# Patient Record
Sex: FEMALE | Race: OTHER | NOT HISPANIC OR LATINO | URBAN - METROPOLITAN AREA
[De-identification: names, ages, dates, MRNs, and addresses within clinical notes are randomized per-mention and may not be internally consistent; named-entity substitution may affect disease eponyms.]

---

## 2020-04-08 ENCOUNTER — EMERGENCY (EMERGENCY)
Facility: HOSPITAL | Age: 20
LOS: 1 days | End: 2020-04-08
Admitting: EMERGENCY MEDICINE
Payer: MEDICAID

## 2020-04-08 PROCEDURE — 74177 CT ABD & PELVIS W/CONTRAST: CPT | Mod: 26

## 2020-04-08 PROCEDURE — 99285 EMERGENCY DEPT VISIT HI MDM: CPT

## 2020-07-16 ENCOUNTER — APPOINTMENT (OUTPATIENT)
Dept: OBGYN | Facility: CLINIC | Age: 20
End: 2020-07-16
Payer: MEDICAID

## 2020-07-16 VITALS
WEIGHT: 135.31 LBS | BODY MASS INDEX: 22.55 KG/M2 | HEIGHT: 65 IN | DIASTOLIC BLOOD PRESSURE: 25 MMHG | SYSTOLIC BLOOD PRESSURE: 110 MMHG

## 2020-07-16 DIAGNOSIS — Z86.39 PERSONAL HISTORY OF OTHER ENDOCRINE, NUTRITIONAL AND METABOLIC DISEASE: ICD-10-CM

## 2020-07-16 DIAGNOSIS — Z87.442 PERSONAL HISTORY OF URINARY CALCULI: ICD-10-CM

## 2020-07-16 DIAGNOSIS — F50.00 ANOREXIA NERVOSA, UNSPECIFIED: ICD-10-CM

## 2020-07-16 DIAGNOSIS — Z87.42 PERSONAL HISTORY OF OTHER DISEASES OF THE FEMALE GENITAL TRACT: ICD-10-CM

## 2020-07-16 DIAGNOSIS — Z01.419 ENCOUNTER FOR GYNECOLOGICAL EXAMINATION (GENERAL) (ROUTINE) W/OUT ABNORMAL FINDINGS: ICD-10-CM

## 2020-07-16 DIAGNOSIS — Z80.49 FAMILY HISTORY OF MALIGNANT NEOPLASM OF OTHER GENITAL ORGANS: ICD-10-CM

## 2020-07-16 PROCEDURE — 99385 PREV VISIT NEW AGE 18-39: CPT

## 2020-07-16 NOTE — HISTORY OF PRESENT ILLNESS
[Menarche Age: ____] : age at menarche was [unfilled] [Sexually Active] : is sexually active [Contraception] : uses contraception [Spontaneous/Secondary] : is secondary/spontaneous [Previous Amenorrhea] : previous amenorrhea [PCOD] : polycystic ovarian syndrome [Condoms] : uses condoms [Regular Cycle Intervals] : periods have been irregular

## 2020-07-16 NOTE — PHYSICAL EXAM
[Awake] : awake [Alert] : alert [Oriented x3] : oriented to person, place, and time [Soft] : soft [Normal] : uterus [No Bleeding] : there was no active vaginal bleeding [Uterine Adnexae] : were not tender and not enlarged [Mass] : no breast mass [Acute Distress] : no acute distress [Nipple Discharge] : no nipple discharge [Axillary LAD] : no axillary lymphadenopathy [Tender] : non tender

## 2020-07-17 LAB
C TRACH RRNA SPEC QL NAA+PROBE: NOT DETECTED
N GONORRHOEA RRNA SPEC QL NAA+PROBE: NOT DETECTED
SOURCE AMPLIFICATION: NORMAL

## 2020-07-21 LAB — CYTOLOGY CVX/VAG DOC THIN PREP: NORMAL

## 2020-07-24 ENCOUNTER — APPOINTMENT (OUTPATIENT)
Dept: ULTRASOUND IMAGING | Facility: CLINIC | Age: 20
End: 2020-07-24

## 2020-08-06 LAB
DHEA-S SERPL-MCNC: 310 UG/DL
FSH SERPL-MCNC: 6.9 IU/L
GLUCOSE 1H P 100 G GLC PO SERPL-MCNC: 56 MG/DL
GLUCOSE 2H P 100 G GLC PO SERPL-MCNC: 71 MG/DL
GLUCOSE BS SERPL-MCNC: 87 MG/DL
HCG-TM SERPL-MCNC: <1 MIU/ML
LH SERPL-ACNC: 33.6 IU/L
PROLACTIN SERPL-MCNC: 24.8 NG/ML
TSH SERPL-ACNC: 1.29 UIU/ML

## 2020-08-07 ENCOUNTER — APPOINTMENT (OUTPATIENT)
Dept: OBGYN | Facility: CLINIC | Age: 20
End: 2020-08-07
Payer: MEDICAID

## 2020-08-07 VITALS
DIASTOLIC BLOOD PRESSURE: 60 MMHG | HEIGHT: 65 IN | WEIGHT: 135 LBS | SYSTOLIC BLOOD PRESSURE: 100 MMHG | BODY MASS INDEX: 22.49 KG/M2

## 2020-08-07 DIAGNOSIS — Z30.49 ENCOUNTER FOR SURVEILLANCE OF OTHER CONTRACEPTIVES: ICD-10-CM

## 2020-08-07 DIAGNOSIS — N91.2 AMENORRHEA, UNSPECIFIED: ICD-10-CM

## 2020-08-07 PROCEDURE — 99213 OFFICE O/P EST LOW 20 MIN: CPT

## 2020-08-07 NOTE — CHIEF COMPLAINT
[FreeTextEntry1] : The patient presents for results. She requests nuvaring.\par  [Follow Up] : follow up GYN visit

## 2020-08-11 LAB
17OHP SERPL-MCNC: 101 NG/DL
INSULIN P FAST SERPL-ACNC: 5.4 UU/ML
PROLACTIN SERPL-MCNC: 26.6 NG/ML
TESTOST BND SERPL-MCNC: 3.3 PG/ML
TESTOST SERPL-MCNC: 65 NG/DL

## 2020-08-12 ENCOUNTER — OUTPATIENT (OUTPATIENT)
Dept: OUTPATIENT SERVICES | Facility: HOSPITAL | Age: 20
LOS: 1 days | End: 2020-08-12

## 2020-08-12 ENCOUNTER — APPOINTMENT (OUTPATIENT)
Dept: ULTRASOUND IMAGING | Facility: CLINIC | Age: 20
End: 2020-08-12
Payer: MEDICAID

## 2020-08-12 DIAGNOSIS — N91.2 AMENORRHEA, UNSPECIFIED: ICD-10-CM

## 2020-08-12 PROCEDURE — 76830 TRANSVAGINAL US NON-OB: CPT | Mod: 26

## 2020-08-12 PROCEDURE — 76856 US EXAM PELVIC COMPLETE: CPT | Mod: 26

## 2020-09-09 ENCOUNTER — APPOINTMENT (OUTPATIENT)
Dept: ORTHOPEDIC SURGERY | Facility: CLINIC | Age: 20
End: 2020-09-09
Payer: MEDICAID

## 2020-09-09 VITALS — TEMPERATURE: 97.5 F

## 2020-09-09 VITALS
HEIGHT: 65 IN | SYSTOLIC BLOOD PRESSURE: 125 MMHG | WEIGHT: 135 LBS | DIASTOLIC BLOOD PRESSURE: 91 MMHG | BODY MASS INDEX: 22.49 KG/M2 | HEART RATE: 85 BPM

## 2020-09-09 PROCEDURE — 99204 OFFICE O/P NEW MOD 45 MIN: CPT

## 2020-09-09 PROCEDURE — 73110 X-RAY EXAM OF WRIST: CPT | Mod: LT

## 2020-12-10 ENCOUNTER — NON-APPOINTMENT (OUTPATIENT)
Age: 20
End: 2020-12-10

## 2020-12-23 PROBLEM — Z01.419 ENCOUNTER FOR GYNECOLOGICAL EXAMINATION: Status: RESOLVED | Noted: 2020-07-16 | Resolved: 2020-12-23

## 2021-03-04 ENCOUNTER — APPOINTMENT (OUTPATIENT)
Dept: OBGYN | Facility: CLINIC | Age: 21
End: 2021-03-04
Payer: MEDICAID

## 2021-03-04 VITALS — DIASTOLIC BLOOD PRESSURE: 82 MMHG | WEIGHT: 158.13 LBS | SYSTOLIC BLOOD PRESSURE: 108 MMHG

## 2021-03-04 DIAGNOSIS — Z87.09 PERSONAL HISTORY OF OTHER DISEASES OF THE RESPIRATORY SYSTEM: ICD-10-CM

## 2021-03-04 DIAGNOSIS — Z86.018 PERSONAL HISTORY OF OTHER BENIGN NEOPLASM: ICD-10-CM

## 2021-03-04 PROCEDURE — 99072 ADDL SUPL MATRL&STAF TM PHE: CPT

## 2021-03-04 PROCEDURE — 99213 OFFICE O/P EST LOW 20 MIN: CPT

## 2021-03-04 RX ORDER — DEXTROAMPHETAMINE SACCHARATE, AMPHETAMINE ASPARTATE, DEXTROAMPHETAMINE SULFATE, AND AMPHETAMINE SULFATE 5; 5; 5; 5 MG/1; MG/1; MG/1; MG/1
20 TABLET ORAL
Refills: 0 | Status: DISCONTINUED | COMMUNITY
End: 2021-03-04

## 2021-03-04 RX ORDER — BLOOD-GLUCOSE METER
W/DEVICE EACH MISCELLANEOUS
Qty: 1 | Refills: 0 | Status: ACTIVE | COMMUNITY
Start: 2020-11-30

## 2021-03-04 RX ORDER — MEDROXYPROGESTERONE ACETATE 10 MG/1
10 TABLET ORAL
Qty: 10 | Refills: 0 | Status: DISCONTINUED | COMMUNITY
Start: 2020-08-07 | End: 2021-03-04

## 2021-03-04 RX ORDER — BLOOD SUGAR DIAGNOSTIC
STRIP MISCELLANEOUS
Qty: 200 | Refills: 0 | Status: ACTIVE | COMMUNITY
Start: 2020-11-30

## 2021-03-04 RX ORDER — GLUCAGON 1 MG
1 KIT INJECTION
Qty: 2 | Refills: 0 | Status: ACTIVE | COMMUNITY
Start: 2020-11-30

## 2021-03-04 RX ORDER — LANCETS 33 GAUGE
EACH MISCELLANEOUS
Qty: 200 | Refills: 0 | Status: ACTIVE | COMMUNITY
Start: 2020-11-30

## 2021-03-04 NOTE — HISTORY OF PRESENT ILLNESS
[Intravaginal Ring] : uses the intravaginal ring [Y] : Patient is sexually active [N] : Patient denies prior pregnancies [Menarche Age: ____] : age at menarche was [unfilled] [PapSmeardate] : 07/2020 [PGHxTotal] : 0 [PGHxFullTerm] : 0 [PGHxPremature] : 0 [PGHxAbortions] : 0 [[facility]] : 0 [PGHxABInduced] : 0 [PGHxABSpont] : 0 [PGHxEctopic] : 0 [PGHxMultBirths] : 0

## 2021-03-04 NOTE — REASON FOR VISIT
[Follow-Up] : a follow-up evaluation of [FreeTextEntry2] : contraception. The patient presents for a refill of nuvaring.

## 2021-07-20 ENCOUNTER — APPOINTMENT (OUTPATIENT)
Dept: OBGYN | Facility: CLINIC | Age: 21
End: 2021-07-20
Payer: MEDICAID

## 2021-07-20 VITALS
BODY MASS INDEX: 26.66 KG/M2 | WEIGHT: 160 LBS | DIASTOLIC BLOOD PRESSURE: 70 MMHG | HEIGHT: 65 IN | SYSTOLIC BLOOD PRESSURE: 106 MMHG

## 2021-07-20 DIAGNOSIS — Z01.419 ENCOUNTER FOR GYNECOLOGICAL EXAMINATION (GENERAL) (ROUTINE) W/OUT ABNORMAL FINDINGS: ICD-10-CM

## 2021-07-20 DIAGNOSIS — Z86.39 PERSONAL HISTORY OF OTHER ENDOCRINE, NUTRITIONAL AND METABOLIC DISEASE: ICD-10-CM

## 2021-07-20 DIAGNOSIS — Z30.49 ENCOUNTER FOR SURVEILLANCE OF OTHER CONTRACEPTIVES: ICD-10-CM

## 2021-07-20 LAB
HCG UR QL: NEGATIVE
QUALITY CONTROL: YES

## 2021-07-20 PROCEDURE — 99072 ADDL SUPL MATRL&STAF TM PHE: CPT

## 2021-07-20 PROCEDURE — 81025 URINE PREGNANCY TEST: CPT

## 2021-07-20 PROCEDURE — 99395 PREV VISIT EST AGE 18-39: CPT

## 2021-07-20 RX ORDER — ETONOGESTREL AND ETHINYL ESTRADIOL 11.7; 2.7 MG/1; MG/1
0.12-0.015 INSERT, EXTENDED RELEASE VAGINAL
Qty: 3 | Refills: 0 | Status: DISCONTINUED | COMMUNITY
Start: 2020-08-07 | End: 2021-07-20

## 2021-07-20 NOTE — HISTORY OF PRESENT ILLNESS
[Intravaginal Ring] : uses the intravaginal ring [Y] : Patient is sexually active [Menarche Age: ____] : age at menarche was [unfilled] [PGHxTotal] : 0 [PGHxFullTerm] : 0 [PGHxPremature] : 0 [PGHxAbortions] : 0 [[facility]] : 0 [PGHxABInduced] : 0 [PGHxABSpont] : 0 [PGHxEctopic] : 0 [PGHxMultBirths] : 0

## 2021-07-21 LAB
C TRACH RRNA SPEC QL NAA+PROBE: NOT DETECTED
N GONORRHOEA RRNA SPEC QL NAA+PROBE: NOT DETECTED
SOURCE TP AMPLIFICATION: NORMAL

## 2021-07-26 LAB — CYTOLOGY CVX/VAG DOC THIN PREP: ABNORMAL

## 2021-11-13 ENCOUNTER — EMERGENCY (EMERGENCY)
Facility: HOSPITAL | Age: 21
LOS: 1 days | End: 2021-11-13
Admitting: EMERGENCY MEDICINE
Payer: MEDICAID

## 2021-11-13 PROCEDURE — 99053 MED SERV 10PM-8AM 24 HR FAC: CPT

## 2021-11-13 PROCEDURE — 99285 EMERGENCY DEPT VISIT HI MDM: CPT

## 2021-11-14 ENCOUNTER — EMERGENCY (EMERGENCY)
Facility: HOSPITAL | Age: 21
LOS: 1 days | End: 2021-11-14
Admitting: EMERGENCY MEDICINE
Payer: MEDICAID

## 2021-11-14 PROCEDURE — 74177 CT ABD & PELVIS W/CONTRAST: CPT | Mod: 26

## 2021-11-14 PROCEDURE — 93010 ELECTROCARDIOGRAM REPORT: CPT

## 2021-11-14 PROCEDURE — 99285 EMERGENCY DEPT VISIT HI MDM: CPT

## 2023-06-19 ENCOUNTER — APPOINTMENT (OUTPATIENT)
Dept: FAMILY MEDICINE | Facility: CLINIC | Age: 23
End: 2023-06-19

## 2023-07-25 ENCOUNTER — TRANSCRIPTION ENCOUNTER (OUTPATIENT)
Age: 23
End: 2023-07-25

## 2023-07-27 DIAGNOSIS — Z13.0 ENCOUNTER FOR SCREENING FOR DISEASES OF THE BLOOD AND BLOOD-FORMING ORGANS AND CERTAIN DISORDERS INVOLVING THE IMMUNE MECHANISM: ICD-10-CM

## 2023-07-27 DIAGNOSIS — Z13.220 ENCOUNTER FOR SCREENING FOR LIPOID DISORDERS: ICD-10-CM

## 2023-07-27 DIAGNOSIS — Z13.29 ENCOUNTER FOR SCREENING FOR OTHER SUSPECTED ENDOCRINE DISORDER: ICD-10-CM

## 2023-07-31 ENCOUNTER — APPOINTMENT (OUTPATIENT)
Dept: FAMILY MEDICINE | Facility: CLINIC | Age: 23
End: 2023-07-31
Payer: COMMERCIAL

## 2023-07-31 VITALS
HEART RATE: 96 BPM | SYSTOLIC BLOOD PRESSURE: 110 MMHG | OXYGEN SATURATION: 97 % | WEIGHT: 129 LBS | RESPIRATION RATE: 14 BRPM | TEMPERATURE: 98.2 F | BODY MASS INDEX: 21.49 KG/M2 | HEIGHT: 65 IN | DIASTOLIC BLOOD PRESSURE: 80 MMHG

## 2023-07-31 DIAGNOSIS — Z76.89 PERSONS ENCOUNTERING HEALTH SERVICES IN OTHER SPECIFIED CIRCUMSTANCES: ICD-10-CM

## 2023-07-31 DIAGNOSIS — M25.532 PAIN IN LEFT WRIST: ICD-10-CM

## 2023-07-31 PROCEDURE — 99204 OFFICE O/P NEW MOD 45 MIN: CPT

## 2023-07-31 RX ORDER — INSULIN GLARGINE 100 [IU]/ML
INJECTION, SOLUTION SUBCUTANEOUS
Refills: 0 | Status: DISCONTINUED | COMMUNITY
End: 2023-07-31

## 2023-07-31 RX ORDER — GLUCAGON 3 MG/1
3 POWDER NASAL
Qty: 2 | Refills: 0 | Status: ACTIVE | COMMUNITY
Start: 2023-03-21

## 2023-07-31 RX ORDER — ARIPIPRAZOLE 2 MG/1
2 TABLET ORAL
Qty: 21 | Refills: 0 | Status: DISCONTINUED | COMMUNITY
Start: 2020-10-20 | End: 2023-07-31

## 2023-07-31 RX ORDER — DEXTROAMPHETAMINE SACCHARATE, AMPHETAMINE ASPARTATE MONOHYDRATE, DEXTROAMPHETAMINE SULFATE AND AMPHETAMINE SULFATE 5; 5; 5; 5 MG/1; MG/1; MG/1; MG/1
20 CAPSULE, EXTENDED RELEASE ORAL
Qty: 30 | Refills: 0 | Status: DISCONTINUED | COMMUNITY
Start: 2020-09-17 | End: 2023-07-31

## 2023-07-31 RX ORDER — INSULIN GLULISINE 100 [IU]/ML
100 INJECTION, SOLUTION SUBCUTANEOUS
Qty: 15 | Refills: 0 | Status: COMPLETED | COMMUNITY
Start: 2023-05-24

## 2023-07-31 RX ORDER — ETONOGESTREL AND ETHINYL ESTRADIOL 11.7; 2.7 MG/1; MG/1
0.12-0.015 INSERT, EXTENDED RELEASE VAGINAL
Qty: 3 | Refills: 1 | Status: DISCONTINUED | COMMUNITY
Start: 2021-03-04 | End: 2023-07-31

## 2023-07-31 RX ORDER — OXYCODONE AND ACETAMINOPHEN 5; 325 MG/1; MG/1
5-325 TABLET ORAL
Qty: 10 | Refills: 0 | Status: COMPLETED | COMMUNITY
Start: 2023-07-23

## 2023-07-31 RX ORDER — ALBUTEROL SULFATE 90 UG/1
108 (90 BASE) INHALANT RESPIRATORY (INHALATION)
Qty: 18 | Refills: 0 | Status: COMPLETED | COMMUNITY
Start: 2020-11-30 | End: 2023-07-31

## 2023-07-31 RX ORDER — ETONOGESTREL AND ETHINYL ESTRADIOL 11.7; 2.7 MG/1; MG/1
0.12-0.015 INSERT, EXTENDED RELEASE VAGINAL
Qty: 3 | Refills: 1 | Status: DISCONTINUED | COMMUNITY
Start: 2021-07-20 | End: 2023-07-31

## 2023-07-31 RX ORDER — INSULIN LISPRO 100 U/ML
100 INJECTION, SOLUTION INTRAVENOUS; SUBCUTANEOUS
Qty: 30 | Refills: 0 | Status: ACTIVE | COMMUNITY
Start: 2023-02-10

## 2023-07-31 RX ORDER — LORAZEPAM 1 MG/1
1 TABLET ORAL
Qty: 30 | Refills: 0 | Status: COMPLETED | COMMUNITY
Start: 2020-10-29 | End: 2023-07-31

## 2023-07-31 RX ORDER — SERTRALINE HYDROCHLORIDE 100 MG/1
100 TABLET, FILM COATED ORAL
Qty: 14 | Refills: 0 | Status: COMPLETED | COMMUNITY
Start: 2020-11-30 | End: 2023-07-31

## 2023-07-31 RX ORDER — INSULIN GLARGINE 100 [IU]/ML
100 INJECTION, SOLUTION SUBCUTANEOUS
Qty: 15 | Refills: 0 | Status: ACTIVE | COMMUNITY
Start: 2023-02-15

## 2023-07-31 NOTE — HISTORY OF PRESENT ILLNESS
[FreeTextEntry8] : Patient is present. Patient is here to establish care. Patient a type 1 diabetics.   Born healthy at [facility]. At 5 dx w ADD ODD,  She had behavioral issues and hormonal issues , low blood sugar . she was hospitalized 20 x for low blood sugar.  Eventually she was diagnosed with a neuroendocrine tumor. She had a whipple procedure. She is on creon and insulin pump. She suffered severe malnutrition  lost 100 pounds .lost her hair, she is just building back her life, gaining back some weight and getting stronger . She took a year off to work . she is going to [employer] for [employer]. 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  She lives with her fiancee in [facility]. her mom has hx of allergies. her dad is not in the picture. she has an support dog for her type 3c diabetes. the dog is 8 yo her name is [email or web address].  she is famous for her service. she has the ability to get my glucagon and a drink out of the fridge and she carries my juice and pen for me. I am so thankful for her.  she needs renewal of adderall and creon.

## 2023-07-31 NOTE — PHYSICAL EXAM
[No Acute Distress] : no acute distress [Well Nourished] : well nourished [Well Developed] : well developed [Well-Appearing] : well-appearing [Normal Sclera/Conjunctiva] : normal sclera/conjunctiva [PERRL] : pupils equal round and reactive to light [EOMI] : extraocular movements intact [Normal Outer Ear/Nose] : the outer ears and nose were normal in appearance [Normal Oropharynx] : the oropharynx was normal [No JVD] : no jugular venous distention [No Lymphadenopathy] : no lymphadenopathy [Supple] : supple [Thyroid Normal, No Nodules] : the thyroid was normal and there were no nodules present [No Respiratory Distress] : no respiratory distress  [No Accessory Muscle Use] : no accessory muscle use [Clear to Auscultation] : lungs were clear to auscultation bilaterally [Normal Rate] : normal rate  [Regular Rhythm] : with a regular rhythm [Normal S1, S2] : normal S1 and S2 [No Murmur] : no murmur heard [No Carotid Bruits] : no carotid bruits [No Abdominal Bruit] : a ~M bruit was not heard ~T in the abdomen [No Varicosities] : no varicosities [Pedal Pulses Present] : the pedal pulses are present [No Edema] : there was no peripheral edema [No Palpable Aorta] : no palpable aorta [No Extremity Clubbing/Cyanosis] : no extremity clubbing/cyanosis [Non-distended] : non-distended [No Masses] : no abdominal mass palpated [No HSM] : no HSM [Normal Bowel Sounds] : normal bowel sounds [Normal Posterior Cervical Nodes] : no posterior cervical lymphadenopathy [Normal Anterior Cervical Nodes] : no anterior cervical lymphadenopathy [No CVA Tenderness] : no CVA  tenderness [No Spinal Tenderness] : no spinal tenderness [No Joint Swelling] : no joint swelling [Grossly Normal Strength/Tone] : grossly normal strength/tone [No Rash] : no rash [Coordination Grossly Intact] : coordination grossly intact [No Focal Deficits] : no focal deficits [Normal Gait] : normal gait [Deep Tendon Reflexes (DTR)] : deep tendon reflexes were 2+ and symmetric [Normal Affect] : the affect was normal [Normal Insight/Judgement] : insight and judgment were intact [de-identified] : multiple scars.  on abdomen

## 2023-07-31 NOTE — REVIEW OF SYSTEMS
[Negative] : Heme/Lymph [Recent Change In Weight] : ~T recent weight change [Constipation] : constipation [Diarrhea] : diarrhea

## 2023-07-31 NOTE — CURRENT MEDS
[Takes medication as prescribed] : takes [None] : Patient does not have any barriers to medication adherence [Yes] : Reviewed medication list for presence of high-risk medications. [Other____] : [unfilled] No

## 2023-08-01 ENCOUNTER — TRANSCRIPTION ENCOUNTER (OUTPATIENT)
Age: 23
End: 2023-08-01

## 2023-09-11 ENCOUNTER — APPOINTMENT (OUTPATIENT)
Dept: FAMILY MEDICINE | Facility: CLINIC | Age: 23
End: 2023-09-11
Payer: COMMERCIAL

## 2023-09-11 VITALS
OXYGEN SATURATION: 96 % | WEIGHT: 129 LBS | HEIGHT: 65 IN | RESPIRATION RATE: 14 BRPM | BODY MASS INDEX: 21.49 KG/M2 | TEMPERATURE: 98.5 F | SYSTOLIC BLOOD PRESSURE: 108 MMHG | HEART RATE: 86 BPM | DIASTOLIC BLOOD PRESSURE: 76 MMHG

## 2023-09-11 DIAGNOSIS — F32.A ANXIETY DISORDER, UNSPECIFIED: ICD-10-CM

## 2023-09-11 DIAGNOSIS — F90.9 ATTENTION-DEFICIT HYPERACTIVITY DISORDER, UNSPECIFIED TYPE: ICD-10-CM

## 2023-09-11 DIAGNOSIS — F41.9 ANXIETY DISORDER, UNSPECIFIED: ICD-10-CM

## 2023-09-11 PROCEDURE — 99214 OFFICE O/P EST MOD 30 MIN: CPT

## 2023-09-11 RX ORDER — INSULIN PMP CART,AUT,G6/7,CNTR
EACH SUBCUTANEOUS
Qty: 1 | Refills: 0 | Status: ACTIVE | COMMUNITY
Start: 2023-08-01

## 2023-09-11 RX ORDER — PEN NEEDLE, DIABETIC 29 G X1/2"
32G X 4 MM NEEDLE, DISPOSABLE MISCELLANEOUS
Qty: 200 | Refills: 0 | Status: ACTIVE | COMMUNITY
Start: 2023-02-16

## 2023-09-11 RX ORDER — INSULIN PMP CART,AUT,G6/7,CNTR
EACH SUBCUTANEOUS
Qty: 10 | Refills: 0 | Status: ACTIVE | COMMUNITY
Start: 2023-08-01

## 2023-09-11 RX ORDER — INSULIN LISPRO 100 [IU]/ML
100 INJECTION, SOLUTION INTRAVENOUS; SUBCUTANEOUS
Qty: 45 | Refills: 0 | Status: ACTIVE | COMMUNITY
Start: 2023-08-01

## 2023-10-11 ENCOUNTER — TRANSCRIPTION ENCOUNTER (OUTPATIENT)
Age: 23
End: 2023-10-11

## 2023-10-16 ENCOUNTER — TRANSCRIPTION ENCOUNTER (OUTPATIENT)
Age: 23
End: 2023-10-16

## 2023-10-30 ENCOUNTER — TRANSCRIPTION ENCOUNTER (OUTPATIENT)
Age: 23
End: 2023-10-30

## 2023-11-11 ENCOUNTER — NON-APPOINTMENT (OUTPATIENT)
Age: 23
End: 2023-11-11

## 2023-11-14 ENCOUNTER — APPOINTMENT (OUTPATIENT)
Dept: FAMILY MEDICINE | Facility: CLINIC | Age: 23
End: 2023-11-14
Payer: COMMERCIAL

## 2023-11-14 ENCOUNTER — APPOINTMENT (OUTPATIENT)
Dept: FAMILY MEDICINE | Facility: CLINIC | Age: 23
End: 2023-11-14

## 2023-11-14 VITALS
OXYGEN SATURATION: 98 % | RESPIRATION RATE: 14 BRPM | BODY MASS INDEX: 21.49 KG/M2 | SYSTOLIC BLOOD PRESSURE: 120 MMHG | WEIGHT: 129 LBS | HEIGHT: 65 IN | HEART RATE: 80 BPM | DIASTOLIC BLOOD PRESSURE: 78 MMHG | TEMPERATURE: 98.7 F

## 2023-11-14 DIAGNOSIS — D64.9 ANEMIA, UNSPECIFIED: ICD-10-CM

## 2023-11-14 DIAGNOSIS — Z00.00 ENCOUNTER FOR GENERAL ADULT MEDICAL EXAMINATION W/OUT ABNORMAL FINDINGS: ICD-10-CM

## 2023-11-14 PROCEDURE — 99214 OFFICE O/P EST MOD 30 MIN: CPT

## 2023-11-14 RX ORDER — DEXTROAMPHETAMINE SACCHARATE, AMPHETAMINE ASPARTATE, DEXTROAMPHETAMINE SULFATE AND AMPHETAMINE SULFATE 5; 5; 5; 5 MG/1; MG/1; MG/1; MG/1
20 TABLET ORAL TWICE DAILY
Qty: 60 | Refills: 0 | Status: ACTIVE | COMMUNITY
Start: 1900-01-01 | End: 1900-01-01

## 2023-11-17 DIAGNOSIS — N92.6 IRREGULAR MENSTRUATION, UNSPECIFIED: ICD-10-CM

## 2023-11-17 LAB
ALBUMIN SERPL ELPH-MCNC: 5.1 G/DL
ALP BLD-CCNC: 91 U/L
ALT SERPL-CCNC: 13 U/L
ANION GAP SERPL CALC-SCNC: 12 MMOL/L
AST SERPL-CCNC: 19 U/L
BASOPHILS # BLD AUTO: 0.02 K/UL
BASOPHILS NFR BLD AUTO: 0.3 %
BILIRUB SERPL-MCNC: 1 MG/DL
BUN SERPL-MCNC: 12 MG/DL
CALCIUM SERPL-MCNC: 9.7 MG/DL
CHLORIDE SERPL-SCNC: 95 MMOL/L
CO2 SERPL-SCNC: 25 MMOL/L
CREAT SERPL-MCNC: 0.54 MG/DL
EGFR: 132 ML/MIN/1.73M2
EOSINOPHIL # BLD AUTO: 0.03 K/UL
EOSINOPHIL NFR BLD AUTO: 0.5 %
FERRITIN SERPL-MCNC: 40 NG/ML
FOLATE SERPL-MCNC: 19.9 NG/ML
GLUCOSE SERPL-MCNC: 236 MG/DL
HCT VFR BLD CALC: 35.7 %
HGB BLD-MCNC: 11.7 G/DL
IMM GRANULOCYTES NFR BLD AUTO: 0.2 %
IRON SATN MFR SERPL: 18 %
IRON SERPL-MCNC: 76 UG/DL
LYMPHOCYTES # BLD AUTO: 1.57 K/UL
LYMPHOCYTES NFR BLD AUTO: 25.7 %
MAN DIFF?: NORMAL
MCHC RBC-ENTMCNC: 31.3 PG
MCHC RBC-ENTMCNC: 32.8 GM/DL
MCV RBC AUTO: 95.5 FL
MONOCYTES # BLD AUTO: 0.55 K/UL
MONOCYTES NFR BLD AUTO: 9 %
NEUTROPHILS # BLD AUTO: 3.94 K/UL
NEUTROPHILS NFR BLD AUTO: 64.3 %
PLATELET # BLD AUTO: 317 K/UL
POTASSIUM SERPL-SCNC: 4.1 MMOL/L
PROT SERPL-MCNC: 7.6 G/DL
RBC # BLD: 3.74 M/UL
RBC # FLD: 11.6 %
SODIUM SERPL-SCNC: 131 MMOL/L
TIBC SERPL-MCNC: 420 UG/DL
UIBC SERPL-MCNC: 343 UG/DL
VIT B12 SERPL-MCNC: 1063 PG/ML
WBC # FLD AUTO: 6.12 K/UL

## 2023-11-20 LAB — HCG SERPL-MCNC: ABNORMAL MIU/ML

## 2023-12-06 ENCOUNTER — EMERGENCY (EMERGENCY)
Facility: HOSPITAL | Age: 23
LOS: 1 days | Discharge: HOME | End: 2023-12-06
Attending: EMERGENCY MEDICINE | Admitting: EMERGENCY MEDICINE
Payer: COMMERCIAL

## 2023-12-06 VITALS
OXYGEN SATURATION: 98 % | DIASTOLIC BLOOD PRESSURE: 61 MMHG | HEART RATE: 86 BPM | SYSTOLIC BLOOD PRESSURE: 107 MMHG | RESPIRATION RATE: 18 BRPM

## 2023-12-06 VITALS
SYSTOLIC BLOOD PRESSURE: 114 MMHG | DIASTOLIC BLOOD PRESSURE: 72 MMHG | OXYGEN SATURATION: 98 % | RESPIRATION RATE: 15 BRPM | TEMPERATURE: 98 F | HEIGHT: 66 IN | HEART RATE: 104 BPM | WEIGHT: 130.07 LBS

## 2023-12-06 DIAGNOSIS — E10.9 TYPE 1 DIABETES MELLITUS WITHOUT COMPLICATIONS: ICD-10-CM

## 2023-12-06 LAB
ALBUMIN SERPL ELPH-MCNC: 4 G/DL — SIGNIFICANT CHANGE UP (ref 3.3–5)
ALP SERPL-CCNC: 56 U/L — SIGNIFICANT CHANGE UP (ref 30–120)
ALT FLD-CCNC: 31 U/L — SIGNIFICANT CHANGE UP (ref 10–60)
ANION GAP SERPL CALC-SCNC: 7 MMOL/L — SIGNIFICANT CHANGE UP (ref 5–17)
APTT BLD: 31.5 SEC — SIGNIFICANT CHANGE UP (ref 24.5–35.6)
AST SERPL-CCNC: 27 U/L — SIGNIFICANT CHANGE UP (ref 10–40)
BASOPHILS # BLD AUTO: 0.02 K/UL — SIGNIFICANT CHANGE UP (ref 0–0.2)
BASOPHILS NFR BLD AUTO: 0.2 % — SIGNIFICANT CHANGE UP (ref 0–2)
BILIRUB SERPL-MCNC: 0.4 MG/DL — SIGNIFICANT CHANGE UP (ref 0.2–1.2)
BUN SERPL-MCNC: 13 MG/DL — SIGNIFICANT CHANGE UP (ref 7–23)
CALCIUM SERPL-MCNC: 9.9 MG/DL — SIGNIFICANT CHANGE UP (ref 8.4–10.5)
CHLORIDE SERPL-SCNC: 95 MMOL/L — LOW (ref 96–108)
CO2 SERPL-SCNC: 28 MMOL/L — SIGNIFICANT CHANGE UP (ref 22–31)
CREAT SERPL-MCNC: 0.59 MG/DL — SIGNIFICANT CHANGE UP (ref 0.5–1.3)
EGFR: 129 ML/MIN/1.73M2 — SIGNIFICANT CHANGE UP
EOSINOPHIL # BLD AUTO: 0.09 K/UL — SIGNIFICANT CHANGE UP (ref 0–0.5)
EOSINOPHIL NFR BLD AUTO: 1 % — SIGNIFICANT CHANGE UP (ref 0–6)
GLUCOSE BLDC GLUCOMTR-MCNC: 144 MG/DL — HIGH (ref 70–99)
GLUCOSE BLDC GLUCOMTR-MCNC: 195 MG/DL — HIGH (ref 70–99)
GLUCOSE BLDC GLUCOMTR-MCNC: 254 MG/DL — HIGH (ref 70–99)
GLUCOSE BLDC GLUCOMTR-MCNC: 97 MG/DL — SIGNIFICANT CHANGE UP (ref 70–99)
GLUCOSE SERPL-MCNC: 39 MG/DL — CRITICAL LOW (ref 70–99)
HCG SERPL-ACNC: HIGH MIU/ML
HCT VFR BLD CALC: 34.3 % — LOW (ref 34.5–45)
HGB BLD-MCNC: 11.2 G/DL — LOW (ref 11.5–15.5)
IMM GRANULOCYTES NFR BLD AUTO: 0.2 % — SIGNIFICANT CHANGE UP (ref 0–0.9)
INR BLD: 1.02 RATIO — SIGNIFICANT CHANGE UP (ref 0.85–1.18)
LYMPHOCYTES # BLD AUTO: 2.61 K/UL — SIGNIFICANT CHANGE UP (ref 1–3.3)
LYMPHOCYTES # BLD AUTO: 29.1 % — SIGNIFICANT CHANGE UP (ref 13–44)
MCHC RBC-ENTMCNC: 31.2 PG — SIGNIFICANT CHANGE UP (ref 27–34)
MCHC RBC-ENTMCNC: 32.7 GM/DL — SIGNIFICANT CHANGE UP (ref 32–36)
MCV RBC AUTO: 95.5 FL — SIGNIFICANT CHANGE UP (ref 80–100)
MONOCYTES # BLD AUTO: 0.87 K/UL — SIGNIFICANT CHANGE UP (ref 0–0.9)
MONOCYTES NFR BLD AUTO: 9.7 % — SIGNIFICANT CHANGE UP (ref 2–14)
NEUTROPHILS # BLD AUTO: 5.37 K/UL — SIGNIFICANT CHANGE UP (ref 1.8–7.4)
NEUTROPHILS NFR BLD AUTO: 59.8 % — SIGNIFICANT CHANGE UP (ref 43–77)
NRBC # BLD: 0 /100 WBCS — SIGNIFICANT CHANGE UP (ref 0–0)
PLATELET # BLD AUTO: 311 K/UL — SIGNIFICANT CHANGE UP (ref 150–400)
POTASSIUM SERPL-MCNC: 3.3 MMOL/L — LOW (ref 3.5–5.3)
POTASSIUM SERPL-SCNC: 3.3 MMOL/L — LOW (ref 3.5–5.3)
PROT SERPL-MCNC: 7.6 G/DL — SIGNIFICANT CHANGE UP (ref 6–8.3)
PROTHROM AB SERPL-ACNC: 11.4 SEC — SIGNIFICANT CHANGE UP (ref 9.5–13)
RBC # BLD: 3.59 M/UL — LOW (ref 3.8–5.2)
RBC # FLD: 11.6 % — SIGNIFICANT CHANGE UP (ref 10.3–14.5)
SODIUM SERPL-SCNC: 130 MMOL/L — LOW (ref 135–145)
WBC # BLD: 8.98 K/UL — SIGNIFICANT CHANGE UP (ref 3.8–10.5)
WBC # FLD AUTO: 8.98 K/UL — SIGNIFICANT CHANGE UP (ref 3.8–10.5)

## 2023-12-06 PROCEDURE — 85025 COMPLETE CBC W/AUTO DIFF WBC: CPT

## 2023-12-06 PROCEDURE — 76817 TRANSVAGINAL US OBSTETRIC: CPT

## 2023-12-06 PROCEDURE — 96361 HYDRATE IV INFUSION ADD-ON: CPT

## 2023-12-06 PROCEDURE — 36415 COLL VENOUS BLD VENIPUNCTURE: CPT

## 2023-12-06 PROCEDURE — 99284 EMERGENCY DEPT VISIT MOD MDM: CPT | Mod: 25

## 2023-12-06 PROCEDURE — 99285 EMERGENCY DEPT VISIT HI MDM: CPT

## 2023-12-06 PROCEDURE — 82962 GLUCOSE BLOOD TEST: CPT

## 2023-12-06 PROCEDURE — 96375 TX/PRO/DX INJ NEW DRUG ADDON: CPT

## 2023-12-06 PROCEDURE — 96374 THER/PROPH/DIAG INJ IV PUSH: CPT

## 2023-12-06 PROCEDURE — 85610 PROTHROMBIN TIME: CPT

## 2023-12-06 PROCEDURE — 84702 CHORIONIC GONADOTROPIN TEST: CPT

## 2023-12-06 PROCEDURE — 85730 THROMBOPLASTIN TIME PARTIAL: CPT

## 2023-12-06 PROCEDURE — 76817 TRANSVAGINAL US OBSTETRIC: CPT | Mod: 26

## 2023-12-06 PROCEDURE — 96376 TX/PRO/DX INJ SAME DRUG ADON: CPT

## 2023-12-06 PROCEDURE — 80053 COMPREHEN METABOLIC PANEL: CPT

## 2023-12-06 RX ORDER — SODIUM CHLORIDE 9 MG/ML
1000 INJECTION, SOLUTION INTRAVENOUS
Refills: 0 | Status: DISCONTINUED | OUTPATIENT
Start: 2023-12-06 | End: 2023-12-10

## 2023-12-06 RX ORDER — ONDANSETRON 8 MG/1
1 TABLET, FILM COATED ORAL
Qty: 2 | Refills: 0
Start: 2023-12-06 | End: 2023-12-10

## 2023-12-06 RX ORDER — ONDANSETRON 8 MG/1
4 TABLET, FILM COATED ORAL ONCE
Refills: 0 | Status: COMPLETED | OUTPATIENT
Start: 2023-12-06 | End: 2023-12-06

## 2023-12-06 RX ORDER — DEXTROSE 50 % IN WATER 50 %
50 SYRINGE (ML) INTRAVENOUS ONCE
Refills: 0 | Status: COMPLETED | OUTPATIENT
Start: 2023-12-06 | End: 2023-12-06

## 2023-12-06 RX ADMIN — SODIUM CHLORIDE 50 MILLILITER(S): 9 INJECTION, SOLUTION INTRAVENOUS at 16:52

## 2023-12-06 RX ADMIN — SODIUM CHLORIDE 1000 MILLILITER(S): 9 INJECTION, SOLUTION INTRAVENOUS at 21:21

## 2023-12-06 RX ADMIN — Medication 50 MILLILITER(S): at 16:29

## 2023-12-06 RX ADMIN — ONDANSETRON 4 MILLIGRAM(S): 8 TABLET, FILM COATED ORAL at 16:54

## 2023-12-06 RX ADMIN — SODIUM CHLORIDE 50 MILLILITER(S): 9 INJECTION, SOLUTION INTRAVENOUS at 16:28

## 2023-12-06 RX ADMIN — ONDANSETRON 4 MILLIGRAM(S): 8 TABLET, FILM COATED ORAL at 19:40

## 2023-12-06 RX ADMIN — Medication 50 MILLILITER(S): at 16:52

## 2023-12-06 NOTE — ED ADULT NURSE REASSESSMENT NOTE - NS ED NURSE REASSESS COMMENT FT1
Diabetic DASH Daniel at bedside with pt for eval of pt
f/s 127, no new complaints or cosening of s/s, will continue to monitor pt and continue to recheck f/s q30 mins per MD orders
f/s 144, pt eating another meal, reports feeling better at this time, will continue to monitor pt
pt states she began feeling like her sugar was low, f/s 97 , gave pt meal & juice, will continue to monitor pt
Tests resulted, VSS, pt reevaluated by MD, D/C home with f/u instructions.

## 2023-12-06 NOTE — CONSULT NOTE ADULT - SUBJECTIVE AND OBJECTIVE BOX
Patient is a 23y old  Female who presents with a chief complaint of     Type 3c, s/p whipple total pancrectomy in 2020 r/t pancreatic tumor, unsure of current A1c. managed by [place] [facility], last seen 3 months ago. reports now 8 weeks pregnant. uses humalog in omnipod 5 with dexcom g6 CGM. reviewed 24 hour trend 50-80 w/ 2 spikes >250 prandial. pt reports N/V unable to keep food down. received 2 amps d50 + d5 @ 50ml/hr, will trial Zofran and give 15g carbs PO, treat until F/S >100mg/dL. pt has appt w/ high risk OB at [facility] this friday.  diabetes education provided- A1c measure and BG targets  fasting, <180 2 hours postmeal, s/s of hyperglycemia/hypoglycemia and management, glycemic control and preventing complications, consistent carb diet, balanced plate method, consistent meal planning. sick day management, provider f/u  Hx gravitas, N/V, type 3c    HPI:      PAST MEDICAL & SURGICAL HISTORY:    Allergies    penicillin (Hives)    Intolerances        MEDICATIONS  (STANDING):  dextrose 5%. 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 managed by [place] [facility], last seen 3 months ago. reports now 8 weeks pregnant. uses humalog in omnipod 5 with dexcom g6 CGM. reviewed 24 hour trend 50-80 w/ 2 spikes >250 prandial. pt reports N/V unable to keep food down. received 2 amps d50 + d5 @ 50ml/hr, will trial Zofran and give 15g carbs PO, treat until F/S >100mg/dL. pt has appt w/ high risk OB at [facility] this friday.  diabetes education provided- A1c measure and BG targets  fasting, <180 2 hours postmeal, s/s of hyperglycemia/hypoglycemia and management, glycemic control and preventing complications, consistent carb diet, balanced plate method, consistent meal planning. sick day management, provider f/u  Hx gravitas, N/V, type 3c    HPI:      PAST MEDICAL & SURGICAL HISTORY:    Allergies    penicillin (Hives)    Intolerances        MEDICATIONS  (STANDING):  dextrose 5%. 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 managed by [place] [facility], last seen 3 months ago. reports now 8 weeks pregnant. uses humalog in omnipod 5 with dexcom g6 CGM closed loop system. reviewed 24 hour trend 50-80 w/ 2 spikes >250 prandial. pt reports N/V unable to keep food down. received 2 amps d50 + d5 @ 50ml/hr, will trial Zofran and give 15g carbs PO, treat until F/S >100mg/dL. discussed with Dr. Craig Perlman, will review pump settings, increase activity time, recommend eating small meals 15-30g carbs every 3 hours to avoid N/V, doing carb counting and increaseing ISF 1:100 and ICR 1:35. pt has appt w/ high risk OB at [facility] this friday.   diabetes education provided- A1c measure and BG targets  fasting, <180 2 hours postmeal, s/s of hyperglycemia/hypoglycemia and management, glycemic control and preventing complications, consistent carb diet, balanced plate method, consistent meal planning. sick day management, provider f/u  Hx gravitas, N/V, type 3c    HPI:      PAST MEDICAL & SURGICAL HISTORY:    Allergies    penicillin (Hives)    Intolerances        MEDICATIONS  (STANDING):  dextrose 5%. 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 managed by [place] [facility], last seen 3 months ago. reports now 8 weeks pregnant. uses humalog in omnipod 5 with dexcom g6 CGM closed loop system. reviewed 24 hour trend 50-80 w/ 2 spikes >250 prandial. pt reports N/V unable to keep food down. received 2 amps d50 + d5 @ 50ml/hr, will trial Zofran and give 15g carbs PO, treat until F/S >100mg/dL. discussed with Dr. Craig Perlman, will review pump settings, increase activity time, recommend eating small meals 15-30g carbs every 3 hours to avoid N/V, doing carb counting and increaseing ISF 1:100 and ICR 1:35. pt has appt w/ high risk OB at [facility] this friday.   diabetes education provided- A1c measure and BG targets  fasting, <180 2 hours postmeal, s/s of hyperglycemia/hypoglycemia and management, glycemic control and preventing complications, consistent carb diet, balanced plate method, consistent meal planning. sick day management, provider f/u  Hx gravitas, N/V, type 3c    HPI:      PAST MEDICAL & SURGICAL HISTORY:    Allergies    penicillin (Hives)    Intolerances        MEDICATIONS  (STANDING):  dextrose 5%. 1000 milliLiter(s) (50 mL/Hr) IV Continuous <Continuous>  dextrose 50% Injectable 50 milliLiter(s) IV Push Once  ondansetron Injectable 4 milliGRAM(s) IV Push once

## 2023-12-06 NOTE — ED PROVIDER NOTE - PROGRESS NOTE DETAILS
pt feels ready to go home, spoke with the diabetes educator, will send rx for zofran, states she has an appt with her ob in 2d, copy of reports given

## 2023-12-06 NOTE — ED PROVIDER NOTE - CARE PLAN
1 Principal Discharge DX:	Hypoglycemia  Secondary Diagnosis:	Pregnancy  Secondary Diagnosis:	Hyperemesis

## 2023-12-06 NOTE — ED PROVIDER NOTE - CLINICAL SUMMARY MEDICAL DECISION MAKING FREE TEXT BOX
23-year-old female with type 1 diabetes insulin pump states she is about 8 weeks pregnant and has been throwing up all day today.  Noted blood sugars to be low on her monitor.  Unable to eat due to nausea.  Works as a EMS technician.  Denies fever headache cough shortness of breath diarrhea dysuria hematuria melena abdominal pain or other symptom.  Denies other meds besides insulin.  Her PCP is at [place].    Physical exam is normal.  Fingerstick was 38.  Impression is diabetic hypoglycemia likely related to hyperemesis of pregnancy.  Plan is D50 and D5W drip.  May need Zofran.  If cannot tolerate p.o. food and fluids may need hospitalization for further care. 23-year-old female with type 1 diabetes insulin pump states she is about 8 weeks pregnant and has been throwing up all day today.  Noted blood sugars to be low on her monitor.  Unable to eat due to nausea.  Works as a EMS technician.  Denies fever headache cough shortness of breath diarrhea dysuria hematuria melena abdominal pain or other symptom.  Denies other meds besides insulin.  Her PCP is at [place].    Physical exam is normal.  Fingerstick was 38.  Impression is diabetic hypoglycemia likely related to hyperemesis of pregnancy.  Plan is D50 and D5W drip.  May need Zofran.  If cannot tolerate p.o. food and fluids may need hospitalization for further care.

## 2023-12-06 NOTE — ED ADULT NURSE NOTE - OBJECTIVE STATEMENT
received pt in assigned area a&xo3 pt states for the past 2days c/o n/v & unable to tolerate food,  glucose readings have been low,  40-50 range,  pt recently had pos preg test & approx 8 wks pregnant.  Pt has a type 3 C Diabetic with an Insulin pump & censor. Pt reports no abd pain, cp or sob. F/S 38 upon arrival, pt is awake, IVL placed labs drawn and sent, given 1 amp of D50, repeat f/s 95 & 15 mins later f/s  59. Pt given additional D50 amp & Zofran for nausea. D5 infusing via pump per MD orders. pt currently drinking juice  & eating crackers , pt on cardiac monitor, skin intact, resps even and non la bored, safety maintained & bed in lowest position. will continue to monitor pt received pt in assigned area a&xo3 with service dog at bedside. pt states for the past 2days c/o n/v & unable to tolerate food,  glucose readings have been low,  40-50 range,  pt recently had pos preg test & approx 8 wks pregnant.  Pt has a type 3 C Diabetic with an Insulin pump & censor due to h/o malignant tumor on pancreas that was removed along with pancreas & part of intestine . Pt reports no abd pain, cp or sob. F/S 38 upon arrival, pt is awake, IVL placed labs drawn and sent, given 1 amp of D50, repeat f/s 95 & 15 mins later f/s  59. Pt given additional D50 amp & Zofran for nausea. D5 infusing via pump per MD orders. pt currently drinking juice  & eating crackers , pt on cardiac monitor, skin intact, resps even and non la bored, safety maintained & bed in lowest position. will continue to monitor pt

## 2023-12-06 NOTE — ED PROVIDER NOTE - OBJECTIVE STATEMENT
23-year-old female with type 1 diabetes insulin pump states she is about 8 weeks pregnant and has been throwing up all day today.  Noted blood sugars to be low on her monitor.  Unable to eat due to nausea.  Works as a EMS technician.  Denies fever headache cough shortness of breath diarrhea dysuria hematuria melena abdominal pain or other symptom.  Denies other meds besides insulin.  Her PCP is at [place]. 23-year-old female with type 1 diabetes insulin pump states she is about 8 weeks pregnant and has been throwing up all day today.  Noted blood sugars to be low on her monitor.  Unable to eat due to nausea.  Works as a EMS technician.  Denies fever headache cough shortness of breath diarrhea dysuria hematuria melena abdominal pain or other symptom.  Denies other meds besides insulin.  Her PCP is at [place].

## 2023-12-06 NOTE — CONSULT NOTE ADULT - ASSESSMENT
Physical Exam:   Vital Signs Last 24 Hrs  T(C): 36.7 (06 Dec 2023 16:15), Max: 36.7 (06 Dec 2023 16:15)  T(F): 98 (06 Dec 2023 16:15), Max: 98 (06 Dec 2023 16:15)  HR: 104 (06 Dec 2023 16:15) (104 - 104)  BP: 114/72 (06 Dec 2023 16:15) (114/72 - 114/72)  BP(mean): --  RR: 15 (06 Dec 2023 16:15) (15 - 15)  SpO2: 98% (06 Dec 2023 16:15) (98% - 98%)    Parameters below as of 06 Dec 2023 16:15  Patient On (Oxygen Delivery Method): room air    CAPILLARY BLOOD GLUCOSE      POCT Blood Glucose.: 137 mg/dL (06 Dec 2023 17:15)  POCT Blood Glucose.: 59 mg/dL (06 Dec 2023 16:48)  POCT Blood Glucose.: 95 mg/dL (06 Dec 2023 16:27)  POCT Blood Glucose.: 38 mg/dL (06 Dec 2023 16:07)      Cholesterol, Serum: 113 mg/dL (05.19.21 @ 08:36)     HDL Cholesterol, Serum: 22 mg/dL (05.19.21 @ 08:36)     LDL Cholesterol Calculated: 66 mg/dL (05.19.21 @ 08:36)     DIET: CC  >50%

## 2023-12-06 NOTE — ED PROVIDER NOTE - NSFOLLOWUPINSTRUCTIONS_ED_ALL_ED_FT
Hypoglycemia  Hypoglycemia occurs when the level of sugar (glucose) in the blood is too low. Hypoglycemia can happen in people who have or do not have diabetes. It can develop quickly, and it can be a medical emergency. For most people, a blood glucose level below 70 mg/dL (3.9 mmol/L) is considered hypoglycemia.    Glucose is a type of sugar that provides the body's main source of energy. Certain hormones (insulin and glucagon) control the level of glucose in the blood. Insulin lowers blood glucose, and glucagon raises blood glucose. Hypoglycemia can result from having too much insulin in the bloodstream, or from not eating enough food that contains glucose. You may also have reactive hypoglycemia, which happens within 4 hours after eating a meal.    What are the causes?  Hypoglycemia occurs most often in people who have diabetes and may be caused by:  Diabetes medicine.  Not eating enough, or not eating often enough.  Increased physical activity.  Drinking alcohol on an empty stomach.  If you do not have diabetes, hypoglycemia may be caused by:  A tumor in the pancreas.  Not eating enough, or not eating for long periods at a time (fasting).  A severe infection or illness.  Problems after having bariatric surgery.  Organ failure, such as kidney or liver failure.  Certain medicines.  What increases the risk?  Hypoglycemia is more likely to develop in people who:  Have diabetes and take medicines to lower blood glucose.  Abuse alcohol.  Have a severe illness.  What are the signs or symptoms?  Symptoms vary depending on whether the condition is mild, moderate, or severe.    Mild hypoglycemia    Hunger.  Sweating and feeling clammy.  Dizziness or feeling light-headed.  Sleepiness or restless sleep.  Nausea.  Increased heart rate.  Headache.  Blurry vision.  Mood changes, such as irritability or anxiety.  Tingling or numbness around the mouth, lips, or tongue.  Moderate hypoglycemia    Confusion and poor judgment.  Behavior changes.  Weakness.  Irregular heartbeat.  A change in coordination.  Severe hypoglycemia    Severe hypoglycemia is a medical emergency. It can cause:  Fainting.  Seizures.  Loss of consciousness (coma).  Death.  How is this diagnosed?  Hypoglycemia is diagnosed with a blood test to measure your blood glucose level. This blood test is done while you are having symptoms. Your health care provider may also do a physical exam and review your medical history.    How is this treated?  This condition can be treated by immediately eating or drinking something that contains sugar with 15 grams of fast-acting carbohydrate, such as:  4 oz (120 mL) of fruit juice.  4 oz (120 mL) of regular soda (not diet soda).  Several pieces of hard candy. Check food labels to find out how many pieces to eat for 15 grams.  1 Tbsp (15 mL) of sugar or honey.  4 glucose tablets.  1 tube of glucose gel.  Treating hypoglycemia if you have diabetes    Hands showing right hand using lancet pen on left ring finger, with glucometer in background.  If you are alert and able to swallow safely, follow the 15:15 rule:  Take 15 grams of a fast-acting carbohydrate. Talk with your health care provider about how much you should take. Options for getting 15 grams of fast-acting carbohydrate include:  Glucose tablets (take 4 tablets).  Several pieces of hard candy. Check food labels to find out how many pieces to eat for 15 grams.  4 oz (120 mL) of fruit juice.  4 oz (120 mL) of regular soda (not diet soda).  1 Tbsp (15 mL) of sugar or honey.  1 tube of glucose gel.  Check your blood glucose 15 minutes after you take the carbohydrate.  If the repeat blood glucose level is still at or below 70 mg/dL (3.9 mmol/L), take 15 grams of a carbohydrate again.  If your blood glucose level does not increase above 70 mg/dL (3.9 mmol/L) after 3 tries, seek emergency medical care.  After your blood glucose level returns to normal, eat a meal or a snack within 1 hour.  Treating severe hypoglycemia    Severe hypoglycemia is when your blood glucose level is below 54 mg/dL (3 mmol/L). Severe hypoglycemia is a medical emergency. Get medical help right away.    If you have severe hypoglycemia and you cannot eat or drink, you will need to be given glucagon. A family member or close friend should learn how to check your blood glucose and how to give you glucagon. Ask your health care provider if you need to have an emergency glucagon kit available.    Severe hypoglycemia may need to be treated in a hospital. The treatment may include getting glucose through an IV. You may also need treatment for the cause of your hypoglycemia.    Follow these instructions at home:  Medical alert bracelet.  General instructions    Take over-the-counter and prescription medicines only as told by your health care provider.  Monitor your blood glucose as told by your health care provider.  If you drink alcohol:  Limit how much you have to:  0–1 drink a day for women who are not pregnant.  0–2 drinks a day for men.  Know how much alcohol is in your drink. In the [place] one drink equals one 12 oz bottle of beer (355 mL), one 5 oz glass of wine (148 mL), or one 1½ oz glass of hard liquor (44 mL).  Be sure to eat food along with drinking alcohol.  Be aware that alcohol is absorbed quickly and may have lingering effects that may result in hypoglycemia later. Be sure to do ongoing glucose monitoring.  Keep all follow-up visits. This is important.  If you have diabetes:    Always have a fast-acting carbohydrate (15 grams) option with you to treat low blood glucose.  Follow your diabetes management plan as directed by your health care provider. Make sure you:  Know the symptoms of hypoglycemia. It is important to treat it right away to prevent it from becoming severe.  Check your blood glucose as often as told. Always check before and after exercise.  Always check your blood glucose before you drive a motorized vehicle.  Take your medicines as told.  Follow your meal plan. Eat on time, and do not skip meals.  Share your diabetes management plan with people in your workplace, school, and household.  Carry a medical alert card or wear medical alert jewelry.  Where to find more information  American Diabetes Association: www.diabetes.org  Contact a health care provider if:  You have problems keeping your blood glucose in your target range.  You have frequent episodes of hypoglycemia.  Get help right away if:  You continue to have hypoglycemia symptoms after eating or drinking something that contains 15 grams of fast-acting carbohydrate, and you cannot get your blood glucose above 70 mg/dL (3.9 mmol/L) while following the 15:15 rule.  Your blood glucose is below 54 mg/dL (3 mmol/L).  You have a seizure.  You faint.  These symptoms may represent a serious problem that is an emergency. Do not wait to see if the symptoms will go away. Get medical help right away. Call your local emergency services (911 in the [place] Do not drive yourself to the hospital.    Summary  Hypoglycemia occurs when the level of sugar (glucose) in the blood is too low.  Hypoglycemia can happen in people who have or do not have diabetes. It can develop quickly, and it can be a medical emergency.  Make sure you know the symptoms of hypoglycemia and how to treat it.  Always have a fast-acting carbohydrate option with you to treat low blood sugar.  This information is not intended to replace advice given to you by your health care provider. Make sure you discuss any questions you have with your health care provider.        Hyperemesis Gravidarum  Hyperemesis gravidarum is a severe form of nausea and vomiting that happens during pregnancy. Hyperemesis is worse than morning sickness. It may cause you to have nausea or vomiting all day for many days. It may keep you from eating and drinking enough food and liquids, which can lead to dehydration, malnutrition, and weight loss. Hyperemesis usually occurs during the first half (the first 20 weeks) of pregnancy. It often goes away once a woman is in her second half of pregnancy. However, sometimes hyperemesis continues through an entire pregnancy.    What are the causes?  The cause of this condition is not known. It may be associated with:  Changes in hormones in the body during pregnancy.  Changes in the gastrointestinal system.  Genetic or inherited conditions.  What are the signs or symptoms?  Symptoms of this condition include:  Severe nausea and vomiting that does not go away.  Problems keeping food down.  Weight loss.  Loss of body fluid (dehydration).  Loss of appetite. You may have no desire to eat or you may not like the food you have previously enjoyed.  How is this diagnosed?  This condition may be diagnosed based on your medical history, your symptoms, and a physical exam.    You may also have other tests, including:  Blood tests.  Urine tests.  Blood pressure tests.  Ultrasound to look for problems with the placenta or to check if you are pregnant with more than one baby.  How is this treated?  This condition is managed by controlling symptoms. This may include:  Following an eating plan. This can help to lessen nausea and vomiting.  Treatments that do not use medicine. These include acupressure bracelets, hypnosis, and eating or drinking foods or fluids that contain ginger, ginger ale, or ginger tea.  Taking prescription medicine or over-the-counter medicine as told by your health care provider.  Continuing to take prenatal vitamins. You may need to change what kind you take and when you take them. Follow your health care provider's instructions about prenatal vitamins.  An eating plan and medicines are often used together to help control symptoms. If medicines do not help relieve nausea and vomiting, you may need to receive fluids through an IV at the hospital.    Follow these instructions at home:  To help relieve your symptoms, listen to your body. Everyone is different and has different preferences. Find what works best for you. Here are some things you can try to help relieve your symptoms:    Meals and snacks      Eat 5–6 small meals daily instead of 3 large meals. Eating small meals and snacks can help you avoid an empty stomach.  Before getting out of bed, eat a couple of crackers to avoid moving around on an empty stomach.  Eat a protein-rich snack before bed. Examples include cheese and crackers, or a peanut butter sandwich made with 1 slice of whole-wheat bread and 1 tsp (5 g) of peanut butter.  Eat and drink slowly.  Try eating starchy foods as these are usually tolerated well. Examples include cereal, toast, bread, potatoes, pasta, rice, and pretzels.  Eat at least one serving of protein with your meals and snacks. Protein options include lean meats, poultry, seafood, beans, nuts, nut butters, eggs, cheese, and yogurt.  Eat or suck on things that have ginger in them. It may help to relieve nausea. Add ¼ tsp (0.44 g) ground ginger to hot tea, or choose ginger tea.  Fluids    It is important to stay hydrated. Try to:  Drink small amounts of fluids often.  Drink fluids 30 minutes before or after a meal to help lessen the feeling of a full stomach.  Drink 100% fruit juice or an electrolyte drink. An electrolyte drink contains sodium, potassium, and chloride.  Drink fluids that are cold, clear, and carbonated or sour. These include lemonade, ginger ale, lemon–lime soda, ice water, and sparkling water.  Things to avoid    Avoid the following:  Eating foods that trigger your symptoms. These may include spicy foods, coffee, high-fat foods, very sweet foods, and acidic foods.  Drinking more than 1 cup of fluid at a time.  Skipping meals. Nausea can be more intense on an empty stomach. If you cannot tolerate food, do not force it. Try sucking on ice chips or other frozen items and make up for missed calories later.  Lying down within 2 hours after eating.  Being exposed to environmental triggers. These may include food smells, smoky rooms, closed spaces, rooms with strong smells, warm or humid places, overly loud and noisy rooms, and rooms with motion or flickering lights. Try eating meals in a well-ventilated area that is free of strong smells.  Making quick and sudden changes in your movement.  Taking iron pills and multivitamins that contain iron. If you take prescription iron pills, do not stop taking them unless your health care provider approves.  Preparing food. The smell of food can spoil your appetite or trigger nausea.  General instructions    Brush your teeth or use a mouth rinse after meals.  Take over-the-counter and prescription medicines only as told by your health care provider.  Follow instructions from your health care provider about eating or drinking restrictions.  Talk with your health care provider about starting a supplement of vitamin B6.  Continue to take your prenatal vitamins as told by your health care provider. If you are having trouble taking your prenatal vitamins, talk with your health care provider about other options.  Keep all follow-up visits. This is important. Follow-up visits include prenatal visits.  Contact a health care provider if:  You have pain in your abdomen.  You have a severe headache.  You have vision problems.  You are losing weight.  You feel weak or dizzy.  You cannot eat or drink without vomiting, especially if this goes on for a full day.  Get help right away if:  You cannot drink fluids without vomiting.  You vomit blood.  You have constant nausea and vomiting.  You are very weak.  You faint.  You have a fever and your symptoms suddenly get worse.  Summary  Hyperemesis gravidarum is a severe form of nausea and vomiting that happens during pregnancy.  Making some changes to your eating habits may help relieve nausea and vomiting.  This condition may be managed with lifestyle changes and medicines as prescribed by your health care provider.  If medicines do not help relieve nausea and vomiting, you may need to receive fluids through an IV at the hospital.  This information is not intended to replace advice given to you by your health care provider. Make sure you discuss any questions you have with your health care provider.

## 2023-12-06 NOTE — ED PROVIDER NOTE - PATIENT PORTAL LINK FT
You can access the FollowMyHealth Patient Portal offered by [facility] by registering at the following website: [email or web address]. By joining [employer]’s FollowMyHealth portal, you will also be able to view your health information using other applications (apps) compatible with our system. You can access the FollowMyHealth Patient Portal offered by [facility] by registering at the following website: [email or web address]. By joining [employer]’s FollowMyHealth portal, you will also be able to view your health information using other applications (apps) compatible with our system.

## 2023-12-06 NOTE — CONSULT NOTE ADULT - PROBLEM SELECTOR RECOMMENDATION 9
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

## 2023-12-06 NOTE — ED ADULT NURSE REASSESSMENT NOTE - NSFALLUNIVINTERV_ED_ALL_ED
Bed/Stretcher in lowest position, wheels locked, appropriate side rails in place/Call bell, personal items and telephone in reach/Instruct patient to call for assistance before getting out of bed/chair/stretcher/Non-slip footwear applied when patient is off stretcher/[place] to call system/Physically safe environment - no spills, clutter or unnecessary equipment/Purposeful proactive rounding/Room/bathroom lighting operational, light cord in reach Bed/Stretcher in lowest position, wheels locked, appropriate side rails in place/Call bell, personal items and telephone in reach/Instruct patient to call for assistance before getting out of bed/chair/stretcher/Non-slip footwear applied when patient is off stretcher/[place] to call system/Physically safe environment - no spills, clutter or unnecessary equipment/Purposeful proactive rounding/Room/bathroom lighting operational, light cord in reach

## 2023-12-06 NOTE — ED ADULT NURSE NOTE - NSFALLUNIVINTERV_ED_ALL_ED
Bed/Stretcher in lowest position, wheels locked, appropriate side rails in place/Call bell, personal items and telephone in reach/Instruct patient to call for assistance before getting out of bed/chair/stretcher/Non-slip footwear applied when patient is off stretcher/[place] to call system/Physically safe environment - no spills, clutter or unnecessary equipment/Purposeful proactive rounding/Room/bathroom lighting operational, light cord in reach Bed/Stretcher in lowest position, wheels locked, appropriate side rails in place/Call bell, personal items and telephone in reach/Instruct patient to call for assistance before getting out of bed/chair/stretcher/Non-slip footwear applied when patient is off stretcher/[place] to call system/Physically safe environment - no spills, clutter or unnecessary equipment/Purposeful proactive rounding/Room/bathroom lighting operational, light cord in reach

## 2023-12-06 NOTE — ED ADULT NURSE NOTE - NS ED NOTE ABUSE RESPONSE YN
CARDIOLOGY        Patient Name: Sudarshan Moreno  :1943  Age: 80 y.o.  Primary Cardiologist: Crissy Mora MD  Encounter Provider:  SAMIR Dye    Date of Service: 10/30/23    CHIEF COMPLAINT / REASON FOR OFFICE VISIT     NONRHEUMATIC AVS      HISTORY OF PRESENT ILLNESS       HPI  Sudarshan Moreno is a 80 y.o. male who presents today for overdue evaluation, last seen 10/29/2020 .     Pt has a  history significant for MI in  for which he had angioplasty, MI in  where he had CABG.    In  patient was experiencing shortness of breath and had an abnormal treadmill test with normal nuclear imaging.  He did have a repeat cardiac catheterization in 2012 which revealed normal LV function, left main 90% distal stenosis, 100% mid vessel stenosis of the LAD, circumflex with 100% occlusion proximally and the RCA with 100% occlusion proximally.  Vein graft to LAD was patent, vein graft to the obtuse marginal branch which was patent, vein graft to distal RCA with good blood flow, vein graft to posterior descending that was patent, 90% stenosis after the graft and and a BRISSA was placed.  He also had BRISSA in the left main after rotablation.    In 2014 patient presented to the ER with AV node reentrant tachycardia that broke with bearing down.  Stress test revealed no ischemia but ST changes on treadmill.    Echocardiogram in 2019 revealed LVEF of 60% with moderate aortic valve stenosis.  Patient has been dealing with an autoimmune skin disease that is followed by [facility] and patient has had several rounds of steroids as well as methotrexate    In  patient had a repeat echocardiogram which revealed grade 1 diastolic dysfunction, mild atrial enlargement on the left side, moderate to severe aortic valve stenosis and mild to moderate mitral valve regurgitation with mild to moderate tricuspid valve regurgitation..    Patient presents today with his wife.  Patient was  "evaluated by heme/onc at last visit, he follows for his autoimmune disease that is followed with [facility].  At the visit, Dr. Hall noted a progression in heart murmur and reached out to Dr. Mora, she ordered an echo with was done 10/27 and revealed severe aortic valve stenosis.  He was scheduled to see Dr. Mora today, but she is unfortunately out of clinic today.  Patient reports that he does have shortness of breath when climbing steps as well as some generalized fatigue.  He does have intermittent episodes of lower extremity edema secondary to methotrexate.  Denies chest discomfort, palpitations, lightheadedness, near-syncope.    The following portions of the patient's history were reviewed and updated as appropriate: allergies, current medications, past family history, past medical history, past social history, past surgical history and problem list.      VITAL SIGNS     Visit Vitals  /60   Pulse 72   Ht 177.8 cm (70\")   Wt 68.9 kg (151 lb 12.8 oz)   BMI 21.78 kg/m²         Wt Readings from Last 3 Encounters:   10/30/23 68.9 kg (151 lb 12.8 oz)   10/27/23 67.6 kg (149 lb)   10/17/23 67.7 kg (149 lb 3.2 oz)     Body mass index is 21.78 kg/m².      REVIEW OF SYSTEMS   Review of Systems   Constitutional: Positive for malaise/fatigue. Negative for chills, fever, weight gain and weight loss.   Cardiovascular:  Positive for dyspnea on exertion and leg swelling.   Respiratory:  Negative for cough, snoring and wheezing.    Hematologic/Lymphatic: Negative for bleeding problem. Does not bruise/bleed easily.   Skin:  Negative for color change.   Musculoskeletal:  Negative for falls, joint pain and myalgias.   Gastrointestinal:  Negative for melena.   Genitourinary:  Negative for hematuria.   Neurological:  Negative for excessive daytime sleepiness.   Psychiatric/Behavioral:  Negative for depression. The patient is not nervous/anxious.            PHYSICAL EXAMINATION     Constitutional:       Appearance: " Normal appearance. Well-developed.   Eyes:      Conjunctiva/sclera: Conjunctivae normal.   Neck:      Vascular: No carotid bruit.   Pulmonary:      Effort: Pulmonary effort is normal.      Breath sounds: Normal breath sounds.   Cardiovascular:      Normal rate. Regular rhythm. Normal S1. Normal S2.       Murmurs: There is a grade 4/6 blowing systolic murmur at the URSB and ULSB.      No gallop.  No click. No rub.   Edema:     Peripheral edema absent.   Musculoskeletal: Normal range of motion. Skin:     General: Skin is warm and dry.   Neurological:      Mental Status: Alert and oriented to person, place, and time.      GCS: GCS eye subscore is 4. GCS verbal subscore is 5. GCS motor subscore is 6.   Psychiatric:         Speech: Speech normal.         Behavior: Behavior normal.         Thought Content: Thought content normal.         Judgment: Judgment normal.           REVIEWED DATA       ECG 12 Lead    Date/Time: 10/30/2023 10:51 AM  Performed by: Karen Méndez APRN    Authorized by: Karen Méndez APRN  Comparison: compared with previous ECG from 9/2/2020  Rhythm: sinus rhythm  Rate: normal  BPM: 72  Conduction: 1st degree AV block  Q waves: II, III and aVF    T inversion: III, aVR, aVF, V5 and V6  QRS axis: normal    Clinical impression: abnormal EKG          Cardiac Procedures:  Cardiac catheterization 3/2012.  Normal LV function, left main with 90% distal stenosis, LAD with 100% mid vessel lesion, circumflex 100% occlusion proximally and % occlusion proximally.  Vein graft to LAD which was patent, vein graft to the obtuse marginal branch which was patent, vein graft to the distal RCA with good blood flow, vein graft to the posterior descending artery that was patent but with a 90% stenosis after the graft ended.  BRISSA stent placed to the native posterior descending artery going through the vein graft.  Also BRISSA to the left main after rotablation.  Echocardiogram 3/11/2019.  LVEF 60%  Calcification  of the aortic valve affecting the non-, left and right coronary cusp.  Moderate aortic valve stenosis with mean pressure gradient 25.0 mmHg.  Echocardiogram 1/28/2020.  LVEF 59%.  All LV segments contract normally.  Mild to moderate hypertrophy.  Grade 1 diastolic dysfunction.  Myocardium is markedly bright and echogenic.  Left atrial cavity size is mildly dilated.  Severe thickening of the aortic valve with no aortic valve regurgitation.  Moderate to severe aortic valve stenosis.  Bileaflet mitral valve thickening present.  Mild to moderate mitral valve regurgitation.  No significant mitral valve stenosis.  Tricuspid valve with diffuse opening.  No evidence of tricuspid valve stenosis.  Mild to moderate tricuspid valve regurgitation.  Myocardial perfusion stress test 1/28/2020.  Nondiagnostic ST changes that do not meet criteria for ischemia.  Normal myocardial perfusion study without evidence of ischemia.  Zio monitor 2/17/2020.  28 episodes of SVT with the longest lasting 1 minute and 47 seconds.  Echocardiogram 10/27/2023.  LVEF 57%.  LV wall thickness consistent with mild LVH.  All LV segments contract normally.  Mild mitral valve regurgitation.  Trace tricuspid valve regurgitation.  Severe aortic valve stenosis.  Aortic valve area 0.5 cm².  Mean pressure gradient 42 mmHg.  Peak velocity of flow distal to the aortic valve 411.7 cm/s.    Lipid Panel          4/20/2023    09:31   Lipid Panel   Total Cholesterol 142    Triglycerides 91    HDL Cholesterol 43    VLDL Cholesterol 17    LDL Cholesterol  82        Lab Results   Component Value Date     10/17/2023     09/15/2023    K 4.6 10/17/2023    K 4.9 09/15/2023     10/17/2023     09/15/2023    CO2 22.7 10/17/2023    CO2 20.5 (L) 09/15/2023    BUN 25 (H) 10/17/2023    BUN 24 (H) 09/15/2023    CREATININE 0.88 10/17/2023    CREATININE 0.95 09/15/2023    EGFRIFNONA 80 12/16/2021    EGFRIFNONA 105 11/12/2021    EGFRIFAFRI 92 12/16/2021     EGFRIFAFRI >60 11/04/2021    GLUCOSE 220 (H) 10/17/2023    GLUCOSE 251 (H) 09/15/2023    CALCIUM 9.0 10/17/2023    CALCIUM 8.9 09/15/2023    PROTENTOTREF 6.5 04/20/2023    PROTENTOTREF 6.0 12/16/2021    ALBUMIN 3.9 10/17/2023    ALBUMIN 4.1 09/15/2023    BILITOT 0.2 10/17/2023    BILITOT 0.3 09/15/2023    AST 21 10/17/2023    AST 21 09/15/2023    ALT 15 10/17/2023    ALT 18 09/15/2023     Lab Results   Component Value Date    WBC 6.93 10/17/2023    WBC 7.74 09/15/2023    HGB 12.6 (L) 10/17/2023    HGB 12.5 (L) 09/15/2023    HCT 39.8 10/17/2023    HCT 39.8 09/15/2023    MCV 90.0 10/17/2023    MCV 90.7 09/15/2023     10/17/2023     09/15/2023     Lab Results   Component Value Date    PROBNP 1,416.0 09/02/2020    .0 (H) 08/28/2020     Lab Results   Component Value Date    TROPONINI 0.015 08/28/2020    TROPONINT <0.010 09/02/2020     Lab Results   Component Value Date    TSH 3.130 04/20/2023    TSH 3.390 10/17/2022             ASSESSMENT & PLAN     Diagnoses and all orders for this visit:    1. Severe aortic valve stenosis (Primary)  Patient with progressively worsening aortic valve stenosis, last echocardiogram in 2020 revealed moderate to severe aortic valve stenosis.  Echocardiogram 10/27/2023 revealed severe aortic valve stenosis with an aortic valve area 0.51 cm², mean pressure gradient 42.4 mmHg, peak velocity of flow distal to the valve 411.7 cm/s  Patient with dyspnea with exertion when climbing steps as well as some generalized fatigue  Patient has had cardiac catheterization with Dr. Salcido in the past, I have recommended referral to the TAVR team to see if patient would potentially be a candidate for TAVR.  Patient was given printed material in clinic about aortic valve stenosis as well as TAVR.  -     Ambulatory Referral to Cardiology    2. Coronary artery disease involving native coronary artery of native heart without angina pectoris  Prior CABG as well as stenting to sites of the  posterior descending artery just distal to the graft as well as left main after rotablation  Denies angina or dyspnea  Patient does have T wave changes noted on ECG today  Continue atorvastatin, aspirin    3. Diastolic dysfunction  Denies dyspnea with exertion or edema    4. Type 2 diabetes mellitus with microalbuminuria, with long-term current use of insulin    5. Mixed hyperlipidemia  Continue atorvastatin    Other orders  -     ECG 12 Lead      Time Spent: I spent 60 minutes caring for Sudarshan on this date of service. This time includes time spent by me in the following activities: preparing for the visit, reviewing tests, performing a medically appropriate examination and/or evaluation, counseling and educating the patient/family/caregiver, ordering medications, tests, or procedures, referring and communicating with other health care professionals, documenting information in the medical record, independently interpreting results and communicating that information with the patient/family/caregiver, and care coordination.   I spent 3 minutes on the separately reported service of ECG. This time is not included in the time used to support the E/M service also reported today.        Future Appointments         Provider Department Center    11/10/2023 10:40 AM Jarad Salcido MD [facility] GROUP CARDIOLOGY [place]    11/17/2023 10:00 AM LAB CHAIR 1 [facility] ONCOLOGY [facility] LAB [place]    11/17/2023 10:30 AM INJECTION CHAIR [facility] INFUSION [facility] [place]    12/15/2023 10:10 AM LAB CHAIR 6 [facility] ONCOLOGY CBC LAB [place]    12/15/2023 10:45 AM INJECTION CHAIR [facility] INFUSION CBC [place]    1/12/2024 10:00 AM LAB CHAIR 5 [facility] ONCOLOGY CBC LAB [place]    1/12/2024 10:30 AM INJECTION CHAIR [facility] INFUSION CBC [place]    2/9/2024 10:00 AM LAB CHAIR 5 [facility] ONCOLOGY CBC LAB [place]    2/9/2024 10:30 AM INJECTION CHAIR [facility] INFUSION CBC [place]    3/8/2024 10:00 AM LAB CHAIR 1 [facility] ONCOLOGY CBC LAB [place]    3/8/2024 10:30 AM INJECTION CHAIR [facility] INFUSION CBC [place]    4/11/2024 11:15 AM Chuck Mock MD [facility] PRIMARY CARE [place]    4/12/2024 2:20 PM LAB CHAIR 5 [facility] ONCOLOGY CBC LAB [place]    4/12/2024 2:40 PM Gustabo Hall MD [facility] HEMATOLOGY & ONCOLOGY [place]    4/12/2024 3:00 PM INJECTION CHAIR [facility] INFUSION CBC [place]                  MEDICATIONS         Discharge Medications            Accurate as of October 30, 2023 12:27 PM. If you have any questions, ask your nurse or doctor.                Continue These Medications        Instructions Start Date   Accu-Chek FastClix Lancets misc   Use to check blood sugar once a day E11.8      Accu-Chek Guide test strip  Generic drug: glucose blood   USE TO TEST BLOOD SUGAR 3 TIMES DAILY  E11.65      Accu-Chek Multiclix Lancet Dev kit   TID.      aspirin 81 MG tablet   1 tablet, Oral, Daily      atorvastatin 20 MG tablet  Commonly known as: LIPITOR   20 mg, Oral, Daily      bisacodyl 5 MG EC tablet  Commonly known as: DULCOLAX   5 mg, Oral, Daily PRN      clobetasol 0.05 % cream  Commonly known as: TEMOVATE   60 g, Topical, 2 Times Daily      clobetasol 0.05 % external solution  Commonly known as: TEMOVATE   Please apply a thin layer to affected areas on scalp daily as needed      FOLIC ACID PO   Oral      glimepiride 2 MG tablet  Commonly known as: AMARYL   1 tablet twice daily with meals      [employer] Pen Needles 31G X 5 MM misc  Generic drug: Insulin Pen Needle   USE DAILY WITH INJECTIONS      metFORMIN 1000 MG tablet  Commonly known as: GLUCOPHAGE   1,000 mg, Oral, 2 Times Daily      methotrexate 2.5 MG tablet   4 pills week       Multi-Vitamin tablet  Generic drug: multivitamin   1 tablet, Oral, Daily      Tresiba FlexTouch 100 UNIT/ML solution pen-injector injection  Generic drug: insulin degludec   Inject 15 units once daily in the AM                   **Dragon Disclaimer:   Much of this encounter note is an electronic transcription/translation of spoken language to printed text. The electronic translation of spoken language may permit erroneous, or at times, nonsensical words or phrases to be inadvertently transcribed. Although I have reviewed the note for such errors, some may still exist.    Yes

## 2023-12-06 NOTE — PROVIDER CONTACT NOTE (HYPOGLYCEMIA EVENT) - NS PROVIDER CONTACT BACKGROUND-HYPO
Age: 23y    Gender: Female    POCT Blood Glucose:  59 mg/dL (12-06-23 @ 16:48)  95 mg/dL (12-06-23 @ 16:27)  38 mg/dL (12-06-23 @ 16:07)      eMAR:  dextrose 50% Injectable   50 milliLiter(s) IV Push (12-06-23 @ 16:29)

## 2023-12-27 ENCOUNTER — TRANSCRIPTION ENCOUNTER (OUTPATIENT)
Age: 23
End: 2023-12-27

## 2024-03-23 ENCOUNTER — EMERGENCY (EMERGENCY)
Facility: HOSPITAL | Age: 24
LOS: 1 days | Discharge: STILL ADMITTED OR ADMINISTRATIVE | End: 2024-03-23
Admitting: EMERGENCY MEDICINE
Payer: SELF-PAY

## 2024-03-23 ENCOUNTER — APPOINTMENT (OUTPATIENT)
Dept: ANTEPARTUM | Facility: CLINIC | Age: 24
End: 2024-03-23
Payer: COMMERCIAL

## 2024-03-23 ENCOUNTER — INPATIENT (INPATIENT)
Facility: HOSPITAL | Age: 24
LOS: 1 days | Discharge: HOME | End: 2024-03-25
Attending: SPECIALIST | Admitting: SPECIALIST
Payer: COMMERCIAL

## 2024-03-23 ENCOUNTER — ASOB RESULT (OUTPATIENT)
Age: 24
End: 2024-03-23

## 2024-03-23 VITALS
TEMPERATURE: 99 F | HEART RATE: 105 BPM | SYSTOLIC BLOOD PRESSURE: 122 MMHG | OXYGEN SATURATION: 97 % | RESPIRATION RATE: 20 BRPM | DIASTOLIC BLOOD PRESSURE: 84 MMHG

## 2024-03-23 VITALS
HEART RATE: 102 BPM | RESPIRATION RATE: 14 BRPM | DIASTOLIC BLOOD PRESSURE: 79 MMHG | SYSTOLIC BLOOD PRESSURE: 121 MMHG | TEMPERATURE: 98 F

## 2024-03-23 DIAGNOSIS — R11.2 NAUSEA WITH VOMITING, UNSPECIFIED: ICD-10-CM

## 2024-03-23 DIAGNOSIS — Z90.410 ACQUIRED TOTAL ABSENCE OF PANCREAS: Chronic | ICD-10-CM

## 2024-03-23 DIAGNOSIS — Z90.89 ACQUIRED ABSENCE OF OTHER ORGANS: Chronic | ICD-10-CM

## 2024-03-23 DIAGNOSIS — Z98.890 OTHER SPECIFIED POSTPROCEDURAL STATES: Chronic | ICD-10-CM

## 2024-03-23 DIAGNOSIS — O26.899 OTHER SPECIFIED PREGNANCY RELATED CONDITIONS, UNSPECIFIED TRIMESTER: ICD-10-CM

## 2024-03-23 DIAGNOSIS — E10.9 TYPE 1 DIABETES MELLITUS WITHOUT COMPLICATIONS: ICD-10-CM

## 2024-03-23 DIAGNOSIS — M86.10 OTHER ACUTE OSTEOMYELITIS, UNSPECIFIED SITE: ICD-10-CM

## 2024-03-23 DIAGNOSIS — N93.9 ABNORMAL UTERINE AND VAGINAL BLEEDING, UNSPECIFIED: ICD-10-CM

## 2024-03-23 LAB
A1C WITH ESTIMATED AVERAGE GLUCOSE RESULT: 6.6 % — HIGH (ref 4–5.6)
ALBUMIN SERPL ELPH-MCNC: 3.3 G/DL — SIGNIFICANT CHANGE UP (ref 3.3–5)
ALBUMIN SERPL ELPH-MCNC: 3.3 G/DL — SIGNIFICANT CHANGE UP (ref 3.3–5)
ALP SERPL-CCNC: 52 U/L — SIGNIFICANT CHANGE UP (ref 40–120)
ALP SERPL-CCNC: 54 U/L — SIGNIFICANT CHANGE UP (ref 40–120)
ALT FLD-CCNC: 16 U/L — SIGNIFICANT CHANGE UP (ref 4–33)
ALT FLD-CCNC: 17 U/L — SIGNIFICANT CHANGE UP (ref 4–33)
AMYLASE P1 CFR SERPL: 44 U/L — SIGNIFICANT CHANGE UP (ref 25–125)
ANION GAP SERPL CALC-SCNC: 11 MMOL/L — SIGNIFICANT CHANGE UP (ref 7–14)
ANION GAP SERPL CALC-SCNC: 14 MMOL/L — SIGNIFICANT CHANGE UP (ref 7–14)
APPEARANCE UR: CLEAR — SIGNIFICANT CHANGE UP
APTT BLD: 28.7 SEC — SIGNIFICANT CHANGE UP (ref 24.5–35.6)
AST SERPL-CCNC: 24 U/L — SIGNIFICANT CHANGE UP (ref 4–32)
AST SERPL-CCNC: 29 U/L — SIGNIFICANT CHANGE UP (ref 4–32)
B-OH-BUTYR SERPL-SCNC: <0 MMOL/L — SIGNIFICANT CHANGE UP (ref 0–0.4)
BASE EXCESS BLDCOV CALC-SCNC: -3.3 MMOL/L — SIGNIFICANT CHANGE UP (ref -9.3–0.3)
BASOPHILS # BLD AUTO: 0.02 K/UL — SIGNIFICANT CHANGE UP (ref 0–0.2)
BASOPHILS NFR BLD AUTO: 0.3 % — SIGNIFICANT CHANGE UP (ref 0–2)
BILIRUB SERPL-MCNC: 0.3 MG/DL — SIGNIFICANT CHANGE UP (ref 0.2–1.2)
BILIRUB SERPL-MCNC: 0.4 MG/DL — SIGNIFICANT CHANGE UP (ref 0.2–1.2)
BILIRUB UR-MCNC: NEGATIVE — SIGNIFICANT CHANGE UP
BLD GP AB SCN SERPL QL: NEGATIVE — SIGNIFICANT CHANGE UP
BUN SERPL-MCNC: 10 MG/DL — SIGNIFICANT CHANGE UP (ref 7–23)
BUN SERPL-MCNC: 9 MG/DL — SIGNIFICANT CHANGE UP (ref 7–23)
C DIFF GDH STL QL: SIGNIFICANT CHANGE UP
C DIFF GDH STL QL: SIGNIFICANT CHANGE UP
CALCIUM SERPL-MCNC: 8.6 MG/DL — SIGNIFICANT CHANGE UP (ref 8.4–10.5)
CALCIUM SERPL-MCNC: 8.8 MG/DL — SIGNIFICANT CHANGE UP (ref 8.4–10.5)
CHLORIDE SERPL-SCNC: 102 MMOL/L — SIGNIFICANT CHANGE UP (ref 98–107)
CHLORIDE SERPL-SCNC: 97 MMOL/L — LOW (ref 98–107)
CO2 BLDCOV-SCNC: 23 MMOL/L — SIGNIFICANT CHANGE UP
CO2 SERPL-SCNC: 20 MMOL/L — LOW (ref 22–31)
CO2 SERPL-SCNC: 23 MMOL/L — SIGNIFICANT CHANGE UP (ref 22–31)
COLOR SPEC: YELLOW — SIGNIFICANT CHANGE UP
CREAT SERPL-MCNC: 0.41 MG/DL — LOW (ref 0.5–1.3)
CREAT SERPL-MCNC: 0.46 MG/DL — LOW (ref 0.5–1.3)
DIFF PNL FLD: NEGATIVE — SIGNIFICANT CHANGE UP
EGFR: 136 ML/MIN/1.73M2 — SIGNIFICANT CHANGE UP
EGFR: 140 ML/MIN/1.73M2 — SIGNIFICANT CHANGE UP
EOSINOPHIL # BLD AUTO: 0.04 K/UL — SIGNIFICANT CHANGE UP (ref 0–0.5)
EOSINOPHIL NFR BLD AUTO: 0.7 % — SIGNIFICANT CHANGE UP (ref 0–6)
ESTIMATED AVERAGE GLUCOSE: 143 — SIGNIFICANT CHANGE UP
FERRITIN SERPL-MCNC: 9 NG/ML — LOW (ref 15–150)
GAS PNL BLDCOV: 7.35 — SIGNIFICANT CHANGE UP (ref 7.25–7.45)
GLUCOSE SERPL-MCNC: 134 MG/DL — HIGH (ref 70–99)
GLUCOSE SERPL-MCNC: 308 MG/DL — HIGH (ref 70–99)
GLUCOSE UR QL: 500 MG/DL
HCO3 BLDCOV-SCNC: 22 MMOL/L — SIGNIFICANT CHANGE UP
HCT VFR BLD CALC: 25.3 % — LOW (ref 34.5–45)
HCT VFR BLD CALC: 26.5 % — LOW (ref 34.5–45)
HGB BLD-MCNC: 8.4 G/DL — LOW (ref 11.5–15.5)
HGB BLD-MCNC: 8.7 G/DL — LOW (ref 11.5–15.5)
HIV 1+2 AB+HIV1 P24 AG SERPL QL IA: SIGNIFICANT CHANGE UP
IANC: 4.52 K/UL — SIGNIFICANT CHANGE UP (ref 1.8–7.4)
IMM GRANULOCYTES NFR BLD AUTO: 0.3 % — SIGNIFICANT CHANGE UP (ref 0–0.9)
INR BLD: 1 RATIO — SIGNIFICANT CHANGE UP (ref 0.85–1.18)
IRON SATN MFR SERPL: 11 % — LOW (ref 14–50)
IRON SATN MFR SERPL: 55 UG/DL — SIGNIFICANT CHANGE UP (ref 30–160)
KETONES UR-MCNC: ABNORMAL MG/DL
LACTATE SERPL-SCNC: 2.3 MMOL/L — HIGH (ref 0.5–2)
LACTATE SERPL-SCNC: 4.2 MMOL/L — CRITICAL HIGH (ref 0.5–2)
LEUKOCYTE ESTERASE UR-ACNC: NEGATIVE — SIGNIFICANT CHANGE UP
LIDOCAIN IGE QN: 7 U/L — SIGNIFICANT CHANGE UP (ref 7–60)
LYMPHOCYTES # BLD AUTO: 0.87 K/UL — LOW (ref 1–3.3)
LYMPHOCYTES # BLD AUTO: 14.5 % — SIGNIFICANT CHANGE UP (ref 13–44)
MCHC RBC-ENTMCNC: 31.1 PG — SIGNIFICANT CHANGE UP (ref 27–34)
MCHC RBC-ENTMCNC: 31.4 PG — SIGNIFICANT CHANGE UP (ref 27–34)
MCHC RBC-ENTMCNC: 32.8 GM/DL — SIGNIFICANT CHANGE UP (ref 32–36)
MCHC RBC-ENTMCNC: 33.2 GM/DL — SIGNIFICANT CHANGE UP (ref 32–36)
MCV RBC AUTO: 93.7 FL — SIGNIFICANT CHANGE UP (ref 80–100)
MCV RBC AUTO: 95.7 FL — SIGNIFICANT CHANGE UP (ref 80–100)
MONOCYTES # BLD AUTO: 0.55 K/UL — SIGNIFICANT CHANGE UP (ref 0–0.9)
MONOCYTES NFR BLD AUTO: 9.1 % — SIGNIFICANT CHANGE UP (ref 2–14)
NEUTROPHILS # BLD AUTO: 4.52 K/UL — SIGNIFICANT CHANGE UP (ref 1.8–7.4)
NEUTROPHILS NFR BLD AUTO: 75.1 % — SIGNIFICANT CHANGE UP (ref 43–77)
NITRITE UR-MCNC: NEGATIVE — SIGNIFICANT CHANGE UP
NRBC # BLD: 0 /100 WBCS — SIGNIFICANT CHANGE UP (ref 0–0)
NRBC # BLD: 0 /100 WBCS — SIGNIFICANT CHANGE UP (ref 0–0)
NRBC # FLD: 0 K/UL — SIGNIFICANT CHANGE UP (ref 0–0)
NRBC # FLD: 0 K/UL — SIGNIFICANT CHANGE UP (ref 0–0)
PCO2 BLDCOV: 40 MMHG — SIGNIFICANT CHANGE UP (ref 27–49)
PH UR: 6.5 — SIGNIFICANT CHANGE UP (ref 5–8)
PLATELET # BLD AUTO: 210 K/UL — SIGNIFICANT CHANGE UP (ref 150–400)
PLATELET # BLD AUTO: 223 K/UL — SIGNIFICANT CHANGE UP (ref 150–400)
PO2 BLDCOA: 88 MMHG — HIGH (ref 17–41)
POTASSIUM SERPL-MCNC: 3.7 MMOL/L — SIGNIFICANT CHANGE UP (ref 3.5–5.3)
POTASSIUM SERPL-MCNC: 4.2 MMOL/L — SIGNIFICANT CHANGE UP (ref 3.5–5.3)
POTASSIUM SERPL-SCNC: 3.7 MMOL/L — SIGNIFICANT CHANGE UP (ref 3.5–5.3)
POTASSIUM SERPL-SCNC: 4.2 MMOL/L — SIGNIFICANT CHANGE UP (ref 3.5–5.3)
PROT SERPL-MCNC: 5.9 G/DL — LOW (ref 6–8.3)
PROT SERPL-MCNC: 6.1 G/DL — SIGNIFICANT CHANGE UP (ref 6–8.3)
PROT UR-MCNC: NEGATIVE MG/DL — SIGNIFICANT CHANGE UP
PROTHROM AB SERPL-ACNC: 11.3 SEC — SIGNIFICANT CHANGE UP (ref 9.5–13)
RBC # BLD: 2.7 M/UL — LOW (ref 3.8–5.2)
RBC # BLD: 2.77 M/UL — LOW (ref 3.8–5.2)
RBC # FLD: 11.7 % — SIGNIFICANT CHANGE UP (ref 10.3–14.5)
RBC # FLD: 11.7 % — SIGNIFICANT CHANGE UP (ref 10.3–14.5)
RH IG SCN BLD-IMP: POSITIVE — SIGNIFICANT CHANGE UP
RH IG SCN BLD-IMP: POSITIVE — SIGNIFICANT CHANGE UP
SAO2 % BLDCOV: 96.9 % — SIGNIFICANT CHANGE UP
SODIUM SERPL-SCNC: 131 MMOL/L — LOW (ref 135–145)
SODIUM SERPL-SCNC: 136 MMOL/L — SIGNIFICANT CHANGE UP (ref 135–145)
SP GR SPEC: 1.03 — SIGNIFICANT CHANGE UP (ref 1–1.03)
TIBC SERPL-MCNC: 480 UG/DL — HIGH (ref 220–430)
UIBC SERPL-MCNC: 425 UG/DL — HIGH (ref 110–370)
UROBILINOGEN FLD QL: 0.2 MG/DL — SIGNIFICANT CHANGE UP (ref 0.2–1)
WBC # BLD: 5.49 K/UL — SIGNIFICANT CHANGE UP (ref 3.8–10.5)
WBC # BLD: 6.02 K/UL — SIGNIFICANT CHANGE UP (ref 3.8–10.5)
WBC # FLD AUTO: 5.49 K/UL — SIGNIFICANT CHANGE UP (ref 3.8–10.5)
WBC # FLD AUTO: 6.02 K/UL — SIGNIFICANT CHANGE UP (ref 3.8–10.5)

## 2024-03-23 PROCEDURE — 76770 US EXAM ABDO BACK WALL COMP: CPT | Mod: 26

## 2024-03-23 PROCEDURE — 76817 TRANSVAGINAL US OBSTETRIC: CPT | Mod: 26

## 2024-03-23 PROCEDURE — 99255 IP/OBS CONSLTJ NEW/EST HI 80: CPT

## 2024-03-23 PROCEDURE — L9996: CPT

## 2024-03-23 PROCEDURE — 76705 ECHO EXAM OF ABDOMEN: CPT | Mod: 26

## 2024-03-23 PROCEDURE — 99254 IP/OBS CNSLTJ NEW/EST MOD 60: CPT | Mod: GC

## 2024-03-23 PROCEDURE — 76815 OB US LIMITED FETUS(S): CPT | Mod: 26

## 2024-03-23 PROCEDURE — 74018 RADEX ABDOMEN 1 VIEW: CPT | Mod: 26

## 2024-03-23 RX ORDER — ERTAPENEM SODIUM 1 G/1
INJECTION, POWDER, LYOPHILIZED, FOR SOLUTION INTRAMUSCULAR; INTRAVENOUS
Refills: 0 | Status: DISCONTINUED | OUTPATIENT
Start: 2024-03-23 | End: 2024-03-24

## 2024-03-23 RX ORDER — SENNA PLUS 8.6 MG/1
2 TABLET ORAL AT BEDTIME
Refills: 0 | Status: DISCONTINUED | OUTPATIENT
Start: 2024-03-23 | End: 2024-03-25

## 2024-03-23 RX ORDER — SODIUM CHLORIDE 9 MG/ML
1000 INJECTION, SOLUTION INTRAVENOUS
Refills: 0 | Status: DISCONTINUED | OUTPATIENT
Start: 2024-03-23 | End: 2024-03-25

## 2024-03-23 RX ORDER — SODIUM CHLORIDE 9 MG/ML
1000 INJECTION INTRAMUSCULAR; INTRAVENOUS; SUBCUTANEOUS ONCE
Refills: 0 | Status: COMPLETED | OUTPATIENT
Start: 2024-03-23 | End: 2024-03-23

## 2024-03-23 RX ORDER — OMEPRAZOLE 10 MG/1
1 CAPSULE, DELAYED RELEASE ORAL
Refills: 0 | DISCHARGE

## 2024-03-23 RX ORDER — GLUCAGON INJECTION, SOLUTION 0.5 MG/.1ML
1 INJECTION, SOLUTION SUBCUTANEOUS ONCE
Refills: 0 | Status: DISCONTINUED | OUTPATIENT
Start: 2024-03-23 | End: 2024-03-25

## 2024-03-23 RX ORDER — DEXTROSE 50 % IN WATER 50 %
15 SYRINGE (ML) INTRAVENOUS ONCE
Refills: 0 | Status: DISCONTINUED | OUTPATIENT
Start: 2024-03-23 | End: 2024-03-25

## 2024-03-23 RX ORDER — ERTAPENEM SODIUM 1 G/1
1000 INJECTION, POWDER, LYOPHILIZED, FOR SOLUTION INTRAMUSCULAR; INTRAVENOUS EVERY 24 HOURS
Refills: 0 | Status: DISCONTINUED | OUTPATIENT
Start: 2024-03-24 | End: 2024-03-24

## 2024-03-23 RX ORDER — FOLIC ACID 0.8 MG
1 TABLET ORAL DAILY
Refills: 0 | Status: DISCONTINUED | OUTPATIENT
Start: 2024-03-23 | End: 2024-03-24

## 2024-03-23 RX ORDER — DEXTROSE 50 % IN WATER 50 %
25 SYRINGE (ML) INTRAVENOUS ONCE
Refills: 0 | Status: DISCONTINUED | OUTPATIENT
Start: 2024-03-23 | End: 2024-03-25

## 2024-03-23 RX ORDER — ERTAPENEM SODIUM 1 G/1
1000 INJECTION, POWDER, LYOPHILIZED, FOR SOLUTION INTRAMUSCULAR; INTRAVENOUS ONCE
Refills: 0 | Status: DISCONTINUED | OUTPATIENT
Start: 2024-03-23 | End: 2024-03-24

## 2024-03-23 RX ORDER — LIPASE/PROTEASE/AMYLASE 16-48-48K
1 CAPSULE,DELAYED RELEASE (ENTERIC COATED) ORAL
Refills: 0 | Status: DISCONTINUED | OUTPATIENT
Start: 2024-03-23 | End: 2024-03-24

## 2024-03-23 RX ORDER — PANTOPRAZOLE SODIUM 20 MG/1
40 TABLET, DELAYED RELEASE ORAL
Refills: 0 | Status: DISCONTINUED | OUTPATIENT
Start: 2024-03-23 | End: 2024-03-25

## 2024-03-23 RX ORDER — ACETAMINOPHEN 500 MG
1000 TABLET ORAL ONCE
Refills: 0 | Status: COMPLETED | OUTPATIENT
Start: 2024-03-23 | End: 2024-03-24

## 2024-03-23 RX ORDER — DEXTROSE 50 % IN WATER 50 %
12.5 SYRINGE (ML) INTRAVENOUS ONCE
Refills: 0 | Status: DISCONTINUED | OUTPATIENT
Start: 2024-03-23 | End: 2024-03-25

## 2024-03-23 RX ORDER — LIPASE/PROTEASE/AMYLASE 16-48-48K
10 CAPSULE,DELAYED RELEASE (ENTERIC COATED) ORAL
Refills: 0 | DISCHARGE

## 2024-03-23 RX ORDER — FERROUS SULFATE 325(65) MG
325 TABLET ORAL DAILY
Refills: 0 | Status: DISCONTINUED | OUTPATIENT
Start: 2024-03-23 | End: 2024-03-24

## 2024-03-23 RX ADMIN — SODIUM CHLORIDE 1000 MILLILITER(S): 9 INJECTION INTRAMUSCULAR; INTRAVENOUS; SUBCUTANEOUS at 15:15

## 2024-03-23 RX ADMIN — Medication 400 MILLIGRAM(S): at 22:40

## 2024-03-23 RX ADMIN — Medication 25 GRAM(S): at 23:35

## 2024-03-23 RX ADMIN — Medication 1000 MILLIGRAM(S): at 23:00

## 2024-03-23 NOTE — ED ADULT TRIAGE NOTE - CHIEF COMPLAINT QUOTE
arrives 24 weeks pregnant is , RAUL 24 c/o abdominal pain, N/V and pink discharge that started today. Seen last week at [facility] OB-GYN Dr. Olguin. past medical history of Whipple, type 1 DM (arrives with insulin pump), osteomyelitis, PICC line (on home IV antibiotics). fingerstick 269.

## 2024-03-23 NOTE — OB RN PATIENT PROFILE - NS_RELATIONSHIPOFSUPPORTPERSON_OBGYN_ALL_OB_FT
At [facility], one important tool we use to improve our patient services is our Patient Survey.  Following your visit you may receive our survey in the mail.    Please take the time to complete the survey.    If your visit with us was great, we want to hear about it.    If we can improve, please let us know how.         
boyfriend

## 2024-03-23 NOTE — OB PROVIDER H&P - PROBLEM SELECTOR PLAN 3
CBC, CMP, UA sent, urine culture  no evidence of active bleeding  NST reactive  Pt administered 4 units Admelog at 13:30, repeat blood sugar at 14:10 was 310  d/w Dr. Shi, Dr. Best, Dr. Brink PGY 4, and Dr. Gomez PGY 3  pt to be admitted for diabetic management, suspected DKA  ante orders  continuous EFM/TOCO  coags, lactate, beta hydroxy buturate, hemoglobin a1c, T&S, RPR, blood culture, VBG ordered  MFM, infectious disease, and dental consult  D/w Dr. Hatfield - 1L NS bolus ordered, renal US, GI consult w/ stool culture, GBS swab and r/o ROM, PICC line swab  prenatal labs sent  urine toxicology  fingersticks premeal and at bedtime  cont Ertapenem IV antibiotic, next dose tomorrow AM  consents obtained

## 2024-03-23 NOTE — OB RN TRIAGE NOTE - FALL HARM RISK - UNIVERSAL INTERVENTIONS
Bed in lowest position, wheels locked, appropriate side rails in place/Call bell, personal items and telephone in reach/Instruct patient to call for assistance before getting out of bed or chair/Non-slip footwear when patient is out of bed/[place] to call system/Physically safe environment - no spills, clutter or unnecessary equipment/Purposeful Proactive Rounding/Room/bathroom lighting operational, light cord in reach

## 2024-03-23 NOTE — OB PROVIDER TRIAGE NOTE - NSHPLABSRESULTS_GEN_ALL_CORE
8.7    6.02  )-----------( 223      ( 23 Mar 2024 14:13 )             26.5         131<L>  |  97<L>  |  9   ----------------------------<  308<H>  4.2   |  20<L>  |  0.46<L>    Ca    8.8      23 Mar 2024 14:13    TPro  6.1  /  Alb  3.3  /  TBili  0.4  /  DBili  x   /  AST  29  /  ALT  17  /  AlkPhos  54      Urinalysis Basic - ( 23 Mar 2024 14:24 )    Color: Yellow / Appearance: Clear / S.029 / pH: x  Gluc: x / Ketone: Trace mg/dL  / Bili: Negative / Urobili: 0.2 mg/dL   Blood: x / Protein: Negative mg/dL / Nitrite: Negative   Leuk Esterase: Negative / RBC: x / WBC x   Sq Epi: x / Non Sq Epi: x / Bacteria: x    CAPILLARY BLOOD GLUCOSE      POCT Blood Glucose.: 310 mg/dL (23 Mar 2024 14:12)  POCT Blood Glucose.: 269 mg/dL (23 Mar 2024 12:17)

## 2024-03-23 NOTE — OB PROVIDER TRIAGE NOTE - NSICDXPASTSURGICALHX_GEN_ALL_CORE_FT
PAST SURGICAL HISTORY:  H/O dilation and curettage     History of tonsillectomy     History of Whipple procedure

## 2024-03-23 NOTE — PROGRESS NOTE ADULT - SUBJECTIVE AND OBJECTIVE BOX
CC: 25 y/o female presents to ED with 1 day history of N/V, diarrhea and abdominal pain. patient is 24 weeks pregnant.    HPI: Patient has a history of RCT on #4 and #5 over a year ago. Patient reports she was unhappy with the work due to multiple crowns that did not fit properly. Patient went to see another dentist ~4 weeks ago who told her to go to the ED due to infection present in area of #4 and #5. Patient states an I&D was performed and patient was placed on antibiotics. Patient was seen by an oral surgeon about 2 weeks later who informed her she has osteomyelitis. Patient is currently on Ertapenem administered via PICC line. Patient reports improvement in symptoms.    Med HX: 24w1d    H/O type 1 diabetes mellitus    History of PCOS    Anxiety    Acute osteomyelitis    History of Whipple procedure    History of tonsillectomy    H/O dilation and curettage    ABD PAIN    90+    [email or web address]        RX:     PSHx: abx for osteomylitis:   abx for osteomylitis:   Creon 36,000 units oral delayed release capsule: 10 cap(s) orally 3 times a day  Creon 36,000 units oral delayed release capsule: 10 cap(s) orally 3 times a day  famotidine 20 mg oral tablet: 1 tab(s) orally  famotidine 20 mg oral tablet: 1 tab(s) orally  omeprazole 10 mg oral delayed release capsule: 1 cap(s) orally  omeprazole 10 mg oral delayed release capsule: 1 cap(s) orally    EOE:   TMJ (WNL)  Lacerations (-)  Trismus (-)  LAD (-)  Swelling (-)  LOC (-)  Dysphagia (-)    IOE:   Hard/Soft palate (WNL)  Tongue/Floor of Mouth (WNL)  Lacerations (-)  Labial Mucosa (WNL)  Buccal Mucosa (WNL)  Percussion (-)  Palpation (+) slight tenderness anterior to healing incision from I&D  Swelling (-)  Mobility (-)   Temporary crowns noted on #4 and #5, intact and no signs of infection.    Radiographs: None taken    Treatment: limited exam at bedside. Informed patient that there are no signs of dental infection at this moment. She is currently on antibiotics which appear to be helping. Offered to take CT of maxillofacial region but patient denied to to improved dental symptoms. She is followed by outside comprehensive dentist for #4 and #5. No treatment indicated by dental at this moment. Informed patient that the abdominal symptoms she is experiencing are likely unrelated to dental. All questions answered.    Recommendations:   1. Continue antibiotic regimen  2. F/U with outside comprehensive dentist    Mily Wilson DMD #[id number]

## 2024-03-23 NOTE — OB PROVIDER H&P - HISTORY OF PRESENT ILLNESS
23 y/o  at 24.1 weeks gestation sent up from ER via EMS w/ service dog c/o abdominal pain with nausea and vomiting for the past week, and pink vaginal discharge since last night. She was seen at [facility] last Friday for these complaints, and ruled out for labor and sent home w/ Robaxin. Pt took 2 pills and d/c'ed as she states it did not help her. Pt then saw private OB following Monday (3/18) and rx'ed her Indomethacin (pharmacy never filled), and omeprazole. 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 Pt states she ate breakfast ate 9a (bagel) and vomited soon after. Pt's blood sugar downstairs in ER was 269. Pt currently has PICC line receiving Ertapenem IV at home x4 weeks for osteomyelitis from an infected root canal approx 1 year ago. Pt also endorses mild intermittent period-like cramping with intermittent diarrhea. Denies LOF. Endorses +FM.    PNC with Linus Yepez; last seen on 3/18  Last ate at 9am  Allergies: PCN (anaphylaxis)  Current medications: PNV, Creon 64938 units 10 capsules TID (last took 5 pills this AM with breakfast sandwich, then vomited after), Ertapenem qd through PICC line for last 4 weeks (last dose today, home health nurse maintains), famotidine 20mg qd, Omeprazole 20mg qd  Medical/surgical history:      -T1DM on insulin pump. 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 In DBT program 3x/week. Feels safe at home; lives with significant other    Endocrine: sees Dr. Head in [place] [phone]  Dental: sees Dr. Nicole Mulligan in [facility] [phone]  OB: Dr. Linus Yepez

## 2024-03-23 NOTE — OB PROVIDER TRIAGE NOTE - NSHPPHYSICALEXAM_GEN_ALL_CORE
A&O x3  NAD  lungs: clear bilaterally  heart: regular rate and rhythm  abdomen: soft, nontender  back: L CVA tenderness  SSE: cervix closed, no blood visualized in the vault  TVUS: All images saved in ASOB       2.8-3.0cm cervical length, no funneling or dynamic changes  TAUS: All images saved in ASOB       footling breech presentation       posterior placenta       MVP 5.7       +FM       FHR 156bpm    Vital Signs Last 24 Hrs  T(C): 36.7 (23 Mar 2024 13:25), Max: 37.1 (23 Mar 2024 12:21)  T(F): 98.06 (23 Mar 2024 13:25), Max: 98.8 (23 Mar 2024 12:21)  HR: 88 (23 Mar 2024 15:29) (87 - 105)  BP: 113/70 (23 Mar 2024 15:29) (103/53 - 122/84)  BP(mean): --  RR: 14 (23 Mar 2024 13:00) (14 - 20)  SpO2: 97% (23 Mar 2024 12:21) (97% - 97%) A&O x3  NAD  lungs: clear bilaterally  heart: regular rate and rhythm  abdomen: soft, nontender  back: L CVA tenderness  SSE: cervix closed, no blood visualized in the vault. Neg pooling, neg nitrazine, neg ferning. GBS culture obtained  TVUS: All images saved in ASOB       2.8-3.0cm cervical length, no funneling or dynamic changes  TAUS: All images saved in ASOB       footling breech presentation       posterior placenta       MVP 5.7       +FM       FHR 156bpm    Vital Signs Last 24 Hrs  T(C): 36.7 (23 Mar 2024 13:25), Max: 37.1 (23 Mar 2024 12:21)  T(F): 98.06 (23 Mar 2024 13:25), Max: 98.8 (23 Mar 2024 12:21)  HR: 88 (23 Mar 2024 15:29) (87 - 105)  BP: 113/70 (23 Mar 2024 15:29) (103/53 - 122/84)  BP(mean): --  RR: 14 (23 Mar 2024 13:00) (14 - 20)  SpO2: 97% (23 Mar 2024 12:21) (97% - 97%)

## 2024-03-23 NOTE — OB PROVIDER H&P - NSOBVTERISKASSESS_OBGYN_ALL_OB_FT
----- Message from John Vargas MD sent at 11/1/2022  1:25 PM CDT -----  Please complete home oxygen order as much as possible with 6MWT results. thanks   OBAntePartum Assessment Completed on: 23-Mar-2024 16:58

## 2024-03-23 NOTE — CONSULT NOTE ADULT - SUBJECTIVE AND OBJECTIVE BOX
24 y.o.  presented for nausea, emesis, and diarrhea.  At the time of my evaluation, this below information is a per patient history since records were not available to confirm.     Patient had episodes of intermittent emesis and some back pain over the last week. Patient reported the emesis was more after eating. Patient states that she came to the hospital after having some more abdominal pain with the emesis today. Patient also reports episode of red blood after using the bathroom today. Patient normal fetal movement and states the pain felt like cramping but unsure if it is contractions. She denies leakage of fluid.     Patient also had had complicated tooth infection over the last year failing multiple antibiotics. She was told she had infection in the bone now and required treatment with invanz IV over the last few weeks with home care.    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 There was then diagnose this was related to finding in her pancreatis which initially was a pancreatic mass but then was found to be related to insulin production in the pancreas. She had surgical treatment with resolution of that symptoms but then had iatrogenic diabetes and pancreatic insuffiencey because of pancreatic removal along with other associated GI organs.     Medications: Creon (pancreatic enzyme replacement), Invanz, Famotidine, omeprazole  Allergies: penicilli    ICU Vital Signs Last 24 Hrs  T(C): 36.9 (23 Mar 2024 16:05), Max: 37.1 (23 Mar 2024 12:21)  T(F): 98.4 (23 Mar 2024 16:05), Max: 98.8 (23 Mar 2024 12:21)  HR: 89 (23 Mar 2024 16:46) (77 - 105)  BP: 92/50 (23 Mar 2024 16:46) (92/50 - 122/84)  BP(mean): --  ABP: --  ABP(mean): --  RR: 16 (23 Mar 2024 16:05) (14 - 20)  SpO2: 97% (23 Mar 2024 12:21) (97% - 97%)    abdomen: soft, nontender, gravid  back: mild tenderness in R>L flank  extremities: no calf tenderness

## 2024-03-23 NOTE — OB RN PATIENT PROFILE - AS SC BRADEN ACTIVITY
As I have not seen pt for this issue, would recommend in person eval. Would want to r/o other cause of flank pain such as renal stone. Agree with urgent care recommendation as I am working off site tomorrow.  (4) walks frequently

## 2024-03-23 NOTE — OB PROVIDER H&P - PROBLEM SELECTOR PLAN 1
CBC, CMP, UA sent, urine culture  no evidence of active bleeding  NST reactive  Pt administered 4 units Admelog at 13:30, repeat blood sugar at 14:10 was 310  d/w Dr. Shi, Dr. Best, Dr. Brink PGY 4, and Dr. Gomez PGY 3  pt to be admitted for diabetic management, suspected DKA  ante orders  continuous EFM/TOCO  coags, lactate, beta hydroxy buturate, hemoglobin a1c, T&S, RPR, blood culture, VBG ordered  MFM, infectious disease, and dental consult  D/w Dr. Hatfield - 1L NS bolus ordered, renal US, GI consult w/ stool culture, GBS swab and r/o ROM, PICC line swab  prenatal labs sent  urine toxicology  fingersticks premeal and at bedtime  cont Ertapenem IV antibiotic, next dose tomorrow AM  consents obtained
No

## 2024-03-23 NOTE — OB PROVIDER H&P - PROBLEM SELECTOR PLAN 4
CBC, CMP, UA sent, urine culture  no evidence of active bleeding  NST reactive  Pt administered 4 units Admelog at 13:30, repeat blood sugar at 14:10 was 310  d/w Dr. Shi, Dr. Best, Dr. Brink PGY 4, and Dr. Gomez PGY 3  pt to be admitted for diabetic management, suspected DKA  ante orders  continuous EFM/TOCO  coags, lactate, beta hydroxy buturate, hemoglobin a1c, T&S, RPR, blood culture, VBG ordered  MFM, infectious disease, and dental consult  D/w Dr. Hatfield - 1L NS bolus ordered, renal US, GI consult w/ stool culture, GBS swab and r/o ROM, PICC line swab  prenatal labs sent  urine toxicology  fingersticks premeal and at bedtime  cont Ertapenem IV antibiotic, next dose tomorrow AM  consents obtained last six months

## 2024-03-23 NOTE — OB PROVIDER TRIAGE NOTE - NSOBPROVIDERNOTE_OBGYN_ALL_OB_FT
CBC, CMP, UA sent, urine culture  no evidence of active bleeding  NST reactive  Pt administered 4 units Admelog at 13:30, repeat blood sugar at 14:10 was 310  d/w Dr. Shi, Dr. Best, Dr. Brink PGY 4, and Dr. Gomez PGY 3  pt to be admitted for diabetic management, suspected DKA  ante orders  continuous EFM/TOCO  coags, lactate, beta hydroxy buturate, hemoglobin a1c, T&S, RPR, blood culture, VBG ordered  MFM, infectious disease, and dental consult  D/w Dr. Hatfield - 1L NS bolus ordered, monitor fingersticks acc to protocol, renal US, GI consult w/ stool culture, GBS swab and r/o ROM  consents obtained CBC, CMP, UA sent, urine culture  no evidence of active bleeding  NST reactive  Pt administered 4 units Admelog at 13:30, repeat blood sugar at 14:10 was 310  d/w Dr. Shi, Dr. Best, Dr. Brink PGY 4, and Dr. Gomez PGY 3  pt to be admitted for diabetic management, suspected DKA  ante orders  continuous EFM/TOCO  coags, lactate, beta hydroxy buturate, hemoglobin a1c, T&S, RPR, blood culture, VBG ordered  MFM, infectious disease, and dental consult  D/w Dr. Hatfield - 1L NS bolus ordered, renal US, GI consult w/ stool culture, GBS swab and r/o ROM, PICC line swab  prenatal labs sent  urine toxicology  fingersticks premeal and at bedtime  cont Ertapenem IV antibiotic, next dose tomorrow AM  consents obtained

## 2024-03-23 NOTE — OB RN PATIENT PROFILE - HEALTH/HEALTHCARE ANXIETIES, PROFILE
I have personally seen and examined this patient.  I have fully participated in the care of this patient. I have reviewed all pertinent clinical information, including history, physical exam, plan and the Resident’s note and agree except as noted. general anxiety associated with medical issues

## 2024-03-23 NOTE — CONSULT NOTE ADULT - ASSESSMENT
24/F with 24 wk POC, PMH of pancreatic tumor s/p whipple 2021, s/p splenectomy, s/p tonsillectomy 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 She had multiple peridental infections since RCT done a year back. And was started on IV ertapenem 2 weeks back at [facility]. Has been feeling better and tolerating antibiotics well. NO issues with PICC line. Currently presenting for obstetric pain. No report of fevers, chills, ROS other wise negative.      # Periodontal infection  # Osteomyelitis of Jaw    - Would continue ertapenem 1 gm Q24 until planned end date/ further follow up with her provider  - monitor Temperature/ signs of infection  - Check CBC  - Rest of care per OB/GYN      All recommendations are tentative pending Attending Attestation.    Renzo Santos MD, PGY-4  ID Fellow  [employer] Teams Preferred  After 5pm/weekends call [phone]   24/F with 24 wk POC, PMH of pancreatic tumor s/p whipple 2021, s/p splenectomy, s/p tonsillectomy 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 She had multiple periodontal infections since RCT done a year back. And was started on IV ertapenem 2 weeks back at [facility]. Has been feeling better and tolerating antibiotics well. No issues with PICC line. Currently presenting for obstetric pain. No report of fevers, chills, ROS other wise negative.      # Periodontal infection  # Osteomyelitis of Jaw  # s/p splenectomy    - Would continue ertapenem 1 gm Q24 until planned end date/ further follow up with her provider  - monitor Temperature/ signs of infection  - Check CBC  - Please obtain prior records of her treatment if prolonged admission  - Recommend follow up with her PCP regarding post splenectomy immunization  - Rest of care per OB/GYN      Discussed with attending    Renzo Santos MD, PGY-4  ID Fellow  [employer] Teams Preferred  After 5pm/weekends call [phone]

## 2024-03-23 NOTE — OB RN PATIENT PROFILE - FALL HARM RISK - UNIVERSAL INTERVENTIONS
Bed in lowest position, wheels locked, appropriate side rails in place/Call bell, personal items and telephone in reach/Instruct patient to call for assistance before getting out of bed or chair/Non-slip footwear when patient is out of bed/[place] to call system/Physically safe environment - no spills, clutter or unnecessary equipment/Purposeful Proactive Rounding/Room/bathroom lighting operational, light cord in reach

## 2024-03-23 NOTE — OB PROVIDER TRIAGE NOTE - HISTORY OF PRESENT ILLNESS
25 y/o  at 24.1 weeks gestation sent up from ER via EMS w/ service dog c/o abdominal pain with nausea and vomiting for the past week, and pink vaginal discharge since last night. She was seen at [facility] last Friday for these complaints, and ruled out for labor and sent home w/ Robaxin. Pt took 2 pills and d/c'ed as she states it did not help her. Pt then saw private OB following Monday (3/18) and rx'ed her Indomethacin (pharmacy never filled), and omeprazole. 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 Pt states she ate breakfast ate 9a (bagel) and vomited soon after. Pt's blood sugar downstairs in ER was 269. Pt currently has PICC line receiving Ertapenem IV at home x4 weeks for osteomyelitis from an infected root canal approx 1 year ago. Pt also endorses mild intermittent period-like cramping with intermittent diarrhea. Denies LOF. Endorses +FM.    PNC with Linus Yepez; last seen on 3/18  Last ate at 9am  Allergies: PCN (anaphylaxis)  Current medications: PNV, Creon 13338 units 10 capsules TID, Ertapenem through PICC line for last 4 weeks (home health nurse maintains), famotidine 20mg qd, Omeprazole  Medical/surgical history:      -T1DM on insulin pump. 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 In DBT program 3x/week. Feels safe at home; lives with significant other 25 y/o  at 24.1 weeks gestation sent up from ER via EMS w/ service dog c/o abdominal pain with nausea and vomiting for the past week, and pink vaginal discharge since last night. She was seen at [facility] last Friday for these complaints, and ruled out for labor and sent home w/ Robaxin. Pt took 2 pills and d/c'ed as she states it did not help her. Pt then saw private OB following Monday (3/18) and rx'ed her Indomethacin (pharmacy never filled), and omeprazole. 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 Pt states she ate breakfast ate 9a (bagel) and vomited soon after. Pt's blood sugar downstairs in ER was 269. Pt currently has PICC line receiving Ertapenem IV at home x4 weeks for osteomyelitis from an infected root canal approx 1 year ago. Pt also endorses mild intermittent period-like cramping with intermittent diarrhea. Denies LOF. Endorses +FM.    PNC with Linus Yepez; last seen on 3/18  Last ate at 9am  Allergies: PCN (anaphylaxis)  Current medications: PNV, Creon 48909 units 10 capsules TID (last took 5 pills this AM with breakfast sandwich, then vomited after), Ertapenem qd through PICC line for last 4 weeks (last dose today, home health nurse maintains), famotidine 20mg qd, Omeprazole  Medical/surgical history:      -T1DM on insulin pump. 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 In DBT program 3x/week. Feels safe at home; lives with significant other    Endocrine: sees Dr. Head in [place] [phone]  Dental: sees Dr. Nicole Mulligan in [facility] [phone]  OB: Dr. Linus Yepez

## 2024-03-23 NOTE — CONSULT NOTE ADULT - ASSESSMENT
24 y.o.  at 24w1d presented with nausea/emesis/diarrhea, vaginal bleeding    # Nausea/emesis, diabetes on insulin pump  - CMP and BHB without signs of DKA  - has reflux at home, will continue home famotidine/omeprazole  - With history of bowel surgery ordered abdominal xray to evaluate bowel patter  - if progression of symptoms could consider CT sono to evaluate bowel/appendix  - Lactate noted to be elevated but unclear etiology since no clear infectious source; will repeat tonight  - can continue clear diet (bland diet if desired); continued premeal, 1 hour postmeal, and QHS finger sticks    # Chronic diarrhea  - at baseline but maybe increased as per patient  - C diff, stool culture to be sent with next bowel movement  - Contact precautions  - GI to evaluate in AM recommended RUQ and continuation of home PPI    # Tooth infection on multiple weeks of antibiotics  - PICC in situ, culture sent; not to be used for access  - continued invanz as per outside recommendations; records pending  - send COVID/flu to confirm no viral etiology  - ID consulted  - Dental consulted    # Back pain  - minimal on exam  - UA negative for infection, renal sono normal for pregnancy  - sending urine culture to confirm    # Vaginal bleeding  - did not recur  - EFM/toco no signs of  contractions  - Bedside speculum no sign of active bleeding  - bedside ultrasound fetus ~900 grams consistent with gestational age, normal amniotic fluid, 4 extremities noted with heart in chest and brain anatomy appeared normal  - CL completed by NP >2.5 cm   - no indication for  labor management at this time; patient aware if change in status that betamethasone/NICU may be considered but with her diabetes and possible infection that could cause further complications if given    # anemia  - no clear signs of bleeding at this time  - will send iron studies with next lab draw  - if concern for delivery or operative care will need blood on hold    # outside prenatal care  - recommended prenatal records, dental, GI, and any other provider records to be obtained to assist in better evaluation of patient care.    At this time, there is a broad differential of presentation that includes GI vs infectious etiology. There is not concern at this time that presentation is related to obstetric cause. Renal etiology was also evaluated but no clear signs that is cause at this time. Secondary to additional evaluation in progress will continue to follow patient in the hospital at this time.  24 y.o.  at 24w1d presented with nausea/emesis/diarrhea, vaginal bleeding    # Nausea/emesis, diabetes on insulin pump  - CMP and BHB without signs of DKA  - has reflux at home, will continue home famotidine/omeprazole  - With history of bowel surgery ordered abdominal xray to evaluate bowel patter  - if progression of symptoms could consider CT sono to evaluate bowel/appendix  - Lactate noted to be elevated but unclear etiology since no clear infectious source; will repeat tonight  - can continue clear diet (bland diet if desired); continued premeal, 1 hour postmeal, and QHS finger sticks    # Chronic diarrhea  - at baseline but maybe increased as per patient  - C diff, stool culture to be sent with next bowel movement  - Contact precautions  - GI to evaluate in AM recommended RUQ and continuation of home PPI    # Tooth infection on multiple weeks of antibiotics  - PICC in situ, culture sent; not to be used for access  - blood cultures sent since PICC in situ  - continued invanz as per outside recommendations; records pending  - send COVID/flu to confirm no viral etiology  - ID consulted  - Dental consulted    # Back pain  - minimal on exam  - UA negative for infection, renal sono normal for pregnancy  - sending urine culture to confirm    # Vaginal bleeding  - did not recur  - EFM/toco no signs of  contractions  - Bedside speculum no sign of active bleeding  - bedside ultrasound fetus ~900 grams consistent with gestational age, normal amniotic fluid, 4 extremities noted with heart in chest and brain anatomy appeared normal  - CL completed by NP >2.5 cm   - no indication for  labor management at this time; patient aware if change in status that betamethasone/NICU may be considered but with her diabetes and possible infection that could cause further complications if given    # anemia  - no clear signs of bleeding at this time  - will send iron studies with next lab draw  - if concern for delivery or operative care will need blood on hold    # outside prenatal care  - recommended prenatal records, dental, GI, and any other provider records to be obtained to assist in better evaluation of patient care.    At this time, there is a broad differential of presentation that includes GI vs infectious etiology. There is not concern at this time that presentation is related to obstetric cause. Renal etiology was also evaluated but no clear signs that is cause at this time. Secondary to additional evaluation in progress will continue to follow patient in the hospital at this time.

## 2024-03-23 NOTE — OB RN PATIENT PROFILE - NSICDXPASTMEDICALHX_GEN_ALL_CORE_FT
PAST MEDICAL HISTORY:  Acute osteomyelitis     Anxiety     H/O type 1 diabetes mellitus     History of PCOS

## 2024-03-23 NOTE — CONSULT NOTE ADULT - ATTENDING COMMENTS
24 year old female with a history of a pnacreatic tumor s/p whipple and splenectomy and recent diagnosis of odontogenic infection with concern for OM/ extension to sinus presents at 24 weeks for evaluation for n/v with cocnern for DKA      She notes an ongoing issue with a dental infection.  She had a root canal about a yuear ago that was complicated by infection. She was seen by Dr Mulligan in [place] about three months ago.  At that time, purulence was noted at the site of the root canal.  Imaging raised concern for a bone infection.   She was referred to oral surgery at [facility],.   She was given azithromycin and then clindamycin.       Repeat imaging showed progression.  She states she was admitted to [place] about 2 weeks ago and started on ertapenem .  The plan was for 4 weeks of ertapenem.   She states she has 8 days left.   She states she has been tolerating the antibiotics.  She states that her teeth/ face feel a bit better    Overall, patient reports an odontogenic infection with concern for OM    1) Continue ertapenem as prescribed by her outside physicians.  She should continue ertapenem until she can be seen by her prescribing physicians.  This infection does not appear to be related to her current presentation    2) If she remains at [facility], please request full records including her prior imaging and cultures    3) Patient is s/p splenectomy.  She is not aware if she was vaccinated post splenectomy.  She should follow up with her pmd to ensure she was vaccinated for strep pneumonia, H influenza and meningitis post splenectomy and to discuss needed boosters every five years.     Call ID service with additional questions

## 2024-03-23 NOTE — OB PROVIDER H&P - NSHPPHYSICALEXAM_GEN_ALL_CORE
A&O x3  NAD  lungs: clear bilaterally  heart: regular rate and rhythm  abdomen: soft, nontender  back: L CVA tenderness  SSE: cervix closed, no blood visualized in the vault. Neg pooling, neg nitrazine, neg ferning. GBS culture obtained  TVUS: All images saved in [facility]       2.8-3.0cm cervical length, no funneling or dynamic changes  TAUS: All images saved in [facility]       footling breech presentation       posterior placenta       MVP 5.7       +FM       FHR 156bpm    Vital Signs Last 24 Hrs  T(C): 36.7 (23 Mar 2024 13:25), Max: 37.1 (23 Mar 2024 12:21)  T(F): 98.06 (23 Mar 2024 13:25), Max: 98.8 (23 Mar 2024 12:21)  HR: 88 (23 Mar 2024 15:29) (87 - 105)  BP: 113/70 (23 Mar 2024 15:29) (103/53 - 122/84)  BP(mean): --  RR: 14 (23 Mar 2024 13:00) (14 - 20)  SpO2: 97% (23 Mar 2024 12:21) (97% - 97%)

## 2024-03-23 NOTE — OB PROVIDER TRIAGE NOTE - CURRENT PREGNANCY COMPLICATIONS, OB PROFILE
osteomylitis-current abx @ home/picc/Type 1 Diabetes/Maternal Medical Condition/Gestational Age less than 36 Weeks/Maternal Unknown GBS/Other

## 2024-03-23 NOTE — CONSULT NOTE ADULT - SUBJECTIVE AND OBJECTIVE BOX
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 She had multiple peridental infections since RCT done a year back. And was started on IV ertapenem 2 weeks back at [facility]. Has been feeling better and tolerating antibiotics well. NO issues with PICC line. Currently presenting for obstetric pain. No report of fevers, chills, ROS other wise negative.       prior hospital charts reviewed [  ]  primary team notes reviewed [ x ]  other consultant notes reviewed [  ]    PAST MEDICAL & SURGICAL HISTORY:  H/O type 1 diabetes mellitus      History of PCOS      Anxiety      History of Whipple procedure      History of tonsillectomy      H/O dilation and curettage          Allergies  penicillin (Hives)    ANTIMICROBIALS (past 90 days)  MEDICATIONS  (STANDING):          MEDICATIONS  (STANDING):    SOCIAL HISTORY:       FAMILY HISTORY:    REVIEW OF SYSTEMS  [  ] ROS unobtainable because:    [  ] All other systems negative except as noted below:	    Constitutional:  [ ] fever [ ] chills  [ ] weight loss  [ ] weakness  Skin:  [ ] rash [ ] phlebitis	  Eyes: [ ] icterus [ ] pain  [ ] discharge	  ENMT: [ ] sore throat  [ ] thrush [ ] ulcers [ ] exudates  Respiratory: [ ] dyspnea [ ] hemoptysis [ ] cough [ ] sputum	  Cardiovascular:  [ ] chest pain [ ] palpitations [ ] edema	  Gastrointestinal:  [ ] nausea [ ] vomiting [ ] diarrhea [ ] constipation [ ] pain	  Genitourinary:  [ ] dysuria [ ] frequency [ ] hematuria [ ] discharge [ ] flank pain  [ ] incontinence  Musculoskeletal:  [ ] myalgias [ ] arthralgias [ ] arthritis  [ ] back pain  Neurological:  [ ] headache [ ] seizures  [ ] confusion/altered mental status  Psychiatric:  [ ] anxiety [ ] depression	  Hematology/Lymphatics:  [ ] lymphadenopathy  Endocrine:  [ ] adrenal [ ] thyroid  Allergic/Immunologic:	 [ ] transplant [ ] seasonal    Vital Signs Last 24 Hrs  T(F): 98.06 (24 @ 13:25), Max: 98.8 (24 @ 12:21)  Vital Signs Last 24 Hrs  HR: 88 (24 @ 15:29) (87 - 105)  BP: 113/70 (24 @ 15:29) (103/53 - 122/84)  RR: 14 (24 @ 13:00)  SpO2: 97% (24 @ 12:21) (97% - 97%)  Wt(kg): --    PHYSICAL EXAM:  Constitutional: non-toxic, no distress  HEAD/EYES: anicteric, no conjunctival injection  ENT:  supple, no thrush  Cardiovascular:   normal S1, S2, no murmur, no edema  Respiratory:  clear BS bilaterally, no wheezes, no rales  GI:  soft, non-tender, normal bowel sounds  :  no cabrera, no CVA tenderness  Musculoskeletal:  no synovitis, normal ROM  Neurologic: awake and alert, normal strength, no focal findings  Skin:  no rash, no erythema, no phlebitis  Heme/Onc: no lymphadenopathy   Psychiatric:  awake, alert, appropriate mood                            8.7    6.02  )-----------( 223      ( 23 Mar 2024 14:13 )             26.5       131<L>  |  97<L>  |  9   ----------------------------<  308<H>  4.2   |  20<L>  |  0.46<L>    Ca    8.8      23 Mar 2024 14:13    TPro  6.1  /  Alb  3.3  /  TBili  0.4  /  DBili  x   /  AST  29  /  ALT  17  /  AlkPhos  54  -    Urinalysis Basic - ( 23 Mar 2024 14:24 )    Color: Yellow / Appearance: Clear / S.029 / pH: x  Gluc: x / Ketone: Trace mg/dL  / Bili: Negative / Urobili: 0.2 mg/dL   Blood: x / Protein: Negative mg/dL / Nitrite: Negative   Leuk Esterase: Negative / RBC: x / WBC x   Sq Epi: x / Non Sq Epi: x / Bacteria: x    MICROBIOLOGY:              RADIOLOGY:  imaging below personally reviewed and agree with findings Patient is a 24y old  Female who presents with a chief complaint of     24/F with 24 wk POC, PMH of pancreatic tumor s/p whipple , s/p splenectomy, s/p tonsillectomy 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 She had multiple peridental infections since RCT done a year back. And was started on IV ertapenem 2 weeks back at [facility]. Has been feeling better and tolerating antibiotics well. NO issues with PICC line. Currently presenting for obstetric pain. No report of fevers, chills, ROS other wise negative.       prior hospital charts reviewed [  ]  primary team notes reviewed [ x ]  other consultant notes reviewed [  ]    PAST MEDICAL & SURGICAL HISTORY:  H/O type 1 diabetes mellitus      History of PCOS      Anxiety      History of Whipple procedure      History of tonsillectomy      H/O dilation and curettage          Allergies  penicillin (Hives)    ANTIMICROBIALS (past 90 days)  MEDICATIONS  (STANDING):          MEDICATIONS  (STANDING):    SOCIAL HISTORY:  Lives at home with family, no report of tobacco, alchol, IVDU    FAMILY HISTORY: Asthma in mother    REVIEW OF SYSTEMS  [  ] ROS unobtainable because:    [ x ] All other systems negative except as noted below:	    Constitutional:  [ ] fever [ ] chills  [ ] weight loss  [ ] weakness  Skin:  [ ] rash [ ] phlebitis	  Eyes: [ ] icterus [ ] pain  [ ] discharge	  ENMT: [ ] sore throat  [ ] thrush [ ] ulcers [ ] exudates  Respiratory: [ ] dyspnea [ ] hemoptysis [ ] cough [ ] sputum	  Cardiovascular:  [ ] chest pain [ ] palpitations [ ] edema	  Gastrointestinal:  [ ] nausea [ ] vomiting [ ] diarrhea [ ] constipation [ ] pain	  Genitourinary:  [ ] dysuria [ ] frequency [ ] hematuria [ ] discharge [ ] flank pain  [ ] incontinence  Musculoskeletal:  [ ] myalgias [ ] arthralgias [ ] arthritis  [ ] back pain  Neurological:  [ ] headache [ ] seizures  [ ] confusion/altered mental status  Psychiatric:  [ ] anxiety [ ] depression	  Hematology/Lymphatics:  [ ] lymphadenopathy  Endocrine:  [ ] adrenal [ ] thyroid  Allergic/Immunologic:	 [ ] transplant [ ] seasonal    Vital Signs Last 24 Hrs  T(F): 98.06 (24 @ 13:25), Max: 98.8 (24 @ 12:21)  Vital Signs Last 24 Hrs  HR: 88 (24 @ 15:29) (87 - 105)  BP: 113/70 (24 @ 15:29) (103/53 - 122/84)  RR: 14 (24 @ 13:00)  SpO2: 97% (24 @ 12:21) (97% - 97%)  Wt(kg): --    PHYSICAL EXAM:    General: Patient in NAD  HEENT: NCAT, EOMI, PERRL, no oral lesions  CV: S1+S2, no m/r/g appreciated   Lungs: No respiratory distress, CTAB  Abd: Gravid abdomen   : No suprapubic tenderness  Neuro: Alert and oriented to time, place and person. Moves all extremities against gravity.  Ext: No cyanosis, no edema  Skin: No rash, no phlebitis, RUE PICC no signs of infection                              8.7    6.02  )-----------( 223      ( 23 Mar 2024 14:13 )             26.5   03-    131<L>  |  97<L>  |  9   ----------------------------<  308<H>  4.2   |  20<L>  |  0.46<L>    Ca    8.8      23 Mar 2024 14:13    TPro  6.1  /  Alb  3.3  /  TBili  0.4  /  DBili  x   /  AST  29  /  ALT  17  /  AlkPhos  54  03-    Urinalysis Basic - ( 23 Mar 2024 14:24 )    Color: Yellow / Appearance: Clear / S.029 / pH: x  Gluc: x / Ketone: Trace mg/dL  / Bili: Negative / Urobili: 0.2 mg/dL   Blood: x / Protein: Negative mg/dL / Nitrite: Negative   Leuk Esterase: Negative / RBC: x / WBC x   Sq Epi: x / Non Sq Epi: x / Bacteria: x    MICROBIOLOGY:              RADIOLOGY:  imaging below personally reviewed and agree with findings

## 2024-03-23 NOTE — OB PROVIDER H&P - NSLOWPPHRISK_OBGYN_A_OB
No previous uterine incision/Lnua Pregnancy/Less than or equal to 4 previous vaginal births/No known bleeding disorder/No history of postpartum hemorrhage/No other PPH risks indicated

## 2024-03-24 DIAGNOSIS — E10.9 TYPE 1 DIABETES MELLITUS WITHOUT COMPLICATIONS: ICD-10-CM

## 2024-03-24 DIAGNOSIS — E16.2 HYPOGLYCEMIA, UNSPECIFIED: ICD-10-CM

## 2024-03-24 DIAGNOSIS — Z96.41 PRESENCE OF INSULIN PUMP (EXTERNAL) (INTERNAL): ICD-10-CM

## 2024-03-24 LAB
AMPHET UR-MCNC: NEGATIVE — SIGNIFICANT CHANGE UP
BARBITURATES UR SCN-MCNC: NEGATIVE — SIGNIFICANT CHANGE UP
BENZODIAZ UR-MCNC: NEGATIVE — SIGNIFICANT CHANGE UP
COCAINE METAB.OTHER UR-MCNC: NEGATIVE — SIGNIFICANT CHANGE UP
CREATININE URINE RESULT, DAU: 71 MG/DL — SIGNIFICANT CHANGE UP
CULTURE RESULTS: SIGNIFICANT CHANGE UP
GI PCR PANEL: SIGNIFICANT CHANGE UP
HBV SURFACE AG SERPL QL IA: SIGNIFICANT CHANGE UP
METHADONE UR-MCNC: NEGATIVE — SIGNIFICANT CHANGE UP
OPIATES UR-MCNC: NEGATIVE — SIGNIFICANT CHANGE UP
OXYCODONE UR-MCNC: NEGATIVE — SIGNIFICANT CHANGE UP
PCP SPEC-MCNC: SIGNIFICANT CHANGE UP
PCP UR-MCNC: NEGATIVE — SIGNIFICANT CHANGE UP
RUBV IGG SER-ACNC: 1.5 INDEX — SIGNIFICANT CHANGE UP
RUBV IGG SER-IMP: POSITIVE — SIGNIFICANT CHANGE UP
SPECIMEN SOURCE: SIGNIFICANT CHANGE UP
T PALLIDUM AB TITR SER: NEGATIVE — SIGNIFICANT CHANGE UP
THC UR QL: NEGATIVE — SIGNIFICANT CHANGE UP
TRANSFERRIN SERPL-MCNC: 399 MG/DL — HIGH (ref 200–360)

## 2024-03-24 PROCEDURE — 99232 SBSQ HOSP IP/OBS MODERATE 35: CPT

## 2024-03-24 PROCEDURE — 99223 1ST HOSP IP/OBS HIGH 75: CPT

## 2024-03-24 PROCEDURE — 71045 X-RAY EXAM CHEST 1 VIEW: CPT | Mod: 26

## 2024-03-24 PROCEDURE — 99223 1ST HOSP IP/OBS HIGH 75: CPT | Mod: GC

## 2024-03-24 PROCEDURE — 99233 SBSQ HOSP IP/OBS HIGH 50: CPT

## 2024-03-24 RX ORDER — SODIUM CHLORIDE 9 MG/ML
1000 INJECTION, SOLUTION INTRAVENOUS
Refills: 0 | Status: DISCONTINUED | OUTPATIENT
Start: 2024-03-24 | End: 2024-03-25

## 2024-03-24 RX ORDER — POLYETHYLENE GLYCOL 3350 17 G/17G
17 POWDER, FOR SOLUTION ORAL DAILY
Refills: 0 | Status: DISCONTINUED | OUTPATIENT
Start: 2024-03-24 | End: 2024-03-25

## 2024-03-24 RX ORDER — SENNA PLUS 8.6 MG/1
2 TABLET ORAL ONCE
Refills: 0 | Status: COMPLETED | OUTPATIENT
Start: 2024-03-24 | End: 2024-03-24

## 2024-03-24 RX ORDER — FAMOTIDINE 10 MG/ML
1 INJECTION INTRAVENOUS
Refills: 0 | DISCHARGE

## 2024-03-24 RX ORDER — LIPASE/PROTEASE/AMYLASE 16-48-48K
10 CAPSULE,DELAYED RELEASE (ENTERIC COATED) ORAL
Refills: 0 | DISCHARGE

## 2024-03-24 RX ORDER — INSULIN LISPRO 100/ML
1 VIAL (ML) SUBCUTANEOUS
Refills: 0 | Status: DISCONTINUED | OUTPATIENT
Start: 2024-03-24 | End: 2024-03-25

## 2024-03-24 RX ORDER — OMEPRAZOLE 10 MG/1
1 CAPSULE, DELAYED RELEASE ORAL
Refills: 0 | DISCHARGE

## 2024-03-24 RX ORDER — ACETAMINOPHEN 500 MG
1000 TABLET ORAL ONCE
Refills: 0 | Status: COMPLETED | OUTPATIENT
Start: 2024-03-24 | End: 2024-03-24

## 2024-03-24 RX ORDER — INSULIN GLARGINE 100 [IU]/ML
16 INJECTION, SOLUTION SUBCUTANEOUS ONCE
Refills: 0 | Status: DISCONTINUED | OUTPATIENT
Start: 2024-03-24 | End: 2024-03-24

## 2024-03-24 RX ORDER — ERTAPENEM SODIUM 1 G/1
1000 INJECTION, POWDER, LYOPHILIZED, FOR SOLUTION INTRAMUSCULAR; INTRAVENOUS EVERY 24 HOURS
Refills: 0 | Status: DISCONTINUED | OUTPATIENT
Start: 2024-03-25 | End: 2024-03-25

## 2024-03-24 RX ORDER — DEXTROSE 50 % IN WATER 50 %
25 SYRINGE (ML) INTRAVENOUS ONCE
Refills: 0 | Status: COMPLETED | OUTPATIENT
Start: 2024-03-24 | End: 2024-03-24

## 2024-03-24 RX ORDER — SODIUM CHLORIDE 9 MG/ML
1000 INJECTION INTRAMUSCULAR; INTRAVENOUS; SUBCUTANEOUS
Refills: 0 | Status: DISCONTINUED | OUTPATIENT
Start: 2024-03-24 | End: 2024-03-24

## 2024-03-24 RX ORDER — IRON SUCROSE 20 MG/ML
200 INJECTION, SOLUTION INTRAVENOUS ONCE
Refills: 0 | Status: COMPLETED | OUTPATIENT
Start: 2024-03-24 | End: 2024-03-24

## 2024-03-24 RX ORDER — DEXTROSE 50 % IN WATER 50 %
15 SYRINGE (ML) INTRAVENOUS ONCE
Refills: 0 | Status: DISCONTINUED | OUTPATIENT
Start: 2024-03-24 | End: 2024-03-25

## 2024-03-24 RX ORDER — LIPASE/PROTEASE/AMYLASE 16-48-48K
1 CAPSULE,DELAYED RELEASE (ENTERIC COATED) ORAL
Refills: 0 | Status: DISCONTINUED | OUTPATIENT
Start: 2024-03-24 | End: 2024-03-24

## 2024-03-24 RX ORDER — ACETAMINOPHEN 500 MG
1000 TABLET ORAL ONCE
Refills: 0 | Status: DISCONTINUED | OUTPATIENT
Start: 2024-03-24 | End: 2024-03-24

## 2024-03-24 RX ORDER — DEXTROSE 50 % IN WATER 50 %
25 SYRINGE (ML) INTRAVENOUS ONCE
Refills: 0 | Status: COMPLETED | OUTPATIENT
Start: 2024-03-24 | End: 2024-03-23

## 2024-03-24 RX ORDER — FERROUS SULFATE 325(65) MG
325 TABLET ORAL DAILY
Refills: 0 | Status: DISCONTINUED | OUTPATIENT
Start: 2024-03-24 | End: 2024-03-25

## 2024-03-24 RX ORDER — ERTAPENEM SODIUM 1 G/1
INJECTION, POWDER, LYOPHILIZED, FOR SOLUTION INTRAMUSCULAR; INTRAVENOUS
Refills: 0 | Status: DISCONTINUED | OUTPATIENT
Start: 2024-03-24 | End: 2024-03-24

## 2024-03-24 RX ORDER — FAMOTIDINE 10 MG/ML
20 INJECTION INTRAVENOUS AT BEDTIME
Refills: 0 | Status: DISCONTINUED | OUTPATIENT
Start: 2024-03-24 | End: 2024-03-25

## 2024-03-24 RX ORDER — SODIUM CHLORIDE 9 MG/ML
1000 INJECTION, SOLUTION INTRAVENOUS
Refills: 0 | Status: DISCONTINUED | OUTPATIENT
Start: 2024-03-24 | End: 2024-03-24

## 2024-03-24 RX ORDER — LIPASE/PROTEASE/AMYLASE 16-48-48K
10 CAPSULE,DELAYED RELEASE (ENTERIC COATED) ORAL
Refills: 0 | Status: DISCONTINUED | OUTPATIENT
Start: 2024-03-24 | End: 2024-03-25

## 2024-03-24 RX ORDER — HEPARIN SODIUM 5000 [USP'U]/ML
5000 INJECTION INTRAVENOUS; SUBCUTANEOUS EVERY 12 HOURS
Refills: 0 | Status: DISCONTINUED | OUTPATIENT
Start: 2024-03-24 | End: 2024-03-25

## 2024-03-24 RX ORDER — SODIUM CHLORIDE 9 MG/ML
3 INJECTION INTRAMUSCULAR; INTRAVENOUS; SUBCUTANEOUS EVERY 12 HOURS
Refills: 0 | Status: DISCONTINUED | OUTPATIENT
Start: 2024-03-24 | End: 2024-03-25

## 2024-03-24 RX ADMIN — SODIUM CHLORIDE 50 MILLILITER(S): 9 INJECTION, SOLUTION INTRAVENOUS at 07:31

## 2024-03-24 RX ADMIN — IRON SUCROSE 110 MILLIGRAM(S): 20 INJECTION, SOLUTION INTRAVENOUS at 17:41

## 2024-03-24 RX ADMIN — SENNA PLUS 2 TABLET(S): 8.6 TABLET ORAL at 01:35

## 2024-03-24 RX ADMIN — FAMOTIDINE 20 MILLIGRAM(S): 10 INJECTION INTRAVENOUS at 22:52

## 2024-03-24 RX ADMIN — Medication 1 CAPSULE(S): at 00:56

## 2024-03-24 RX ADMIN — POLYETHYLENE GLYCOL 3350 17 GRAM(S): 17 POWDER, FOR SOLUTION ORAL at 16:51

## 2024-03-24 RX ADMIN — Medication 10 CAPSULE(S): at 19:00

## 2024-03-24 RX ADMIN — ERTAPENEM SODIUM 120 MILLIGRAM(S): 1 INJECTION, POWDER, LYOPHILIZED, FOR SOLUTION INTRAMUSCULAR; INTRAVENOUS at 10:06

## 2024-03-24 RX ADMIN — Medication 10 CAPSULE(S): at 06:42

## 2024-03-24 RX ADMIN — Medication 1000 MILLIGRAM(S): at 20:07

## 2024-03-24 RX ADMIN — Medication 10 CAPSULE(S): at 13:50

## 2024-03-24 RX ADMIN — Medication 25 GRAM(S): at 00:21

## 2024-03-24 RX ADMIN — Medication 400 MILLIGRAM(S): at 19:52

## 2024-03-24 RX ADMIN — SODIUM CHLORIDE 50 MILLILITER(S): 9 INJECTION, SOLUTION INTRAVENOUS at 21:36

## 2024-03-24 NOTE — PROVIDER CONTACT NOTE (OTHER) - BACKGROUND
at 24.1 admit abdominal pain with n/v/d transfer from ER via EMS. patient type 3C diabetic functions as type 1 dx  after whipple procedure. patient has own insulin pump and dexcon in place.

## 2024-03-24 NOTE — CONSULT NOTE ADULT - ASSESSMENT
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   GI was consulted for nausea, vomiting and abdominal pain.    Assessment  #Abdominal pain  #Nausea and vomiting  #Chronic diarrhea     24F at 24 weeks gestation, w/ hx of Lynda pinonx 9/2021 for insulinoma, post-surgical DM on insulin pump, presented with nausea and vomiting for the past week, and pink vaginal discharge since night prior to admission.   GI was consulted for nausea, vomiting and abdominal pain.    Assessment  #Abdominal pain  #Nausea and vomiting  #Chronic diarrhea  #Post-surgical DM  #OM on ertapenem  - Lipase wnl; RUQ US unremarkable  - Symptoms appeared to be resolved during the exam  - Described symptoms sounded like partial SBO with spontaneous resolution; No CT 2/2 pregnancy  - Widely fluctuating glycemic control; on insulin pump due to post-surgical DM  - C. diff sample not tested due to stool not watery  - Reported to have diarrhea at baseline, but constipation for days    Recommendations  - Please still send GI PCR; no need to repeat C. diff  - Please send stool elastase and fecal calprotectin (Creon will not alter results)  - Continue Creon with meals and snacks on home dose; patient may guide the dosing per home regimen  - Appreciate endo input for glycemic control  - Diet as tolerated and antiemetic per OB team  - May give pantoprazole 40 mg 30 minutes before breakfast and famotidine 20 mg at bedtime  - Ertapenem per primary team  - Patient should follow up with Dr. Heather Leavitt ([facility] close to patient's home) for outpatient management given complicated GI history    D/w Dr. Yo Kumar  GI/Hepatology Fellow    MONDAY-FRIDAY 8AM-5PM:  Pager# [phone] ([facility]) or [phone] ([facility])    NON-URGENT CONSULTS:  Please email [email or web address] OR [email or web address]  AT NIGHT AND ON WEEKENDS:  Contact on-call GI fellow via answering service ([phone]) from 5pm-8am and on weekends/holidays       24F at 24 weeks gestation, w/ hx of Whipple sx 9/2021 for insulinoma, post-surgical DM on insulin pump, presented with nausea and vomiting for the past week, and pink vaginal discharge since night prior to admission.   GI was consulted for nausea, vomiting and abdominal pain.    Assessment  #Abdominal pain  #Nausea and vomiting  #Chronic diarrhea  #Post-surgical DM  #OM on ertapenem  - Lipase wnl; RUQ US unremarkable  - Symptoms appeared to be resolved during the exam  - Described symptoms sounded like partial SBO with spontaneous resolution; No CT 2/2 pregnancy  - Widely fluctuating glycemic control; on insulin pump due to post-surgical DM  - C. diff sample not tested due to stool not watery  - Reported to have diarrhea at baseline, but constipation for days    Recommendations  - Please still send GI PCR; no need to repeat C. diff  - Please send stool elastase and fecal calprotectin (Creon will not alter results)  - Continue Creon with meals and snacks on home dose; patient may guide the dosing per home regimen  - Appreciate endo input for glycemic control  - Diet as tolerated and antiemetic per OB team  - May give pantoprazole 40 mg 30 minutes before breakfast and famotidine 20 mg at bedtime  - Ertapenem per primary team  - Patient should follow up with Dr. Heather Leavitt ([facility] close to patient's home) for outpatient management given complicated GI history    D/w Dr. Yo Kumar  GI/Hepatology Fellow    MONDAY-FRIDAY 8AM-5PM:  Pager# [phone] ([facility]) or [phone] ([facility])    NON-URGENT CONSULTS:  Please email [email or web address] OR [email or web address]  AT NIGHT AND ON WEEKENDS:  Contact on-call GI fellow via answering service ([phone]) from 5pm-8am and on weekends/holidays

## 2024-03-24 NOTE — CHART NOTE - NSCHARTNOTEFT_GEN_A_CORE
Name: BRETT JAMISON   MRN: [id number]  Location: [facility]    The patient is a 24yFemale, 24 weeks pregnant, with T1DM on insulin pump presenting w/ nausea. Endocrinology consulted for hypoglycemia on insulin pump,    #Type 1 Diabetes Mellitus   - A1C with Estimated Average Glucose Result: 6.6 % (03-23-24)  - on insulin pump, settings unclear, no recent outpt records  - eGFR: 140 mL/min/1.73m2 (03-23-24)  - glucose 50->286 tonight, no evidence of DKA on labs  - patient Weight (kg): 72.6 (03-23-24 @ 16:05)    PLAN:  - would confirm settings on insulin pump, including total daily basal dose  - give 60% of total daily basal dose as a STAT dose of Lantus and shut off insulin pump in 2 hours   - monitor FSG closely tonight (q1-2h) until stable  - Keep NPO if possible until the morning    Full consult to follow in AM    Mingo Kumar MD  Endocrine Fellow  For nonurgent matters, please email [email or web address] or [email or web address]. For urgent follow up questions, discharge recommendations, or new consults please call answering service at [phone] (weekdays), [phone] (nights/weekends). Name: BRETT JAMISON   MRN: [id number]  Location: [facility]    The patient is a 24yFemale, 24 weeks pregnant, with T1DM on insulin pump presenting w/ nausea. Endocrinology consulted for hypoglycemia on insulin pump,    #Type 1 Diabetes Mellitus   - A1C with Estimated Average Glucose Result: 6.6 % (03-23-24)  - on insulin pump, settings unclear, no recent outpt records  - eGFR: 140 mL/min/1.73m2 (03-23-24)  - glucose 50->286 tonight, no evidence of DKA on labs  - patient Weight (kg): 72.6 (03-23-24 @ 16:05)    PLAN:  - would confirm settings on insulin pump, including total daily basal dose  - give 60% of total daily basal dose as a STAT dose of Lantus and shut off insulin pump in 2 hours   - monitor FSG closely tonight (q1-2h) until stable  - Keep NPO if possible until the morning    Please page [phone] with questions. Full consult to follow in AM.     Mingo Kumar MD  Endocrine Fellow  For nonurgent matters, please email [email or web address] or [email or web address]. For urgent follow up questions, discharge recommendations, or new consults please call answering service at [phone] (weekdays), [phone] (nights/weekends).

## 2024-03-24 NOTE — PROVIDER CONTACT NOTE (OTHER) - ACTION/TREATMENT ORDERED:
finger stick at 0840 1 hour later = 189. IV continues to be saline locked. patient to have blood glucose q4h unless otherwise warranted per MD Gomez.

## 2024-03-24 NOTE — PROVIDER CONTACT NOTE (OTHER) - ASSESSMENT
(continued from above) IV PICC line in place receiving INVANZ at home due to chronic infection osteomyletis. whipple procedure patient has no pancreas, gallbladder, duodenum, or parts of the lower intestine. IV 18gauge left forearm placed 3/23 in triage. receiving invanz in the hospital qd. creon 36,000 units TID with meals. NST BID. multiple acute events of hypoglycemia over night received 15g carbs (apple juice) and 25g IVP dextrose x2. patient ate regular meal for breakfast at 0614. patient on d5% at 50cc/hr per MD order due to episodes of hypoglycemia

## 2024-03-24 NOTE — CONSULT NOTE ADULT - ASSESSMENT
The patient is a 24y Female, 24 weeks pregnant, with PMH of insulinoma and nesidioblastosis s/p whipple in 2021, c/b T3c diabetes. Endocrinology consulted for T3c diabetes on insulin pump c/b hypoglycemia.    #Type 3c Diabetes Mellitus (functionally type 1)  #Insulin pump  #Hypoglycemia  - A1C with Estimated Average Glucose Result: 6.6 % (03-23-24)  - home regimen: Omnipod with DEXCOM  Basal:  12a 0.6 -> 0.55  4a 0.9 -> 0.95  8a 1.4 -> 1.45  4p 1.2  ICR:  1:15  ISF 1:65  IOB 4 hrs  target 110-130  - eGFR: 140 mL/min/1.73m2 (03-23-24)  - no evidence of DKA on labs  INPATIENT PLAN:  - The patient wishes to use their insulin pump inpatient, understands how to use it, and has adequate supplies available  - c/w insulin pump, minor changes made as above  - The patient has T1DM and cannot be long without basal insulin. If for any reason the pump becomes dysfunctional or falls off, they should receive 21 units lantus STAT  - The patient needs to fill out the self-assessment form and the patient and primary provider both need to sign the insulin pump attestation form.   - For each meal, the bedside nurse should document the carbohydrate intake and the insulin bolus the patient gives themselves in the EMR flowsheet.   - FSBG before meals and bedtime, but do not order an insulin correction scale as all insulin should be administered by the patient through the pump.   - Use hypoglycemia protocol if needed.  - consistent carb diet when eating  DISCHARGE PLANNING:  - Discharge recs pending clinical course: c/w insulin pump  - followup with own OB and endocrinologist Dr Adilene Kumar MD  Endocrine Fellow  For nonurgent matters (including consults or followup questions), please email [email or web address] or [email or web address]. For urgent matters, please call answering service at [phone] (weekdays), [phone] (nights/weekends).    The patient is a 24y Female, 24 weeks pregnant, with PMH of insulinoma and nesidioblastosis s/p whipple in 2021, c/b T3c diabetes. Endocrinology consulted for T3c diabetes on insulin pump c/b hypoglycemia.    #Type 3c Diabetes Mellitus (functionally type 1)  #Insulin pump  #Hypoglycemia  - A1C with Estimated Average Glucose Result: 6.6 % (03-23-24)  - home regimen: Omnipod with humalog. Uses DEXCOM  Basal:  12a 0.6 -> 0.55  4a 0.9 -> 0.95  8a 1.4 -> 1.45  4p 1.2  ICR:  1:15  ISF 1:65  IOB 4 hrs  target 110-130  Last site change: today  - eGFR: 140 mL/min/1.73m2 (03-23-24)  - no evidence of DKA on labs  INPATIENT PLAN:  - The patient wishes to use their insulin pump inpatient, understands how to use it, and has adequate supplies available  - c/w insulin pump, minor changes made as above  - The patient has T1DM and cannot be long without basal insulin. If for any reason the pump becomes dysfunctional or falls off, they should receive 21 units lantus STAT  - The patient needs to fill out the self-assessment form and the patient and primary provider both need to sign the insulin pump attestation form.   - For each meal, the bedside nurse should document the carbohydrate intake and the insulin bolus the patient gives themselves in the EMR flowsheet.   - FSBG before meals and bedtime, but do not order an insulin correction scale as all insulin should be administered by the patient through the pump.   - Use hypoglycemia protocol if needed.  - consistent carb diet when eating  DISCHARGE PLANNING:  - Discharge recs pending clinical course: c/w insulin pump  - followup with own OB and endocrinologist Dr Adilene Kumar MD  Endocrine Fellow  For nonurgent matters (including consults or followup questions), please email [email or web address] or [email or web address]. For urgent matters, please call answering service at [phone] (weekdays), [phone] (nights/weekends).    The patient is a 24y Female, 24 weeks pregnant, with PMH of insulinoma and nesidioblastosis s/p whipple in 2021, c/b T3c diabetes. Endocrinology consulted for T3c diabetes on insulin pump c/b hypoglycemia.    #Type 3c Diabetes Mellitus (functionally type 1)  #Insulin pump  #Hypoglycemia  - A1C with Estimated Average Glucose Result: 6.6 % (03-23-24)  - home regimen: Omnipod with humalog. Uses DEXCOM  Basal:  12a 0.6 -> 0.55  4a 0.9 -> 0.95  8a 1.4 -> 1.45  4p 1.2  ICR:  1:15  ISF 1:65  IOB 4 hrs  target 110-130  Last site change: today  - eGFR: 140 mL/min/1.73m2 (03-23-24)  - no evidence of DKA on labs  INPATIENT PLAN:  - The patient wishes to use their insulin pump inpatient, understands how to use it, and has adequate supplies available  - c/w insulin pump, minor changes made as above  - The patient has T1DM and cannot be long without basal insulin. If for any reason the pump becomes dysfunctional or falls off, they should receive 21 units lantus STAT  - The patient needs to fill out the self-assessment form and the patient and primary provider both need to sign the insulin pump attestation form.   - For each meal, the bedside nurse should document the carbohydrate intake and the insulin bolus the patient gives themselves in the EMR flowsheet.   - Fingerstick BG 7 times daily, before meals, 1hr or 2hr post meals and bedtime do not order an insulin correction scale as all insulin should be administered by the patient through the pump.   - goal fasting BG <95, 1hr post prandial <140, 2hr post prandial <120  - Use hypoglycemia protocol if needed.  - consistent carb diet when eating  DISCHARGE PLANNING:  - Discharge recs pending clinical course: c/w insulin pump  - followup with own OB and endocrinologist Dr Adilene Kumar MD  Endocrine Fellow  For nonurgent matters (including consults or followup questions), please email [email or web address] or [email or web address]. For urgent matters, please call answering service at [phone] (weekdays), [phone] (nights/weekends).    The patient is a 24y Female, 24 weeks pregnant, with PMH of insulinoma and nesidioblastosis s/p whipple in 2021, c/b T3c diabetes. Endocrinology consulted for T3c diabetes on insulin pump c/b hypoglycemia.    #Type 3c Diabetes Mellitus (functionally type 1)  #Insulin pump  #Hypoglycemia  - A1C with Estimated Average Glucose Result: 6.6 % (03-23-24)  - home regimen: Omnipod5 with humalog. Uses DEXCOM  Basal:  12a 0.6 -> 0.55  4a 0.9 -> 0.95  8a 1.4 -> 1.45  4p 1.2  ICR:  1:15  ISF 1:65  IOB 4 hrs  target 110-130  Last site change: today  - eGFR: 140 mL/min/1.73m2 (03-23-24)  - no evidence of DKA on labs  INPATIENT PLAN:  - The patient wishes to use their insulin pump inpatient, understands how to use it, and has adequate supplies available  - c/w insulin pump, minor changes made as above  - The patient has T1DM and cannot be long without basal insulin. If for any reason the pump becomes dysfunctional or falls off, they should receive 21 units lantus STAT  - The patient needs to fill out the self-assessment form and the patient and primary provider both need to sign the insulin pump attestation form.   - For each meal, the bedside nurse should document the carbohydrate intake and the insulin bolus the patient gives themselves in the EMR flowsheet.   - Fingerstick BG 7 times daily, before meals, 1hr or 2hr post meals and bedtime do not order an insulin correction scale as all insulin should be administered by the patient through the pump.   - goal fasting BG <95, 1hr post prandial <140, 2hr post prandial <120  - Use hypoglycemia protocol if needed.  - consistent carb diet when eating  DISCHARGE PLANNING:  - Discharge recs pending clinical course: c/w insulin pump  - followup with own OB and endocrinologist Dr Adilene Kumar MD  Endocrine Fellow  For nonurgent matters (including consults or followup questions), please email [email or web address] or [email or web address]. For urgent matters, please call answering service at [phone] (weekdays), [phone] (nights/weekends).

## 2024-03-24 NOTE — CONSULT NOTE ADULT - SUBJECTIVE AND OBJECTIVE BOX
HPI:  BRETT JAMISON is a 25 y/o  at 24.1 weeks gestation sent up from ER via EMS w/ service dog c/o abdominal pain with nausea and vomiting for the past week, and pink vaginal discharge since last night. She was seen at [facility] last Friday for these complaints, and ruled out for labor and sent home w/ Robaxin. Pt took 2 pills and d/c'ed as she states it did not help her. Pt then saw private OB following Monday (3/18) and rx'ed her Indomethacin (pharmacy never filled), and omeprazole. 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 Pt states she ate breakfast ate 9a (bagel) and vomited soon after. Pt's blood sugar downstairs in ER was 269. Pt currently has PICC line receiving Ertapenem IV at home x4 weeks for osteomyelitis from an infected root canal approx 1 year ago. Pt also endorses mild intermittent period-like cramping with intermittent diarrhea. Denies LOF. Endorses +FM.    PNC with Linus Yepez; last seen on 3/18  Last ate at 9am  Allergies: PCN (anaphylaxis)  Current medications: PNV, Creon 53091 units 10 capsules TID (last took 5 pills this AM with breakfast sandwich, then vomited after), Ertapenem qd through PICC line for last 4 weeks (last dose today, home health nurse maintains), famotidine 20mg qd, Omeprazole 20mg qd  Medical/surgical history:      -T1DM on insulin pump. 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      PMHX/PSHX:    H/O type 1 diabetes mellitus    History of PCOS    Anxiety    Acute osteomyelitis    History of Whipple procedure    History of tonsillectomy    H/O dilation and curettage      Allergies:  penicillin (Hives)  Augmentin (Anaphylaxis)      Home Medications: reviewed  Hospital Medications:  dextrose 5%. 1000 milliLiter(s) IV Continuous <Continuous>  dextrose 5%. 1000 milliLiter(s) IV Continuous <Continuous>  dextrose 5%. 1000 milliLiter(s) IV Continuous <Continuous>  dextrose 50% Injectable 25 Gram(s) IV Push once  dextrose 50% Injectable 12.5 Gram(s) IV Push once  dextrose 50% Injectable 25 Gram(s) IV Push once  dextrose Oral Gel 15 Gram(s) Oral once PRN  ertapenem  IVPB      ertapenem  IVPB 1000 milliGRAM(s) IV Intermittent once  ertapenem  IVPB 1000 milliGRAM(s) IV Intermittent every 24 hours  ferrous    sulfate 325 milliGRAM(s) Oral daily  folic acid 1 milliGRAM(s) Oral daily  glucagon  Injectable 1 milliGRAM(s) IntraMuscular once  pancrelipase  (CREON 36,000 Lipase Units) 10 Capsule(s) Oral three times a day with meals  pantoprazole    Tablet 40 milliGRAM(s) Oral before breakfast  prenatal multivitamin 1 Tablet(s) Oral daily  senna 2 Tablet(s) Oral at bedtime      Social History:   Tobacco: denies  Alcohol: denies  Recreational drugs: denies    Family history:      Denies family history of colon cancer/polyps, stomach cancer/polyps, pancreatic cancer/masses, liver cancer/disease, ovarian cancer and endometrial cancer.    PHYSICAL EXAM:   Vital Signs:  Vital Signs Last 24 Hrs  T(C): 36.8 (23 Mar 2024 22:21), Max: 37.1 (23 Mar 2024 12:21)  T(F): 98.2 (23 Mar 2024 22:21), Max: 98.8 (23 Mar 2024 12:21)  HR: 82 (24 Mar 2024 06:12) (77 - 126)  BP: 112/58 (24 Mar 2024 06:12) (92/50 - 126/73)  BP(mean): --  RR: 16 (24 Mar 2024 00:28) (14 - 20)  SpO2: 97% (23 Mar 2024 12:21) (97% - 97%)      Daily Height in cm: 165.1 (23 Mar 2024 16:05)    Daily Weight Pre-pregnancy in k (23 Mar 2024 16:05)    GENERAL: no acute distress  NEURO: alert and oriented x 3  HEENT: NCAT, no conjunctival pallor appreciated; anicteric sclera  CHEST: no respiratory distress, no accessory muscle use  CARDIAC: regular rate, +S1/S2  ABDOMEN: soft, nontender, no rebound or guarding; rectal exam performed with ___: Normal rectal tone and coordination; No hard stool or blood in the rectum; Brown stool smear on gloved finger.   EXTREMITIES: warm, well perfused  SKIN: no lesions noted    LABS: reviewed                        8.4    5.49  )-----------( 210      ( 23 Mar 2024 22:16 )             25.3     03-23    136  |  102  |  10  ----------------------------<  134<H>  3.7   |  23  |  0.41<L>    Ca    8.6      23 Mar 2024 22:16    TPro  5.9<L>  /  Alb  3.3  /  TBili  0.3  /  DBili  x   /  AST  24  /  ALT  16  /  AlkPhos  52      LIVER FUNCTIONS - ( 23 Mar 2024 22:16 )  Alb: 3.3 g/dL / Pro: 5.9 g/dL / ALK PHOS: 52 U/L / ALT: 16 U/L / AST: 24 U/L / GGT: x                    HPI:  BRETT JAMISON is a 25 y/o  at 24.1 weeks gestation sent up from ER via EMS w/ service dog c/o abdominal pain with nausea and vomiting for the past week, and pink vaginal discharge since last night. She was seen at [facility] last Friday for these complaints, and ruled out for labor and sent home w/ Robaxin. Pt took 2 pills and d/c'ed as she states it did not help her. Pt then saw private OB following Monday (3/18) and rx'ed her Indomethacin (pharmacy never filled), and omeprazole. 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 Pt states she ate breakfast ate 9a (bagel) and vomited soon after. Pt's blood sugar downstairs in ER was 269. Pt currently has PICC line receiving Ertapenem IV at home x4 weeks for osteomyelitis from an infected root canal approx 1 year ago. Pt also endorses mild intermittent period-like cramping with intermittent diarrhea. Denies LOF. Endorses +FM.    PNC with Linus Yepez; last seen on 3/18  Last ate at 9am  Allergies: PCN (anaphylaxis)  Current medications: PNV, Creon 66929 units 10 capsules TID (last took 5 pills this AM with breakfast sandwich, then vomited after), Ertapenem qd through PICC line for last 4 weeks (last dose today, home health nurse maintains), famotidine 20mg qd, Omeprazole 20mg qd  Medical/surgical history:      -Surgery-induced DM on insulin pump. 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 She has long-standing gastroparesis since the Wipple surgery, and was on TPN for a long time. Repeat GES showed improved gastroparesis and clinically also with improved symptoms, currently able to tolerate PO at baseline. May get severe epigastric pain if eating greasy foods or eating too quickly. She also has intermittent LLQ pain that was not associated with PO intake. Pain may come and go and could be very severe at times. Patient was told she had a lot of scar tissues post surgery, and the LLQ pain was generally directly underneath a surgical scar. It felt like the bowel was twisted, and will spontaneously resolve in a variable time frame. May experience nausea/vomiting when having this pain, which was similar to the pain she presented with.   Patient takes Creon with meals, but still has 10+ watery, greasy BM's daily. Creon improves the stool consistency, but not sufficiently managing even with high dose (10 capsules at a time). Had cholecystectomy with Wipple, not on cholestyramine. She also has longstanding GERD, taking omeprazole and famotidine daily at home.     Per RN, while inpatient, patient's abdominal pain, N/V appeared to be relating to her hypoglycemic episodes.           PMHX/PSHX:    H/O type 1 diabetes mellitus    History of PCOS    Anxiety    Acute osteomyelitis    History of Whipple procedure    History of tonsillectomy    H/O dilation and curettage      Allergies:  penicillin (Hives)  Augmentin (Anaphylaxis)      Home Medications: reviewed  Hospital Medications:  dextrose 5%. 1000 milliLiter(s) IV Continuous <Continuous>  dextrose 5%. 1000 milliLiter(s) IV Continuous <Continuous>  dextrose 5%. 1000 milliLiter(s) IV Continuous <Continuous>  dextrose 50% Injectable 25 Gram(s) IV Push once  dextrose 50% Injectable 12.5 Gram(s) IV Push once  dextrose 50% Injectable 25 Gram(s) IV Push once  dextrose Oral Gel 15 Gram(s) Oral once PRN  ertapenem  IVPB      ertapenem  IVPB 1000 milliGRAM(s) IV Intermittent once  ertapenem  IVPB 1000 milliGRAM(s) IV Intermittent every 24 hours  ferrous    sulfate 325 milliGRAM(s) Oral daily  folic acid 1 milliGRAM(s) Oral daily  glucagon  Injectable 1 milliGRAM(s) IntraMuscular once  pancrelipase  (CREON 36,000 Lipase Units) 10 Capsule(s) Oral three times a day with meals  pantoprazole    Tablet 40 milliGRAM(s) Oral before breakfast  prenatal multivitamin 1 Tablet(s) Oral daily  senna 2 Tablet(s) Oral at bedtime      PHYSICAL EXAM:   Vital Signs:  Vital Signs Last 24 Hrs  T(C): 36.8 (23 Mar 2024 22:21), Max: 37.1 (23 Mar 2024 12:21)  T(F): 98.2 (23 Mar 2024 22:21), Max: 98.8 (23 Mar 2024 12:21)  HR: 82 (24 Mar 2024 06:12) (77 - 126)  BP: 112/58 (24 Mar 2024 06:12) (92/50 - 126/73)  BP(mean): --  RR: 16 (24 Mar 2024 00:28) (14 - 20)  SpO2: 97% (23 Mar 2024 12:21) (97% - 97%)      Daily Height in cm: 165.1 (23 Mar 2024 16:05)    Daily Weight Pre-pregnancy in k (23 Mar 2024 16:05)    GENERAL: no acute distress during the exam  NEURO: alert and oriented x 3  HEENT: NCAT, anicteric sclera  CHEST: no respiratory distress, no accessory muscle use  CARDIAC: regular rate, +S1/S2  ABDOMEN: Gravid uterus; epigastric tenderness; LLQ tenderness under surgical scar, no rebound or guarding  EXTREMITIES: warm, well perfused  SKIN: no lesions noted    LABS: reviewed                        8.4    5.49  )-----------( 210      ( 23 Mar 2024 22:16 )             25.3     03-23    136  |  102  |  10  ----------------------------<  134<H>  3.7   |  23  |  0.41<L>    Ca    8.6      23 Mar 2024 22:16    TPro  5.9<L>  /  Alb  3.3  /  TBili  0.3  /  DBili  x   /  AST  24  /  ALT  16  /  AlkPhos  52  03-23    LIVER FUNCTIONS - ( 23 Mar 2024 22:16 )  Alb: 3.3 g/dL / Pro: 5.9 g/dL / ALK PHOS: 52 U/L / ALT: 16 U/L / AST: 24 U/L / GGT: x               < from: US Abdomen Upper Quadrant Right (24 @ 21:10) >    FINDINGS:  Liver: Within normal limits.  Bile ducts: Normal caliber. Common bile duct measures 5 mm.  Gallbladder: Cholecystectomy.  Pancreas: Postsurgical changes of whipple procedure. Visualized portions   are within normal limits.  Right kidney: 11.6 cm. Mild hydronephrosis.  Ascites: None.  IVC: Visualized portions are within normal limits.    IMPRESSION:  Mild right hydronephrosis.        < end of copied text >       HPI:  BRETT JAMISON is a 23 y/o  at 24.1 weeks gestation sent up from ER via EMS w/ service dog c/o abdominal pain with nausea and vomiting for the past week, and pink vaginal discharge since last night. She was seen at [facility] last Friday for these complaints, and ruled out for labor and sent home w/ Robaxin. Pt took 2 pills and d/c'ed as she states it did not help her. Pt then saw private OB following Monday (3/18) and rx'ed her Indomethacin (pharmacy never filled), and omeprazole. 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 Pt states she ate breakfast ate 9a (bagel) and vomited soon after. Pt's blood sugar downstairs in ER was 269. Pt currently has PICC line receiving Ertapenem IV at home x4 weeks for osteomyelitis from an infected root canal approx 1 year ago. Pt also endorses mild intermittent period-like cramping with intermittent diarrhea. Denies LOF. Endorses +FM.    PNC with Linus Yepez; last seen on 3/18  Last ate at 9am  Allergies: PCN (anaphylaxis)  Current medications: PNV, Creon 81475 units 10 capsules TID (last took 5 pills this AM with breakfast sandwich, then vomited after), Ertapenem qd through PICC line for last 4 weeks (last dose today, home health nurse maintains), famotidine 20mg qd, Omeprazole 20mg qd  Medical/surgical history:      -Surgery-induced DM on insulin pump. 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 She has long-standing gastroparesis since the Whipple surgery, and was on TPN for a long time. Repeat GES showed improved gastroparesis and clinically also with improved symptoms, currently able to tolerate PO at baseline. May get severe epigastric pain if eating greasy foods or eating too quickly. She also has intermittent LLQ pain that was not associated with PO intake. Pain may come and go and could be very severe at times. Patient was told she had a lot of scar tissues post surgery, and the LLQ pain was generally directly underneath a surgical scar. It felt like the bowel was twisted, and will spontaneously resolve in a variable time frame. May experience nausea/vomiting when having this pain, which was similar to the pain she presented with.   Patient takes Creon with meals, but still has 10+ watery, greasy BM's daily. Creon improves the stool consistency, but not sufficiently managing even with high dose (10 capsules at a time). Had cholecystectomy with Wipple, not on cholestyramine. She also has longstanding GERD, taking omeprazole and famotidine daily at home.     Per RN, while inpatient, patient's abdominal pain, N/V appeared to be relating to her hypoglycemic episodes.           PMHX/PSHX:    H/O type 1 diabetes mellitus    History of PCOS    Anxiety    Acute osteomyelitis    History of Whipple procedure    History of tonsillectomy    H/O dilation and curettage      Allergies:  penicillin (Hives)  Augmentin (Anaphylaxis)      Home Medications: reviewed  Hospital Medications:  dextrose 5%. 1000 milliLiter(s) IV Continuous <Continuous>  dextrose 5%. 1000 milliLiter(s) IV Continuous <Continuous>  dextrose 5%. 1000 milliLiter(s) IV Continuous <Continuous>  dextrose 50% Injectable 25 Gram(s) IV Push once  dextrose 50% Injectable 12.5 Gram(s) IV Push once  dextrose 50% Injectable 25 Gram(s) IV Push once  dextrose Oral Gel 15 Gram(s) Oral once PRN  ertapenem  IVPB      ertapenem  IVPB 1000 milliGRAM(s) IV Intermittent once  ertapenem  IVPB 1000 milliGRAM(s) IV Intermittent every 24 hours  ferrous    sulfate 325 milliGRAM(s) Oral daily  folic acid 1 milliGRAM(s) Oral daily  glucagon  Injectable 1 milliGRAM(s) IntraMuscular once  pancrelipase  (CREON 36,000 Lipase Units) 10 Capsule(s) Oral three times a day with meals  pantoprazole    Tablet 40 milliGRAM(s) Oral before breakfast  prenatal multivitamin 1 Tablet(s) Oral daily  senna 2 Tablet(s) Oral at bedtime      PHYSICAL EXAM:   Vital Signs:  Vital Signs Last 24 Hrs  T(C): 36.8 (23 Mar 2024 22:21), Max: 37.1 (23 Mar 2024 12:21)  T(F): 98.2 (23 Mar 2024 22:21), Max: 98.8 (23 Mar 2024 12:21)  HR: 82 (24 Mar 2024 06:12) (77 - 126)  BP: 112/58 (24 Mar 2024 06:12) (92/50 - 126/73)  BP(mean): --  RR: 16 (24 Mar 2024 00:28) (14 - 20)  SpO2: 97% (23 Mar 2024 12:21) (97% - 97%)      Daily Height in cm: 165.1 (23 Mar 2024 16:05)    Daily Weight Pre-pregnancy in k (23 Mar 2024 16:05)    GENERAL: no acute distress during the exam  NEURO: alert and oriented x 3  HEENT: NCAT, anicteric sclera  CHEST: no respiratory distress, no accessory muscle use  CARDIAC: regular rate, +S1/S2  ABDOMEN: Gravid uterus; epigastric tenderness; LLQ tenderness under surgical scar, no rebound or guarding  EXTREMITIES: warm, well perfused  SKIN: no lesions noted    LABS: reviewed                        8.4    5.49  )-----------( 210      ( 23 Mar 2024 22:16 )             25.3     03-23    136  |  102  |  10  ----------------------------<  134<H>  3.7   |  23  |  0.41<L>    Ca    8.6      23 Mar 2024 22:16    TPro  5.9<L>  /  Alb  3.3  /  TBili  0.3  /  DBili  x   /  AST  24  /  ALT  16  /  AlkPhos  52  03-23    LIVER FUNCTIONS - ( 23 Mar 2024 22:16 )  Alb: 3.3 g/dL / Pro: 5.9 g/dL / ALK PHOS: 52 U/L / ALT: 16 U/L / AST: 24 U/L / GGT: x               < from: US Abdomen Upper Quadrant Right (24 @ 21:10) >    FINDINGS:  Liver: Within normal limits.  Bile ducts: Normal caliber. Common bile duct measures 5 mm.  Gallbladder: Cholecystectomy.  Pancreas: Postsurgical changes of whipple procedure. Visualized portions   are within normal limits.  Right kidney: 11.6 cm. Mild hydronephrosis.  Ascites: None.  IVC: Visualized portions are within normal limits.    IMPRESSION:  Mild right hydronephrosis.        < end of copied text >

## 2024-03-24 NOTE — PROVIDER CONTACT NOTE (HYPOGLYCEMIA EVENT) - NS PROVIDER CONTACT BACKGROUND-HYPO
Age: 24y    Gender: Female    POCT Blood Glucose:221 (03-23-24 @ 23:39)  32 (03-23-24 @ 23:29)  51 (03-23-24 @ 23:21)    88 mg/dL (03-24-24 @ 22:19)  79 mg/dL (03-24-24 @ 21:44)  65 mg/dL (03-24-24 @ 21:42)  64 mg/dL (03-24-24 @ 21:23)  63 mg/dL (03-24-24 @ 21:21)  56 mg/dL (03-24-24 @ 20:53)  58 mg/dL (03-24-24 @ 20:52)  49 mg/dL (03-24-24 @ 20:36)      eMAR:  dextrose 50% Injectable   25 Gram(s) IV Push (03-23-24 @ 23:35)    dextrose 50% Injectable   25 Gram(s) IV Push (03-24-24 @ 00:21)

## 2024-03-24 NOTE — CONSULT NOTE ADULT - ATTENDING COMMENTS
chart, meds, labs, imaging, blood glucose noted   T3C DM, s/p Kishore 2021.   she is currently 24 weeks pregnant.   okay to c/w the pump, but confirm low BGs with fingersticks.   rest of the plan as per fellow's note.   complex case requiring complex decision  making chart, meds, labs, imaging, blood glucose noted.   she is currently 24 weeks pregnant.   T3C DM, s/p Whipple 2021 with labile BGs.    She has hypoglycemic awareness when BG is less than 30.    low BG last night was due to no carb intake as per the pt.    BG goal for pregnancy discussed wit the pt, however, she would like her BG to be around 150-160.     She is currently on Omnipod 5 for almost a year and it is no manual mode.  okay to c/w the pump and adjustment as per fellow's note.   d/w that if we are not targeting for pregnancy level BGs, then she should consider going into automode instead. She will d/w with her MFM team.    rest of the plan as per fellow's note.   complex case requiring complex decision  making

## 2024-03-24 NOTE — PROGRESS NOTE ADULT - NSPROGADDITIONALINFOA_GEN_ALL_CORE
MFM FELLOW ADDENDUM:    24y  at 24w2d with history notable for insulinoma s/p whipple in  and subsequent development of T1DM, admitted for abdominal pain, N/V.  Pain is intermittent/episodic and severe, admitted for further workup in the setting of surgical history. Patient also initially presented with vaginal spotting, no further bleeding or cramping fetal status reassuring.     Abd pain: RUQ sono, renal sono, and upright KUB without acute findings attributing to abdominal pain, low concern for obstruction at this time.  Appreciate GI recommendations in the setting of medical/surgical history.  Likely constipation exacerbating factor, started in Miralax today (although will watch symptoms as she has chronic diarrhea).  Follow up GI culture/PCR data.  Continue Creon with meals and continue PPI; regular diet as tolerated. Tylenol PRN. If patient with severe episode of abdominal pain, stat consult for GI evaluation.  Plan for repeat labs tomorrow AM.      T1DM: Episodes of hypoglycemia overnight with resolution with resumption of diet; otherwise runs high (high 100s-200s).  She is s/p endocrine consult today, appreciate recommendations with pump adjustments decreasing basal dosing nighttime.  No evidence of DKA as cause of pain/GI symptoms.      Osteomyelitis (tooth infection): S/p dental and ID consults, appreciate recs.  Continuing home ertapenem through peripheral IV; continue PICC care while inpatient.  Elevated lactate on admission, possibly from current longstanding infection; lower concern for intraabdominal infection as cause of abdominal pain.  Follow up blood cultures.     Chronic anemia: Iron deficiency - labs consistent; discussed IV iron to receive today.      Fetal: Fetal status has been reassuring on NST.  No indication for betamethasone at this time; however, consider betamethasone/magnesium/NICU consult if patient's clinical status worsens or concern for  delivery.  No indication for delivery at this time.  NST BID while in house.       Seen with KRYSTLE Wild attending  Leann Martin MD PGY-5 MFM fellow

## 2024-03-24 NOTE — CONSULT NOTE ADULT - ATTENDING COMMENTS
Pregnant 24F in second trimester, PMHx Whipple (for insulinoma, 2021) c/b brittle diabetes and gastroparesis, SBO s/p bowel resection who presents with nausea, vomiting, abdominal pain and diarrhea.    RUQUS wnl, GI PCR negative, CMP wnl, lipase normal, CBC with normocytic anemia to 8s. Denies overt bleeding, notes she is more likely to have abdominal pain and vomiting when she eats too fast (gives having 2 slices of pizza as an example), also with cramping abdominal pain when sugars are low. Diarrhea is longstanding since surgery.    Suspect nausea, vomiting and diarrhea are multifactorial. Nausea, vomiting, abdominal pain have now improved, may be 2/2 hypoglycemia and altered gastric motility and possible intermittent partial SBO 2/2 adhesions. GERD may be contributing to abdominal pain. Diarrhea likely due to EPI and some element of bile acid diarrhea given CCY and previous small bowel resection.     Continue Creon, daily PPI, Pepcid qHS, small frequent low fat meals, blood glucose management for endocrine, should have outpatient GI followup for consideration of cholestyramine (recommended Namita Leavitt who is in her area).

## 2024-03-24 NOTE — PROGRESS NOTE ADULT - ASSESSMENT
24 year old female with a history of a pnacreatic tumor s/p whipple and splenectomy and recent diagnosis of odontogenic infection with concern for OM/ extension to sinus presents at 24 weeks for evaluation for n/v with cocnern for DKA    She notes an ongoing issue with a dental infection.  She had a root canal about a yuear ago that was complicated by infection. She was seen by Dr Mulligan in [place] about three months ago.  At that time, purulence was noted at the site of the root canal.  Imaging raised concern for a bone infection.   She was referred to oral surgery at [facility],.   She was given azithromycin and then clindamycin.       Repeat imaging showed progression.  She states she was admitted to [place] about 2 weeks ago and started on ertapenem .  The plan was for 4 weeks of ertapenem.   She states she has 8 days left.   She states she has been tolerating the antibiotics.  She states that her teeth/ face feel a bit better    Overall, patient reports an odontogenic infection with concern for OM    1) Continue ertapenem as prescribed by her outside physicians.  She should continue ertapenem until she can be seen by her prescribing physicians.  This infection does not appear to be related to her current presentation. Gi evaluating her symptoms    2) If she remains at [facility], please request full records including her prior imaging and cultures    3) Patient is s/p splenectomy.  She is not aware if she was vaccinated post splenectomy.  She should follow up with her pmd to ensure she was vaccinated for strep pneumonia, H influenza and meningitis post splenectomy and to discuss needed boosters every five years.     4) Cultures were sent from port of picc lines.  Swabs were taken of ports.   No clinical suspicion of picc infection.  If these cultures are positive, they likely represent colonization and I would not consider this evidence of infection.  Follow up peripheral blood cultures    She has a follow up appt with ID at [facility]    Discussed with MFM and gyn  Call ID service with additional questions

## 2024-03-24 NOTE — CONSULT NOTE ADULT - SUBJECTIVE AND OBJECTIVE BOX
HPI:  23 y/o  at 24.1 weeks gestation sent up from ER via EMS w/ service dog c/o abdominal pain with nausea and vomiting for the past week, and pink vaginal discharge since last night. She was seen at [facility] last Friday for these complaints, and ruled out for labor and sent home w/ Robaxin. Pt took 2 pills and d/c'ed as she states it did not help her. Pt then saw private OB following Monday (3/18) and rx'ed her Indomethacin (pharmacy never filled), and omeprazole. 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 Pt states she ate breakfast ate 9a (bagel) and vomited soon after. Pt's blood sugar downstairs in ER was 269. Pt currently has PICC line receiving Ertapenem IV at home x4 weeks for osteomyelitis from an infected root canal approx 1 year ago. Pt also endorses mild intermittent period-like cramping with intermittent diarrhea. Denies LOF. Endorses +FM.    PNC with Linus Yepez; last seen on 3/18  Last ate at 9am  Allergies: PCN (anaphylaxis)  Current medications: PNV, Creon 07981 units 10 capsules TID (last took 5 pills this AM with breakfast sandwich, then vomited after), Ertapenem qd through PICC line for last 4 weeks (last dose today, home health nurse maintains), famotidine 20mg qd, Omeprazole 20mg qd  Medical/surgical history:      -T1DM on insulin pump. 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 In DBT program 3x/week. Feels safe at home; lives with significant other    Endocrine: sees Dr. Head in [place] [phone]  D[place]: [place]. [place] [facility] [phone]  OB: Dr. 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 During pregnancy has been managed by her OB at [place]  Last hemoglobin A1c: 6.6%  Home regimen: Omnipod 5 w/ DEXCOM  Basal:  12a 0.6  4a 0.9  8a 1.4  4p 1.2  ICR:  1:15  ISF 1:65  IOB 4 hrs  target 110-130  Blood sugar: has DEXCOM, data for last 7 days reviewed:  Average glucose 63%; 12% very high, 31% high, 52% in range, 3% low, 2% very low; Trend w/ hypoglycemia 12am-4am, high from 4am-4pm  Hypoglycemia episodes: frequently has hypoglycemia into the 30-50s. Has hypoglycemia unawareness. Has a service dog (Danish Moore) that stays with her at all times to alert her.  Microvascular complications: Patient denies hx nephropathy, retinopathy, or neuropathy.  Macrovascular complications: No history of CAD or CVA.    Patient with hypoglycemia to 50s last night (reportedly to 30s on DEXCOM). Now improved s/p suspension of pump and D5.       PAST MEDICAL & SURGICAL HISTORY:  H/O type 1 diabetes mellitus      History of PCOS      Anxiety      Acute osteomyelitis      History of Whipple procedure      History of tonsillectomy      H/O dilation and curettage          FAMILY HISTORY:      Social History:    Outpatient Medications: Omnipod 5    MEDICATIONS  (STANDING):  dextrose 5%. 1000 milliLiter(s) (100 mL/Hr) IV Continuous <Continuous>  dextrose 5%. 1000 milliLiter(s) (50 mL/Hr) IV Continuous <Continuous>  dextrose 5%. 1000 milliLiter(s) (50 mL/Hr) IV Continuous <Continuous>  dextrose 50% Injectable 25 Gram(s) IV Push once  dextrose 50% Injectable 12.5 Gram(s) IV Push once  dextrose 50% Injectable 25 Gram(s) IV Push once  ertapenem  IVPB      ferrous    sulfate 325 milliGRAM(s) Oral daily  folic acid 1 milliGRAM(s) Oral daily  glucagon  Injectable 1 milliGRAM(s) IntraMuscular once  heparin   Injectable 5000 Unit(s) SubCutaneous every 12 hours  pancrelipase  (CREON 36,000 Lipase Units) 10 Capsule(s) Oral three times a day with meals  pantoprazole    Tablet 40 milliGRAM(s) Oral before breakfast  polyethylene glycol 3350 17 Gram(s) Oral daily  prenatal multivitamin 1 Tablet(s) Oral daily  senna 2 Tablet(s) Oral at bedtime    MEDICATIONS  (PRN):  dextrose Oral Gel 15 Gram(s) Oral once PRN Blood Glucose LESS THAN 70 milliGRAM(s)/deciliter      Allergies    penicillin (Hives)  Augmentin (Anaphylaxis)    Intolerances      Review of Systems:  Constitutional: No fever  Eyes: No blurry vision  Neuro: No tremors  HEENT: No pain  Cardiovascular: No chest pain, palpitations  Respiratory: No SOB, no cough  GI: + nausea, no vomiting, + abdominal pain  : No dysuria  Skin: no rash  Psych: no depression  Endocrine: no polyuria, polydipsia  Hem/lymph: no swelling  Osteoporosis: no fractures    ALL OTHER SYSTEMS REVIEWED AND NEGATIVE    PHYSICAL EXAM:  VITALS: T(C): 36.7 (24 @ 07:43)  T(F): 98.06 (24 @ 07:43), Max: 98.8 (24 @ 12:21)  HR: 90 (24 @ 07:43) (77 - 126)  BP: 116/71 (24 @ 07:43) (92/50 - 126/73)  RR:  (14 - 20)  SpO2:  (97% - 97%)  Wt(kg): --  GENERAL: NAD, well-groomed, well-developed  EYES: No proptosis, no lid lag, anicteric  HEENT:  Atraumatic, Normocephalic, moist mucous membranes  RESPIRATORY: Normal respiratory effort; no audible wheezing  SKIN: Dry, intact, No rashes or lesions  MUSCULOSKELETAL: Full range of motion, normal strength  NEURO: sensation intact, extraocular movements intact, no tremor  PSYCH: Alert and oriented x 3, normal affect, normal mood  CUSHING'S SIGNS: no striae                          8.4    5.49  )-----------( 210      ( 23 Mar 2024 22:16 )             25.3           136  |  102  |  10  ----------------------------<  134<H>  3.7   |  23  |  0.41<L>    eGFR: 140    Ca    8.6          TPro  5.9<L>  /  Alb  3.3  /  TBili  0.3  /  DBili  x   /  AST  24  /  ALT  16  /  AlkPhos  52        A1C with Estimated Average Glucose Result: 6.6 % (24 @ 15:15)      CAPILLARY BLOOD GLUCOSE  221 (23 Mar 2024 23:39)  32 (23 Mar 2024 23:29)  51 (23 Mar 2024 23:21)      POCT Blood Glucose.: 189 mg/dL (24 Mar 2024 08:41)  POCT Blood Glucose.: 220 mg/dL (24 Mar 2024 07:42)  POCT Blood Glucose.: 170 mg/dL (24 Mar 2024 06:14)  POCT Blood Glucose.: 108 mg/dL (24 Mar 2024 04:17)  POCT Blood Glucose.: 99 mg/dL (24 Mar 2024 03:08)  POCT Blood Glucose.: 72 mg/dL (24 Mar 2024 02:39)  POCT Blood Glucose.: 61 mg/dL (24 Mar 2024 02:28)  POCT Blood Glucose.: 79 mg/dL (24 Mar 2024 01:39)  POCT Blood Glucose.: 72 mg/dL (24 Mar 2024 01:08)  POCT Blood Glucose.: 50 mg/dL (24 Mar 2024 00:53)  POCT Blood Glucose.: 185 mg/dL (24 Mar 2024 00:26)  POCT Blood Glucose.: 51 mg/dL (24 Mar 2024 00:19)  POCT Blood Glucose.: 286 mg/dL (23 Mar 2024 23:37)  POCT Blood Glucose.: 51 mg/dL (23 Mar 2024 23:21)  POCT Blood Glucose.: 140 mg/dL (23 Mar 2024 21:59)  POCT Blood Glucose.: 127 mg/dL (23 Mar 2024 19:38)  POCT Blood Glucose.: 156 mg/dL (23 Mar 2024 18:09)  POCT Blood Glucose.: 194 mg/dL (23 Mar 2024 16:57)  POCT Blood Glucose.: 310 mg/dL (23 Mar 2024 14:12)  POCT Blood Glucose.: 269 mg/dL (23 Mar 2024 12:17)      Thyroid Function Tests:  dextrose 5%. 1000 milliLiter(s) IV Continuous <Continuous>  dextrose 5%. 1000 milliLiter(s) IV Continuous <Continuous>  dextrose 5%. 1000 milliLiter(s) IV Continuous <Continuous>  dextrose 50% Injectable 25 Gram(s) IV Push once  dextrose 50% Injectable 12.5 Gram(s) IV Push once  dextrose 50% Injectable 25 Gram(s) IV Push once  dextrose Oral Gel 15 Gram(s) Oral once PRN  ertapenem  IVPB      ferrous    sulfate 325 milliGRAM(s) Oral daily  folic acid 1 milliGRAM(s) Oral daily  glucagon  Injectable 1 milliGRAM(s) IntraMuscular once  heparin   Injectable 5000 Unit(s) SubCutaneous every 12 hours  pancrelipase  (CREON 36,000 Lipase Units) 10 Capsule(s) Oral three times a day with meals  pantoprazole    Tablet 40 milliGRAM(s) Oral before breakfast  polyethylene glycol 3350 17 Gram(s) Oral daily  prenatal multivitamin 1 Tablet(s) Oral daily  senna 2 Tablet(s) Oral at bedtime          Radiology:          HPI:  23 y/o  at 24.1 weeks gestation sent up from ER via EMS w/ service dog c/o abdominal pain with nausea and vomiting for the past week, and pink vaginal discharge since last night. She was seen at [facility] last Friday for these complaints, and ruled out for labor and sent home w/ Robaxin. Pt took 2 pills and d/c'ed as she states it did not help her. Pt then saw private OB following Monday (3/18) and rx'ed her Indomethacin (pharmacy never filled), and omeprazole. 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 Pt states she ate breakfast ate 9a (bagel) and vomited soon after. Pt's blood sugar downstairs in ER was 269. Pt currently has PICC line receiving Ertapenem IV at home x4 weeks for osteomyelitis from an infected root canal approx 1 year ago. Pt also endorses mild intermittent period-like cramping with intermittent diarrhea. Denies LOF. Endorses +FM.    PNC with Linus Yepez; last seen on 3/18  Last ate at 9am  Allergies: PCN (anaphylaxis)  Current medications: PNV, Creon 16785 units 10 capsules TID (last took 5 pills this AM with breakfast sandwich, then vomited after), Ertapenem qd through PICC line for last 4 weeks (last dose today, home health nurse maintains), famotidine 20mg qd, Omeprazole 20mg qd  Medical/surgical history:      -T1DM on insulin pump. 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 In DBT program 3x/week. Feels safe at home; lives with significant other    Endocrine: sees Dr. Head in [place] [phone]  Dental: sees Dr. Nicole Mulligan in [facility] [phone]  OB: Dr. 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 During pregnancy has been managed by her OB at [facility]  Last hemoglobin A1c: 6.6%  Home regimen: Omnipod 5 w/ humalog. Uses DEXCOM  Basal:  12a 0.6  4a 0.9  8a 1.4  4p 1.2  ICR:  1:15  ISF 1:65  IOB 4 hrs  target 110-130  Last site change: today  Blood sugar: has DEXCOM, data for last 7 days reviewed:  Average glucose 63%; 12% very high, 31% high, 52% in range, 3% low, 2% very low; Trend w/ hypoglycemia 12am-4am, high from 4am-4pm  Hypoglycemia episodes: frequently has hypoglycemia into the 30-50s. Has hypoglycemia unawareness. Has a service dog (Serbian Moore) that stays with her at all times to alert her.  Microvascular complications: Patient denies hx nephropathy, retinopathy, or neuropathy.  Macrovascular complications: No history of CAD or CVA.    Patient with hypoglycemia to 50s last night (reportedly to 30s on DEXCOM). Now improved s/p suspension of pump and D5.       PAST MEDICAL & SURGICAL HISTORY:  H/O type 1 diabetes mellitus      History of PCOS      Anxiety      Acute osteomyelitis      History of Whipple procedure      History of tonsillectomy      H/O dilation and curettage          FAMILY HISTORY:      Social History:    Outpatient Medications: Omnipod 5 w/ humalog    MEDICATIONS  (STANDING):  dextrose 5%. 1000 milliLiter(s) (100 mL/Hr) IV Continuous <Continuous>  dextrose 5%. 1000 milliLiter(s) (50 mL/Hr) IV Continuous <Continuous>  dextrose 5%. 1000 milliLiter(s) (50 mL/Hr) IV Continuous <Continuous>  dextrose 50% Injectable 25 Gram(s) IV Push once  dextrose 50% Injectable 12.5 Gram(s) IV Push once  dextrose 50% Injectable 25 Gram(s) IV Push once  ertapenem  IVPB      ferrous    sulfate 325 milliGRAM(s) Oral daily  folic acid 1 milliGRAM(s) Oral daily  glucagon  Injectable 1 milliGRAM(s) IntraMuscular once  heparin   Injectable 5000 Unit(s) SubCutaneous every 12 hours  pancrelipase  (CREON 36,000 Lipase Units) 10 Capsule(s) Oral three times a day with meals  pantoprazole    Tablet 40 milliGRAM(s) Oral before breakfast  polyethylene glycol 3350 17 Gram(s) Oral daily  prenatal multivitamin 1 Tablet(s) Oral daily  senna 2 Tablet(s) Oral at bedtime    MEDICATIONS  (PRN):  dextrose Oral Gel 15 Gram(s) Oral once PRN Blood Glucose LESS THAN 70 milliGRAM(s)/deciliter      Allergies    penicillin (Hives)  Augmentin (Anaphylaxis)    Intolerances      Review of Systems:  Constitutional: No fever  Eyes: No blurry vision  Neuro: No tremors  HEENT: No pain  Cardiovascular: No chest pain, palpitations  Respiratory: No SOB, no cough  GI: + nausea, no vomiting, + abdominal pain  : No dysuria  Skin: no rash  Psych: no depression  Endocrine: no polyuria, polydipsia  Hem/lymph: no swelling  Osteoporosis: no fractures    ALL OTHER SYSTEMS REVIEWED AND NEGATIVE    PHYSICAL EXAM:  VITALS: T(C): 36.7 (24 @ 07:43)  T(F): 98.06 (24 @ 07:43), Max: 98.8 (24 @ 12:21)  HR: 90 (24 @ 07:43) (77 - 126)  BP: 116/71 (24 @ 07:43) (92/50 - 126/73)  RR:  (14 - 20)  SpO2:  (97% - 97%)  Wt(kg): --  GENERAL: NAD, well-groomed, well-developed  EYES: No proptosis, no lid lag, anicteric  HEENT:  Atraumatic, Normocephalic, moist mucous membranes  RESPIRATORY: Normal respiratory effort; no audible wheezing  SKIN: insulin pump on left arm, DEXCOM on right arm  MUSCULOSKELETAL: Full range of motion, normal strength  NEURO: sensation intact, extraocular movements intact, no tremor  PSYCH: Alert and oriented x 3, normal affect, normal mood  CUSHING'S SIGNS: no striae                          8.4    5.49  )-----------( 210      ( 23 Mar 2024 22:16 )             25.3           136  |  102  |  10  ----------------------------<  134<H>  3.7   |  23  |  0.41<L>    eGFR: 140    Ca    8.6          TPro  5.9<L>  /  Alb  3.3  /  TBili  0.3  /  DBili  x   /  AST  24  /  ALT  16  /  AlkPhos  52        A1C with Estimated Average Glucose Result: 6.6 % (24 @ 15:15)      CAPILLARY BLOOD GLUCOSE  221 (23 Mar 2024 23:39)  32 (23 Mar 2024 23:29)  51 (23 Mar 2024 23:21)      POCT Blood Glucose.: 189 mg/dL (24 Mar 2024 08:41)  POCT Blood Glucose.: 220 mg/dL (24 Mar 2024 07:42)  POCT Blood Glucose.: 170 mg/dL (24 Mar 2024 06:14)  POCT Blood Glucose.: 108 mg/dL (24 Mar 2024 04:17)  POCT Blood Glucose.: 99 mg/dL (24 Mar 2024 03:08)  POCT Blood Glucose.: 72 mg/dL (24 Mar 2024 02:39)  POCT Blood Glucose.: 61 mg/dL (24 Mar 2024 02:28)  POCT Blood Glucose.: 79 mg/dL (24 Mar 2024 01:39)  POCT Blood Glucose.: 72 mg/dL (24 Mar 2024 01:08)  POCT Blood Glucose.: 50 mg/dL (24 Mar 2024 00:53)  POCT Blood Glucose.: 185 mg/dL (24 Mar 2024 00:26)  POCT Blood Glucose.: 51 mg/dL (24 Mar 2024 00:19)  POCT Blood Glucose.: 286 mg/dL (23 Mar 2024 23:37)  POCT Blood Glucose.: 51 mg/dL (23 Mar 2024 23:21)  POCT Blood Glucose.: 140 mg/dL (23 Mar 2024 21:59)  POCT Blood Glucose.: 127 mg/dL (23 Mar 2024 19:38)  POCT Blood Glucose.: 156 mg/dL (23 Mar 2024 18:09)  POCT Blood Glucose.: 194 mg/dL (23 Mar 2024 16:57)  POCT Blood Glucose.: 310 mg/dL (23 Mar 2024 14:12)  POCT Blood Glucose.: 269 mg/dL (23 Mar 2024 12:17)      Thyroid Function Tests:  dextrose 5%. 1000 milliLiter(s) IV Continuous <Continuous>  dextrose 5%. 1000 milliLiter(s) IV Continuous <Continuous>  dextrose 5%. 1000 milliLiter(s) IV Continuous <Continuous>  dextrose 50% Injectable 25 Gram(s) IV Push once  dextrose 50% Injectable 12.5 Gram(s) IV Push once  dextrose 50% Injectable 25 Gram(s) IV Push once  dextrose Oral Gel 15 Gram(s) Oral once PRN  ertapenem  IVPB      ferrous    sulfate 325 milliGRAM(s) Oral daily  folic acid 1 milliGRAM(s) Oral daily  glucagon  Injectable 1 milliGRAM(s) IntraMuscular once  heparin   Injectable 5000 Unit(s) SubCutaneous every 12 hours  pancrelipase  (CREON 36,000 Lipase Units) 10 Capsule(s) Oral three times a day with meals  pantoprazole    Tablet 40 milliGRAM(s) Oral before breakfast  polyethylene glycol 3350 17 Gram(s) Oral daily  prenatal multivitamin 1 Tablet(s) Oral daily  senna 2 Tablet(s) Oral at bedtime          Radiology:

## 2024-03-24 NOTE — PROGRESS NOTE ADULT - ASSESSMENT
24y  at 24w2d with PMH of T1DM on pump s/p whipple for insulinoma () admitted for 1-week history of severe abdominal/back pain associated with vomiting. Of note, patient also has significant hx of osteomyelitis s/p dental work, now on daily ertapenem with PICC line in situ. Low concern for PTL at this time. Fetal status reassuring. Given patient with complex medical history, admitted for further workup of her symptoms. Overnight, patient had several episodes of hypoglycemia overnight despite glucose administration and stopping pump.       #T1DM  #Nausea/vomiting  #Insulinoma s/p Whipple  - No sign/symptoms of DKA  - Persistent hypoglycemia  - Endocrine contacted by charge RN/Nurse educator - recommended stopping insulin pump and giving lantus 16u (60% of total basal rate); lantus held 2/2 persistent hypoglycemia and severity of symptoms  - GI consulted, recommend RUQ US, Lipase, c/w home PPI, start bowel regimen, give creon with each meal   - Regular diet overnight     #Chronic diarrhea  - C diff neg, f/u GI PCR   - GI to evaluate in AM recommended RUQ and continuation of home PPI    # Tooth infection on multiple weeks of antibiotics  - PICC in situ, culture to be sent; not to be used for access  - f/u BCx  - Continue home Invanz  - ID consulted  - Dental consulted    # Back pain  - Tylenol x1 overnight with improvement  - per pt, was Rx Indocin, possible fibroid on BSUS, consider indocin for pain this AM \    # Vaginal bleeding  - resolved  - EFM/toco no signs of  contractions  - CL completed by NP >2.5 cm   - no indication for BMZ    RPatil PGY3

## 2024-03-24 NOTE — PROVIDER CONTACT NOTE (HYPOGLYCEMIA EVENT) - NS PROVIDER CONTACT BACKGROUND-HYPO
Age: 24y    Gender: Female    POCT Blood Glucose:  79 mg/dL (03-24-24 @ 01:39)  72 mg/dL (03-24-24 @ 01:08)  50 mg/dL (03-24-24 @ 00:53)  185 mg/dL (03-24-24 @ 00:26)  51 mg/dL (03-24-24 @ 00:19)  221 mg/dL (03-23-24 @ 23:39)  286 mg/dL (03-23-24 @ 23:37)  32 mg/dL (03-23-24 @ 23:29)  51 mg/dL (03-23-24 @ 23:21)  140 mg/dL (03-23-24 @ 21:59)      eMAR:  dextrose 50% Injectable   25 Gram(s) IV Push (03-23-24 @ 23:35)    dextrose 50% Injectable   25 Gram(s) IV Push (03-24-24 @ 00:21)      Patient called RN to bedside for an episode of hypoglycemia, with her sensor reading "critical low". Pt reported feeling reported feeling dizzy, diaphoretic, and tachycardic. FS read 51 at 2321, and 4oz apple juice given. Patient reported feeling worse, MD Garay called to bedside and additional 4oz apple juice given, FS read 32 at 2329 and pt became lethargic. Verbal order for 25g of IV dextrose ordered and given at 2335. Age: 24y    Gender: Female    POCT Blood Glucose:  79 mg/dL (03-24-24 @ 01:39)  72 mg/dL (03-24-24 @ 01:08)  50 mg/dL (03-24-24 @ 00:53)  185 mg/dL (03-24-24 @ 00:26)  51 mg/dL (03-24-24 @ 00:19)  221 mg/dL (03-23-24 @ 23:39)  286 mg/dL (03-23-24 @ 23:37)  32 mg/dL (03-23-24 @ 23:29)  51 mg/dL (03-23-24 @ 23:21)  140 mg/dL (03-23-24 @ 21:59)      eMAR:  dextrose 50% Injectable   25 Gram(s) IV Push (03-23-24 @ 23:35)    dextrose 50% Injectable   25 Gram(s) IV Push (03-24-24 @ 00:21)      Patient called RN to bedside for an episode of hypoglycemia, with her sensor reading "critical low". Pt reported feeling dizzy, diaphoretic, and tachycardic. FS read 51 at 2321, and 4oz apple juice given. Patient reported feeling worse, MD Garay called to bedside and additional 4oz apple juice given, FS read 32 at 2329 and pt became lethargic. Verbal order for 25g of IV dextrose ordered and given at 2335, symptoms quickly resolved and repeat FS was 286. MD Yuan, MD Garay and MD Ashby at bedside, ultrasound performed and EFM applied.

## 2024-03-24 NOTE — CONSULT NOTE ADULT - CONSULT REASON
T3c DM
nausea/emesis/diarrhea in pregnancy, known diabetes, vaginal bleeding
h/o OM
Abdominal pain, nausea and vomiting

## 2024-03-24 NOTE — CHART NOTE - NSCHARTNOTEFT_GEN_A_CORE
Notified by PGY4 Jarrod that patient had episode of hypoglycemia requiring 25mg IVP of dextrose as per hypoglycemia protocol.     Additional episode of hypoglycemia noted at approx 1215a, requiring 12.5mg IVP of dextrose as per protocol.     Patient seen at bedside with Dr. Yuan () and Dr. Garay (PGY4). Patient reports intermittent severe upper abdominal and back pain. She was previously given IV tylenol with some improvement in the pain.     Bedside US performed, possible fibroid seen at region of pain noted by patient, in anterior fundal region.     FHT reassuring. No contractions noted on tocometer.     M Dr. Martin made aware of these incidents. Patient will be permitted to eat, Creon to be given along with food. Patient operating own pump and paused insulin for 2hrs at this episode.     RPatil PGY3 Notified by PGY4 Jarrod that patient had episode of hypoglycemia requiring 25mg IVP of dextrose as per hypoglycemia protocol.     Additional episode of hypoglycemia noted at approx 1215a, requiring 12.5mg IVP of dextrose as per protocol.     Patient seen at bedside with Dr. Yuan () and Dr. Garay (PGY4). Patient reports intermittent severe upper abdominal and back pain. She was previously given IV tylenol with some improvement in the pain.     Bedside US performed, possible fibroid seen at region of pain noted by patient, in anterior fundal region.     FHT reassuring. No contractions noted on tocometer.     M Dr. Martin made aware of these incidents. Patient will be permitted to eat, Creon to be given along with food. Patient operating own pump and paused insulin for 2hrs at this episode.     Pramod PGY3    2am Nursing leadership escalated issue of hypoglycemic issues and consulted Endocrinology.     Endo rec: calculate total basal dosage of insulin and give 60% of this as stat lantus dose.   Endocrine to see pt in AM.     Pramod PGY3

## 2024-03-24 NOTE — PROGRESS NOTE ADULT - ATTENDING COMMENTS
MFM Attending Note  Patient seen at bedside.   Patient reports hypoglycemic episode overnight for decreased oral intake yesterday/NPO.  Patient reported having abdominal pain around time of hypoglycemia but not since then.   Patient states normal fetal movement and denies symptoms of  labor.    ICU Vital Signs Last 24 Hrs  T(C): 36.6 (24 Mar 2024 18:01), Max: 37.2 (24 Mar 2024 14:43)  T(F): 97.8 (24 Mar 2024 18:01), Max: 99 (24 Mar 2024 14:43)  HR: 88 (24 Mar 2024 18:01) (80 - 126)  BP: 108/71 (24 Mar 2024 18:01) (108/67 - 126/73)  BP(mean): --  ABP: --  ABP(mean): --  RR: 17 (24 Mar 2024 18:01) (15 - 20)  SpO2: 98% (24 Mar 2024 18:01) (94% - 99%)    O2 Parameters below as of 24 Mar 2024 18:01  Patient On (Oxygen Delivery Method): room air    abdomen: soft, nontender, gravid  extremities: no calf tenderness    a/p:  24 y.o.  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     In hospital evaluation continued since hypoglycemic episode overnight. Endocrine decrease overnight basal rate and patient to have diabetic diet today with pre and post prandial glucose monitoring. Started bowel regimen to assist with abdominal pain/diarrhea, so far evaluation negative including GI PCR. If recurrence of pain, could be concern of bowel obstruction and would need to re-evaluate by GI vs surgery depending on clinical course. Patient also anemia will give IV iron. Continued antibiotics for oral infection, so far blood/urine cultures negative.   NST BID has been appropriate for gestation.  Patient for potential discharge tomorrow if pain remains resolved and without hypoglycemic episodes overnight. Patient understanding of plan and in agreement to continue monitoring at this time.

## 2024-03-24 NOTE — PROVIDER CONTACT NOTE (OTHER) - SITUATION
(late charting due to patient care) finger stick taken 1 hour post meal completion. finger stick 220. patient denies s/s of hyperglycemia. VSS. patient denies any pain. patient a&o x4.

## 2024-03-24 NOTE — PROVIDER CONTACT NOTE (OTHER) - RECOMMENDATIONS
finger stick after meal 220. patient to have repeat finger stick in 1 hour. 5% dextrose IV fluids discontinued. IV to be saline locked.

## 2024-03-25 ENCOUNTER — ASOB RESULT (OUTPATIENT)
Age: 24
End: 2024-03-25

## 2024-03-25 ENCOUNTER — APPOINTMENT (OUTPATIENT)
Dept: ANTEPARTUM | Facility: CLINIC | Age: 24
End: 2024-03-25
Payer: COMMERCIAL

## 2024-03-25 ENCOUNTER — TRANSCRIPTION ENCOUNTER (OUTPATIENT)
Age: 24
End: 2024-03-25

## 2024-03-25 VITALS
TEMPERATURE: 98 F | DIASTOLIC BLOOD PRESSURE: 78 MMHG | SYSTOLIC BLOOD PRESSURE: 125 MMHG | OXYGEN SATURATION: 97 % | RESPIRATION RATE: 17 BRPM | HEART RATE: 94 BPM

## 2024-03-25 PROBLEM — Z86.39 PERSONAL HISTORY OF OTHER ENDOCRINE, NUTRITIONAL AND METABOLIC DISEASE: Chronic | Status: ACTIVE | Noted: 2024-03-23

## 2024-03-25 PROBLEM — M86.10 OTHER ACUTE OSTEOMYELITIS, UNSPECIFIED SITE: Chronic | Status: ACTIVE | Noted: 2024-03-23

## 2024-03-25 PROBLEM — F41.9 ANXIETY DISORDER, UNSPECIFIED: Chronic | Status: ACTIVE | Noted: 2024-03-23

## 2024-03-25 PROBLEM — Z87.42 PERSONAL HISTORY OF OTHER DISEASES OF THE FEMALE GENITAL TRACT: Chronic | Status: ACTIVE | Noted: 2024-03-23

## 2024-03-25 LAB
ALBUMIN SERPL ELPH-MCNC: 3.1 G/DL — LOW (ref 3.3–5)
ALP SERPL-CCNC: 47 U/L — SIGNIFICANT CHANGE UP (ref 40–120)
ALT FLD-CCNC: 14 U/L — SIGNIFICANT CHANGE UP (ref 4–33)
ANION GAP SERPL CALC-SCNC: 12 MMOL/L — SIGNIFICANT CHANGE UP (ref 7–14)
AST SERPL-CCNC: 19 U/L — SIGNIFICANT CHANGE UP (ref 4–32)
BILIRUB SERPL-MCNC: 0.3 MG/DL — SIGNIFICANT CHANGE UP (ref 0.2–1.2)
BUN SERPL-MCNC: 12 MG/DL — SIGNIFICANT CHANGE UP (ref 7–23)
C PEPTIDE SERPL-MCNC: <0.1 NG/ML — LOW (ref 1.1–4.4)
CALCIUM SERPL-MCNC: 8.2 MG/DL — LOW (ref 8.4–10.5)
CHLORIDE SERPL-SCNC: 100 MMOL/L — SIGNIFICANT CHANGE UP (ref 98–107)
CO2 SERPL-SCNC: 23 MMOL/L — SIGNIFICANT CHANGE UP (ref 22–31)
CREAT SERPL-MCNC: 0.41 MG/DL — LOW (ref 0.5–1.3)
CULTURE RESULTS: SIGNIFICANT CHANGE UP
CULTURE RESULTS: SIGNIFICANT CHANGE UP
EGFR: 140 ML/MIN/1.73M2 — SIGNIFICANT CHANGE UP
GLUCOSE SERPL-MCNC: 76 MG/DL — SIGNIFICANT CHANGE UP (ref 70–99)
HCT VFR BLD CALC: 24.8 % — LOW (ref 34.5–45)
HGB BLD-MCNC: 8 G/DL — LOW (ref 11.5–15.5)
MCHC RBC-ENTMCNC: 30.9 PG — SIGNIFICANT CHANGE UP (ref 27–34)
MCHC RBC-ENTMCNC: 32.3 GM/DL — SIGNIFICANT CHANGE UP (ref 32–36)
MCV RBC AUTO: 95.8 FL — SIGNIFICANT CHANGE UP (ref 80–100)
NRBC # BLD: 0 /100 WBCS — SIGNIFICANT CHANGE UP (ref 0–0)
NRBC # FLD: 0 K/UL — SIGNIFICANT CHANGE UP (ref 0–0)
PLATELET # BLD AUTO: 207 K/UL — SIGNIFICANT CHANGE UP (ref 150–400)
POTASSIUM SERPL-MCNC: 3.5 MMOL/L — SIGNIFICANT CHANGE UP (ref 3.5–5.3)
POTASSIUM SERPL-SCNC: 3.5 MMOL/L — SIGNIFICANT CHANGE UP (ref 3.5–5.3)
PROT SERPL-MCNC: 6 G/DL — SIGNIFICANT CHANGE UP (ref 6–8.3)
RBC # BLD: 2.59 M/UL — LOW (ref 3.8–5.2)
RBC # FLD: 12 % — SIGNIFICANT CHANGE UP (ref 10.3–14.5)
SODIUM SERPL-SCNC: 135 MMOL/L — SIGNIFICANT CHANGE UP (ref 135–145)
SPECIMEN SOURCE: SIGNIFICANT CHANGE UP
SPECIMEN SOURCE: SIGNIFICANT CHANGE UP
WBC # BLD: 7.23 K/UL — SIGNIFICANT CHANGE UP (ref 3.8–10.5)
WBC # FLD AUTO: 7.23 K/UL — SIGNIFICANT CHANGE UP (ref 3.8–10.5)

## 2024-03-25 PROCEDURE — 76805 OB US >/= 14 WKS SNGL FETUS: CPT | Mod: 26

## 2024-03-25 PROCEDURE — 99232 SBSQ HOSP IP/OBS MODERATE 35: CPT

## 2024-03-25 RX ORDER — ACETAMINOPHEN 500 MG
1000 TABLET ORAL ONCE
Refills: 0 | Status: COMPLETED | OUTPATIENT
Start: 2024-03-25 | End: 2024-03-25

## 2024-03-25 RX ORDER — FAMOTIDINE 10 MG/ML
1 INJECTION INTRAVENOUS
Refills: 0 | DISCHARGE

## 2024-03-25 RX ORDER — INSULIN LISPRO 100/ML
0 VIAL (ML) SUBCUTANEOUS
Qty: 0 | Refills: 0 | DISCHARGE
Start: 2024-03-25

## 2024-03-25 RX ORDER — CHLORHEXIDINE GLUCONATE 213 G/1000ML
1 SOLUTION TOPICAL
Refills: 0 | Status: DISCONTINUED | OUTPATIENT
Start: 2024-03-25 | End: 2024-03-25

## 2024-03-25 RX ORDER — SODIUM CHLORIDE 9 MG/ML
1000 INJECTION, SOLUTION INTRAVENOUS
Refills: 0 | Status: DISCONTINUED | OUTPATIENT
Start: 2024-03-25 | End: 2024-03-25

## 2024-03-25 RX ORDER — ERTAPENEM SODIUM 1 G/1
1 INJECTION, POWDER, LYOPHILIZED, FOR SOLUTION INTRAMUSCULAR; INTRAVENOUS
Qty: 10 | Refills: 0
Start: 2024-03-25 | End: 2024-04-03

## 2024-03-25 RX ORDER — FERROUS SULFATE 325(65) MG
1 TABLET ORAL
Qty: 0 | Refills: 0 | DISCHARGE
Start: 2024-03-25

## 2024-03-25 RX ORDER — DEXTROSE 50 % IN WATER 50 %
25 SYRINGE (ML) INTRAVENOUS ONCE
Refills: 0 | Status: COMPLETED | OUTPATIENT
Start: 2024-03-25 | End: 2024-03-25

## 2024-03-25 RX ADMIN — SODIUM CHLORIDE 100 MILLILITER(S): 9 INJECTION, SOLUTION INTRAVENOUS at 05:38

## 2024-03-25 RX ADMIN — Medication 1000 MILLIGRAM(S): at 02:22

## 2024-03-25 RX ADMIN — Medication 400 MILLIGRAM(S): at 02:07

## 2024-03-25 RX ADMIN — Medication 1000 MILLIGRAM(S): at 08:31

## 2024-03-25 RX ADMIN — PANTOPRAZOLE SODIUM 40 MILLIGRAM(S): 20 TABLET, DELAYED RELEASE ORAL at 08:16

## 2024-03-25 RX ADMIN — Medication 10 CAPSULE(S): at 08:50

## 2024-03-25 RX ADMIN — Medication 25 GRAM(S): at 05:18

## 2024-03-25 RX ADMIN — ERTAPENEM SODIUM 120 MILLIGRAM(S): 1 INJECTION, POWDER, LYOPHILIZED, FOR SOLUTION INTRAMUSCULAR; INTRAVENOUS at 10:24

## 2024-03-25 RX ADMIN — SODIUM CHLORIDE 3 MILLILITER(S): 9 INJECTION INTRAMUSCULAR; INTRAVENOUS; SUBCUTANEOUS at 05:56

## 2024-03-25 RX ADMIN — Medication 10 CAPSULE(S): at 13:21

## 2024-03-25 RX ADMIN — Medication 400 MILLIGRAM(S): at 08:16

## 2024-03-25 NOTE — CHART NOTE - NSCHARTNOTESELECT_GEN_ALL_CORE
GI
MRI not completed/Event Note
Hypoglycemia/Event Note
R3 Hypoglycemic Event
R3 Overnight Evaluation
VE/Event Note
endocrinology/Event Note

## 2024-03-25 NOTE — PROGRESS NOTE ADULT - SUBJECTIVE AND OBJECTIVE BOX
Follow Up:      Inverval History/ROS:Patient is a 24y old  Female who presents with a chief complaint of     No fever  Less abdominal pain. Pain has intermitent.   No pain at this time  Afebrile.      Allergies    penicillin (Hives)  Augmentin (Anaphylaxis)    Intolerances        ANTIMICROBIALS:  ertapenem  IVPB        OTHER MEDS:  dextrose 5%. 1000 milliLiter(s) IV Continuous <Continuous>  dextrose 5%. 1000 milliLiter(s) IV Continuous <Continuous>  dextrose 5%. 1000 milliLiter(s) IV Continuous <Continuous>  dextrose 50% Injectable 25 Gram(s) IV Push once  dextrose 50% Injectable 12.5 Gram(s) IV Push once  dextrose 50% Injectable 25 Gram(s) IV Push once  dextrose Oral Gel 15 Gram(s) Oral once PRN  ferrous    sulfate 325 milliGRAM(s) Oral daily  folic acid 1 milliGRAM(s) Oral daily  glucagon  Injectable 1 milliGRAM(s) IntraMuscular once  heparin   Injectable 5000 Unit(s) SubCutaneous every 12 hours  insulin lispro (HumaLOG) Pump 1 Each SubCutaneous Continuous Pump  pancrelipase  (CREON 36,000 Lipase Units) 10 Capsule(s) Oral three times a day with meals  pantoprazole    Tablet 40 milliGRAM(s) Oral before breakfast  polyethylene glycol 3350 17 Gram(s) Oral daily  prenatal multivitamin 1 Tablet(s) Oral daily  senna 2 Tablet(s) Oral at bedtime      Vital Signs Last 24 Hrs  T(C): 36.7 (24 Mar 2024 07:43), Max: 37.1 (23 Mar 2024 12:21)  T(F): 98.06 (24 Mar 2024 07:43), Max: 98.8 (23 Mar 2024 12:21)  HR: 90 (24 Mar 2024 07:43) (77 - 126)  BP: 116/71 (24 Mar 2024 07:43) (92/50 - 126/73)  BP(mean): --  RR: 15 (24 Mar 2024 07:43) (14 - 20)  SpO2: 97% (23 Mar 2024 12:21) (97% - 97%)    Parameters below as of 23 Mar 2024 16:05  Patient On (Oxygen Delivery Method): room air        PHYSICAL EXAM:  General: x[ ] non-toxic  HEAD/EYES: [ ] PERRL x[ ] white sclera [ ] icterus  ENT:  [ ] normal x[ ] supple [ ] thrush [ ] pharyngeal exudate  Cardiovascular:   [ ] murmur [x ] normal [ ] PPM/AICD  Respiratory:  [x ] clear to ausculation bilaterally  GI:  [x ] soft, non-tender, normal bowel sounds  :  [ ] cabrera [ ] no CVA tenderness   Musculoskeletal:  [ ] no synovitis  Neurologic:  [ ] non-focal exam   Skin:  [x ] no rash  Lymph: [ ] no lymphadenopathy  Psychiatric:  [ ] appropriate affect [ ] alert & oriented  Lines:  [ x] no phlebitis [ ] central line                                8.4    5.49  )-----------( 210      ( 23 Mar 2024 22:16 )             25.3       03-23    136  |  102  |  10  ----------------------------<  134<H>  3.7   |  23  |  0.41<L>    Ca    8.6      23 Mar 2024 22:16    TPro  5.9<L>  /  Alb  3.3  /  TBili  0.3  /  DBili  x   /  AST  24  /  ALT  16  /  AlkPhos  52  03-23      Urinalysis Basic - ( 23 Mar 2024 22:16 )    Color: x / Appearance: x / SG: x / pH: x  Gluc: 134 mg/dL / Ketone: x  / Bili: x / Urobili: x   Blood: x / Protein: x / Nitrite: x   Leuk Esterase: x / RBC: x / WBC x   Sq Epi: x / Non Sq Epi: x / Bacteria: x        MICROBIOLOGY:    RADIOLOGY:    
PGY 3 Antepartum Note    Patient seen and examined at bedside in NAD throughout the night during episodes of hypoglycemia. After patient ate, she is now feeling better and has been able to sleep. Reports good fetal movement.    T(F): --  HR: 102 (00:28)  BP: 118/72 (00:28)  RR: 16 (00:28)    NST appropriate for gestational age     Gen: NAD  Abd: gravid, nontender  SVE: deferred    MEDICATIONS  (STANDING):  dextrose 5%. 1000 milliLiter(s) (100 mL/Hr) IV Continuous <Continuous>  dextrose 5%. 1000 milliLiter(s) (50 mL/Hr) IV Continuous <Continuous>  dextrose 5%. 1000 milliLiter(s) (50 mL/Hr) IV Continuous <Continuous>  dextrose 50% Injectable 25 Gram(s) IV Push once  dextrose 50% Injectable 12.5 Gram(s) IV Push once  dextrose 50% Injectable 25 Gram(s) IV Push once  ertapenem  IVPB      ertapenem  IVPB 1000 milliGRAM(s) IV Intermittent once  ertapenem  IVPB 1000 milliGRAM(s) IV Intermittent every 24 hours  ferrous    sulfate 325 milliGRAM(s) Oral daily  folic acid 1 milliGRAM(s) Oral daily  glucagon  Injectable 1 milliGRAM(s) IntraMuscular once  insulin glargine Injectable (LANTUS) 16 Unit(s) SubCutaneous once  pancrelipase  (CREON 12,000 Lipase Units) 1 Capsule(s) Oral three times a day with meals  pancrelipase  (CREON 36,000 Lipase Units) 1 Capsule(s) Oral three times a day with meals  pantoprazole    Tablet 40 milliGRAM(s) Oral before breakfast  prenatal multivitamin 1 Tablet(s) Oral daily  senna 2 Tablet(s) Oral at bedtime  sodium chloride 0.9%. 1000 milliLiter(s) (125 mL/Hr) IV Continuous <Continuous>    MEDICATIONS  (PRN):  dextrose Oral Gel 15 Gram(s) Oral once PRN Blood Glucose LESS THAN 70 milliGRAM(s)/deciliter      Labs:                        8.4    5.49  )-----------( 210      ( 23 Mar 2024 22:16 )             25.3     03-23    136  |  102  |  10  ----------------------------<  134<H>  3.7   |  23  |  0.41<L>    Ca    8.6      23 Mar 2024 22:16    TPro  5.9<L>  /  Alb  3.3  /  TBili  0.3  /  DBili  x   /  AST  24  /  ALT  16  /  AlkPhos  52  03-23      Urinalysis Basic - ( 23 Mar 2024 22:16 )    Color: x / Appearance: x / SG: x / pH: x  Gluc: 134 mg/dL / Ketone: x  / Bili: x / Urobili: x   Blood: x / Protein: x / Nitrite: x   Leuk Esterase: x / RBC: x / WBC x   Sq Epi: x / Non Sq Epi: x / Bacteria: x          
Went to see patient today but she was at MRI. Came back this later afternoon and patient already discharged.
Follow Up:      Inverval History/ROS:Patient is a 24y old  Female who presents with a chief complaint of Abd pain N/V/D (25 Mar 2024 12:47)    No fver  C/o nausea  Low blood glucose    Allergies    penicillin (Hives)  Augmentin (Anaphylaxis)    Intolerances        ANTIMICROBIALS:  ertapenem  IVPB 1000 every 24 hours      OTHER MEDS:  chlorhexidine 2% Cloths 1 Application(s) Topical <User Schedule>  dextrose 5%. 1000 milliLiter(s) IV Continuous <Continuous>  dextrose 5%. 1000 milliLiter(s) IV Continuous <Continuous>  dextrose 5%. 1000 milliLiter(s) IV Continuous <Continuous>  dextrose 5%. 1000 milliLiter(s) IV Continuous <Continuous>  dextrose 50% Injectable 25 Gram(s) IV Push once  dextrose 50% Injectable 12.5 Gram(s) IV Push once  dextrose 50% Injectable 25 Gram(s) IV Push once  dextrose Oral Gel 15 Gram(s) Oral once PRN  dextrose Oral Gel 15 Gram(s) Oral once PRN  famotidine    Tablet 20 milliGRAM(s) Oral at bedtime  ferrous    sulfate 325 milliGRAM(s) Oral daily  glucagon  Injectable 1 milliGRAM(s) IntraMuscular once  heparin   Injectable 5000 Unit(s) SubCutaneous every 12 hours  insulin lispro (HumaLOG) Pump 1 Each SubCutaneous Continuous Pump  pancrelipase  (CREON 36,000 Lipase Units) 10 Capsule(s) Oral three times a day with meals  pantoprazole    Tablet 40 milliGRAM(s) Oral before breakfast  polyethylene glycol 3350 17 Gram(s) Oral daily  prenatal multivitamin 1 Tablet(s) Oral daily  senna 2 Tablet(s) Oral at bedtime  sodium chloride 0.9% lock flush 3 milliLiter(s) IV Push every 12 hours      Vital Signs Last 24 Hrs  T(C): 36.9 (25 Mar 2024 13:58), Max: 37.2 (24 Mar 2024 14:43)  T(F): 98.5 (25 Mar 2024 13:58), Max: 99 (24 Mar 2024 14:43)  HR: 94 (25 Mar 2024 13:58) (85 - 108)  BP: 125/78 (25 Mar 2024 13:58) (106/63 - 125/78)  BP(mean): --  RR: 17 (25 Mar 2024 13:58) (16 - 17)  SpO2: 97% (25 Mar 2024 13:58) (94% - 99%)    Parameters below as of 25 Mar 2024 13:58  Patient On (Oxygen Delivery Method): room air        PHYSICAL EXAM:  General: [ x] non-toxic  HEAD/EYES: [ ] PERRL [ x] white sclera [ ] icterus  ENT:  [ ] normal [ x] supple [ ] thrush [ ] pharyngeal exudate  Cardiovascular:   [ ] murmur [x ] normal [ ] PPM/AICD  Respiratory:  [ x] clear to ausculation bilaterally  GI:  x[ ] soft, non-tender, normal bowel sounds  :  [ ] cabrera [ ] no CVA tenderness   Musculoskeletal:  [ ] no synovitis  Neurologic:  [ ] non-focal exam   Skin:  [x ] no rash  Lymph: [x ] no lymphadenopathy  Psychiatric:  [ ] appropriate affect [ ] alert & oriented  Lines:  [x ] no phlebitis [ ] central line                                8.0    7.23  )-----------( 207      ( 25 Mar 2024 06:19 )             24.8       03-25    135  |  100  |  12  ----------------------------<  76  3.5   |  23  |  0.41<L>    Ca    8.2<L>      25 Mar 2024 06:19    TPro  6.0  /  Alb  3.1<L>  /  TBili  0.3  /  DBili  x   /  AST  19  /  ALT  14  /  AlkPhos  47  03-25      Urinalysis Basic - ( 25 Mar 2024 06:19 )    Color: x / Appearance: x / SG: x / pH: x  Gluc: 76 mg/dL / Ketone: x  / Bili: x / Urobili: x   Blood: x / Protein: x / Nitrite: x   Leuk Esterase: x / RBC: x / WBC x   Sq Epi: x / Non Sq Epi: x / Bacteria: x        MICROBIOLOGY:Culture Results:   <10,000 CFU/mL Normal Urogenital Aliya (03-23-24 @ 22:30)  Culture Results:   Culture in progress (03-23-24 @ 16:24)  Culture Results:   No growth at 24 hours (03-23-24 @ 15:24)  Culture Results:   No growth at 24 hours (03-23-24 @ 15:15)  Culture Results:   <10,000 CFU/mL Normal Urogenital Aliya (03-23-24 @ 14:13)    Culture - Blood (03.23.24 @ 15:24)    Specimen Source: .Blood Blood-Venous   Culture Results:   No growth at 24 hours      RADIOLOGY:    
PGY 3 Antepartum Note    Patient seen and examined at bedside in NAD. Denies any CTX, LOF or VB.  Reports good fetal movement.   Reports last had abdominal pain at approximately 8pm. Reports having bolused 5u on her insulin pump for her meal around that time and then vomited meal. She had episodes of hypoglycemia overnight, but now feels back to normal, denies abdominal pain. Service dog at bedside.     T(F): 98.2 (06:05)  HR: 99 (06:05)  BP: 118/73 (06:05)  RR: 17 (06:05)    NST appropriate fot GA    Gen: NAD  Abd: gravid, nontender  SVE: deferred    MEDICATIONS  (STANDING):  dextrose 5%. 1000 milliLiter(s) (100 mL/Hr) IV Continuous <Continuous>  dextrose 5%. 1000 milliLiter(s) (50 mL/Hr) IV Continuous <Continuous>  dextrose 5%. 1000 milliLiter(s) (100 mL/Hr) IV Continuous <Continuous>  dextrose 5%. 1000 milliLiter(s) (50 mL/Hr) IV Continuous <Continuous>  dextrose 50% Injectable 25 Gram(s) IV Push once  dextrose 50% Injectable 12.5 Gram(s) IV Push once  dextrose 50% Injectable 25 Gram(s) IV Push once  ertapenem  IVPB 1000 milliGRAM(s) IV Intermittent every 24 hours  famotidine    Tablet 20 milliGRAM(s) Oral at bedtime  ferrous    sulfate 325 milliGRAM(s) Oral daily  glucagon  Injectable 1 milliGRAM(s) IntraMuscular once  heparin   Injectable 5000 Unit(s) SubCutaneous every 12 hours  insulin lispro (HumaLOG) Pump 1 Each SubCutaneous Continuous Pump  pancrelipase  (CREON 36,000 Lipase Units) 10 Capsule(s) Oral three times a day with meals  pantoprazole    Tablet 40 milliGRAM(s) Oral before breakfast  polyethylene glycol 3350 17 Gram(s) Oral daily  prenatal multivitamin 1 Tablet(s) Oral daily  senna 2 Tablet(s) Oral at bedtime  sodium chloride 0.9% lock flush 3 milliLiter(s) IV Push every 12 hours    MEDICATIONS  (PRN):  dextrose Oral Gel 15 Gram(s) Oral once PRN Blood Glucose LESS THAN 70 milliGRAM(s)/deciliter  dextrose Oral Gel 15 Gram(s) Oral once PRN Blood Glucose LESS THAN 70 milliGRAM(s)/deciliter    Labs:                        8.0    7.23  )-----------( 207      ( 25 Mar 2024 06:19 )             24.8     03-23    136  |  102  |  10  ----------------------------<  134<H>  3.7   |  23  |  0.41<L>    Ca    8.6      23 Mar 2024 22:16    TPro  5.9<L>  /  Alb  3.3  /  TBili  0.3  /  DBili  x   /  AST  24  /  ALT  16  /  AlkPhos  52  03-23      Urinalysis Basic - ( 23 Mar 2024 22:16 )    Color: x / Appearance: x / SG: x / pH: x  Gluc: 134 mg/dL / Ketone: x  / Bili: x / Urobili: x   Blood: x / Protein: x / Nitrite: x   Leuk Esterase: x / RBC: x / WBC x   Sq Epi: x / Non Sq Epi: x / Bacteria: x      Drug Screen W/PCP, Urine: Done (03-24-24 @ 10:23)      A/P:   24y at Gestational Age     -     pregnancy  - NST BID  - EFW

## 2024-03-25 NOTE — PROVIDER CONTACT NOTE (HYPOGLYCEMIA EVENT) - NS PROVIDER CONTACT BACKGROUND-HYPO
Age: 24y    Gender: Female    POCT Blood Glucose:  40 mg/dL (03-25-24 @ 05:10)  33 mg/dL (03-25-24 @ 05:08)  128 mg/dL (03-24-24 @ 23:05)  88 mg/dL (03-24-24 @ 22:19)  79 mg/dL (03-24-24 @ 21:44)  65 mg/dL (03-24-24 @ 21:42)  64 mg/dL (03-24-24 @ 21:23)  63 mg/dL (03-24-24 @ 21:21)      eMAR:

## 2024-03-25 NOTE — CHART NOTE - NSCHARTNOTEFT_GEN_A_CORE
R3 notified of overnight FS of 33. On OB evaluation patient responsive albeit diaphoretic. Denied abdominal pain at this time. Denied VB, LOF.    Vital Signs Last 24 Hrs  T(C): 36.7 (25 Mar 2024 02:10), Max: 37.2 (24 Mar 2024 14:43)  T(F): 98 (25 Mar 2024 02:10), Max: 99 (24 Mar 2024 14:43)  HR: 86 (25 Mar 2024 02:10) (82 - 108)  BP: 106/63 (25 Mar 2024 02:10) (106/63 - 116/71)  BP(mean): --  RR: 17 (25 Mar 2024 02:10) (15 - 17)  SpO2: 99% (25 Mar 2024 02:10) (94% - 99%)    Parameters below as of 25 Mar 2024 02:10  Patient On (Oxygen Delivery Method): room air    Plan:  -Endocrine STAT called, plan to pause pump at this time and for pump setting to be readjusted in the morning.   -Pump to be paused until FS are more stabilized  -D5 fluids started  -D5 IVP given  -Patient more responsive and less symptomatic after above mentioned interventions    discussed w Dr Martin, PGY 5  RIKY Nino PGY3

## 2024-03-25 NOTE — DISCHARGE NOTE ANTEPARTUM - HOSPITAL COURSE
25yo  at 24w3d with a complicated medical history including T1DM secondary to a whipple procedure for an insulinoma, recent osteomyolitis in the setting of a root canal on IV antibiotics and history of gastroparesis, anorexia, chronic diarrhea who presented with intermittent abdominal pain, nausea/vomiting and vaginal bleeding. Patient reports that vaginal bleeding resolved, no evidence of  contractions and SVE over multiple days unchanged with cervix closed (repeated today 3/25). Abdominal pain intermittent and severe in nature, discussed possibility of intermittent obstruction vs. recurrence of insulinoma and recommend MRI to rule out bowel obstruction. Patient has continued receiving her IV antibiotics during hospital stay. Fetus status is reassuring with NST appropriate for gestational age, will get sonogram today. Patient with very fickle T1DM with episodes of hypoglycemia overnight. She reports this was better controlled at home where she is able to eat snacks as needed. Will consult nutrition today. Patient eager to go home given challenge with glucose control, will plan for MRI and sono today and reevaluation. Additionally, interested in transferring care, will help facilitate.  23yo  at 24w3d with a complicated medical history including T1DM secondary to a whipple procedure for an insulinoma, recent osteomyolitis in the setting of a root canal on IV antibiotics and history of gastroparesis, anorexia, chronic diarrhea who presented with intermittent abdominal pain, nausea/vomiting and vaginal bleeding. Patient reports that vaginal bleeding resolved, no evidence of  contractions and SVE over multiple days unchanged with cervix closed (repeated today 3/25). Abdominal pain intermittent and severe in nature, discussed possibility of intermittent obstruction vs. recurrence of insulinoma and recommend MRI to rule out bowel obstruction. Patient has continued receiving her IV antibiotics during hospital stay for osteomyelitis Fetus status is reassuring with NST appropriate for gestational age. ATU sono today 3/25:  EFW 834grams(90%)     T1DM with episodes of hypoglycemia overnight. She reports this was better controlled at home where she is able to eat snacks as needed. Will consult nutrition today. Patient eager to go home given challenge with glucose control, will plan for MRI and sono today and reevaluation. Upon arrival to MRI patient refused to take off dexcom due to lack of additional supplies at home. MRI not obtained. Spoke to [facility] regarding discharge, ok for discharge home with follow up outaptient.  Pt interested in transferring care to [facility], provided list of OBGYN and email sent for appt for consult with [facility].

## 2024-03-25 NOTE — DISCHARGE NOTE ANTEPARTUM - PATIENT PORTAL LINK FT
You can access the FollowMyHealth Patient Portal offered by [facility] by registering at the following website: [email or web address]. By joining [facility]’s FollowMyHealth portal, you will also be able to view your health information using other applications (apps) compatible with our system.

## 2024-03-25 NOTE — CHART NOTE - NSCHARTNOTEFT_GEN_A_CORE
Chart reviewed.    GI PCR and RUQ US negative.    Patient has abdominal pain and N/V, possibly from transient partial SBO (with underlying scar tissue) or gastroparesis (stated that couldn't take Reglan due to side effects).   Patient would like to go home as her glucose control and insulin dosing are not optimized with the current diet (difficulty to get frequent small meals while on the hospital meal schedule). Glycemic fluctuation are likely to potentiate and worsen her abdominal symptoms.   May try pantoprazole 40 mg BID instead of pantoprazole/famotidine combo if no OB contraindications.   No further GI intervention is planned at this time. Patient may follow with Dr. Heather Leavitt outpatient for further management.    If patient remains inpatient, if possible with meal schedule, would provide patient with snacks or meals so she can have small, low fat diet x 5 - 6 meals (instead of 3 meals a day) to help with her symptoms.     GI team will sign off at this time. Please reconsult as needed.

## 2024-03-25 NOTE — CHART NOTE - NSCHARTNOTEFT_GEN_A_CORE
MD called to bedside 2/2 patient reporting abdominal pain overnight with a concomitant episode of nausea. On MD evaluation patient rated the pain as a 6/10. . Of note, patient has been complaining of abdominal pain throughout her hospital course. At its worse, patient's abdominal pain has reached a 10/10 during the hospitalization. States that the pain she is currently experiencing is different in nature to the severe pain. Describes it as more dull. Denied vaginal bleeding and leakage of fluid    Vital Signs Last 24 Hrs  T(C): 36.7 (25 Mar 2024 02:10), Max: 37.2 (24 Mar 2024 14:43)  T(F): 98 (25 Mar 2024 02:10), Max: 99 (24 Mar 2024 14:43)  HR: 86 (25 Mar 2024 02:10) (82 - 108)  BP: 106/63 (25 Mar 2024 02:10) (106/63 - 116/71)  BP(mean): --  RR: 17 (25 Mar 2024 02:10) (15 - 17)  SpO2: 99% (25 Mar 2024 02:10) (94% - 99%)    Parameters below as of 25 Mar 2024 02:10  Patient On (Oxygen Delivery Method): room air    Gen: NAD, resting comfortably in bed  CV: RRR  CVA: No CVA tenderness  Lungs: Breathing comfortably on RA  Abd: Soft, gravid, non-tender, no guarding, no rebound    Plan  -Given benign abdominal exam, no role for imaging at this time will continue to monitor for worsening abdominal pain  -IV Tylenol given  -AM susy Nino PGY3

## 2024-03-25 NOTE — DISCHARGE NOTE ANTEPARTUM - PLAN OF CARE
- Follow up with OB within one week  - - continue insulin pump and following with maternal fetal medicine /endocrine  - monitor glucose before and one hour after meals - continue Invanz as prescribed via PICC line   - Follow up with your doctor for care - Follow up with OB within one week  - MFM consult via [facility] to be set up: Email sent for appt. expect phone call in a few days - Follow up with OB within one week  - MFM consult via [facility] to be set up: Email sent for appt. expect phone call in a few days  - Can follow up with Clint LI at next available appt. (call [phone])

## 2024-03-25 NOTE — DISCHARGE NOTE ANTEPARTUM - NPI NUMBER (FOR SYSADMIN USE ONLY) :
[[id number]],[[id number]] [[id number]],[[id number]]

## 2024-03-25 NOTE — DISCHARGE NOTE ANTEPARTUM - MEDICATION SUMMARY - MEDICATIONS TO TAKE
ambulatory
I will START or STAY ON the medications listed below when I get home from the hospital:    insulin lispro (concentrated) 200 units/mL subcutaneous solution  -- subcutaneous once Insulin pump  -- Indication: For Diabetes    ertapenem 1 g injection  -- 1 gram(s) injectable once a day  -- Indication: For Osteomyelitis    Creon 36,000 units oral delayed release capsule  -- 10 cap(s) by mouth 3 times a day  -- Indication: For wipple    Prenatal Multivitamins with Folic Acid 1 mg oral tablet  -- 1 tab(s) by mouth once a day  -- Indication: For pregnancy    ferrous sulfate 325 mg (65 mg elemental iron) oral tablet  -- 1 tab(s) by mouth once a day  -- Indication: For Anemia    omeprazole 10 mg oral delayed release capsule  -- 1 cap(s) orally  -- Indication: For Home med

## 2024-03-25 NOTE — CHART NOTE - NSCHARTNOTEFT_GEN_A_CORE
VE performed for intermittent cramping abdominal pain. Denies VB/LOF. Endorses +FM.     PE:  Vital Signs Last 24 Hrs  T(C): 36.8 (25 Mar 2024 06:05), Max: 37.2 (24 Mar 2024 14:43)  T(F): 98.2 (25 Mar 2024 06:05), Max: 99 (24 Mar 2024 14:43)  HR: 88 (25 Mar 2024 09:54) (85 - 108)  BP: 118/71 (25 Mar 2024 09:54) (106/63 - 118/73)  BP(mean): --  RR: 17 (25 Mar 2024 06:05) (16 - 17)  SpO2: 97% (25 Mar 2024 09:54) (94% - 99%)    Parameters below as of 25 Mar 2024 06:05  Patient On (Oxygen Delivery Method): room air    VE:0/0/-3      Plan:  - await MRI/ Sono    d/w Dr. Hirschberg Brittany Linchangco NP

## 2024-03-25 NOTE — DISCHARGE NOTE ANTEPARTUM - CARE PROVIDER_API CALL
Linus Yepez  Maternal/Fetal Medicine  [address] [place] [address]  Phone: [phone]  Fax: [phone]  Follow Up Time:     Rajiv Cleaning  Maternal/Fetal Medicine  [address] [facility] [address]  [place] [address]  Phone: [phone]  Fax: [phone]  Follow Up Time:     Margo Lynn  Maternal/Fetal Medicine  [address] [facility] [address]  [place] [address]  Phone: [phone]  Fax: [phone]  Follow Up Time:     Namita Webster  Obstetrics and Gynecology  [address] [facility] [facility] [address]  [place] [address]  Phone: [phone]  Fax: [phone]  Follow Up Time:    Linus Yepez  Maternal/Fetal Medicine  [address] [place] [address]  Phone: [phone]  Fax: [phone]  Follow Up Time:     Rajiv Cleaning  Maternal/Fetal Medicine  [address] [facility] [address]  [place] [address]  Phone: [phone]  Fax: [phone]  Follow Up Time:     Margo Lynn  Maternal/Fetal Medicine  [address] [facility] [address]  [place] [address]  Phone: [phone]  Fax: [phone]  Follow Up Time:     Namita Webster  Obstetrics and Gynecology  [address] [facility] [address]  [place] [address]  Phone: [phone]  Fax: [phone]  Follow Up Time:     Jocelin Ram  Gastroenterology  [address] [facility]  [place] [address]  Phone: [phone]  Fax: [phone]  Follow Up Time:

## 2024-03-25 NOTE — PROGRESS NOTE ADULT - ASSESSMENT
24y  at 24w3d with PMH of T1DM on pump s/p whipple for insulinoma () admitted for 1-week history of severe abdominal/back pain associated with vomiting. Of note, patient also has significant hx of osteomyelitis s/p dental work, now on daily ertapenem with PICC line in situ. Low concern for PTL at this time. Fetal status reassuring. Given patient with complex medical history, admitted for further workup of her symptoms. Overnight, patient again had additional episodes of hypoglycemia overnight despite glucose administration and stopping pump.     #T1DM  #Nausea/vomiting  #Insulinoma s/p Whipple  - No sign/symptoms of DKA  - Persistent hypoglycemia, insulin pump restarted 1hr after pause as per Endocrine recs   - Endocrine changes to pump regimen, appreciate recs: Omnipod5 with humalog. Uses DEXCOM  Basal:  12a 0.55  4a 0.95  8a 1.45  4p 1.2  ICR:  1:15  ISF 1:65  IOB 4 hrs  target 110-130  If for any reason the pump becomes dysfunctional or falls off, they should receive 21 units lantus STAT  goal fasting BG <95, 1hr post prandial <140, 2hr post prandial <120  - GI consulted, recommend RUQ US, Lipase, c/w home PPI, start bowel regimen, give Creon with each meal   - Regular (CCD) diet     #Chronic diarrhea  - C diff (3/23): neg  - GI recs: RUQ US wnl, GI PCR negative, CMP wnl, lipase normal, CBC with normocytic anemia to 8s.  Continue Creon, daily PPI, Pepcid qHS, small frequent low fat meals, blood glucose management for endocrine, should have outpatient GI followup for consideration of cholestyramine    # Tooth infection on multiple weeks of antibiotics  - PICC in situ, f/u Cx  - BCx (3/23): NGTD @ 24h  - UCx (3/23): neg  - Continue home Invanz  - ID consulted  - Dental consulted    # Back pain  - Tylenol x1 overnight with improvement  - per pt, was Rx Indocin, possible fibroid on BSUS, consider indocin for pain this AM     # Vaginal bleeding  - resolved  - EFM/toco no signs of  contractions  - CL completed by NP >2.5 cm   - no indication for BMZ    RPatil PGY3  24y  at 24w3d with PMH of T1DM on pump s/p whipple for insulinoma () admitted for 1-week history of severe abdominal/back pain associated with vomiting. Of note, patient also has significant hx of osteomyelitis s/p dental work, now on daily ertapenem with PICC line in situ. Low concern for PTL at this time. Fetal status reassuring. Given patient with complex medical history, admitted for further workup of her symptoms. Overnight, patient again had additional episodes of hypoglycemia overnight despite glucose administration and stopping pump.     #T1DM  #Nausea/vomiting  #Insulinoma s/p Whipple  - No sign/symptoms of DKA  - Persistent hypoglycemia, insulin pump restarted 1hr after pause as per Endocrine recs   - Endocrine changes to pump regimen, appreciate recs: Omnipod5 with humalog. Uses DEXCOM  Basal:  12a 0.55  4a 0.95  8a 1.45  4p 1.2  ICR:  1:15  ISF 1:65  IOB 4 hrs  target 110-130  If for any reason the pump becomes dysfunctional or falls off, they should receive 21 units lantus STAT  goal fasting BG <95, 1hr post prandial <140, 2hr post prandial <120  - GI consulted, recommend RUQ US, Lipase, c/w home PPI, start bowel regimen, give Creon with each meal   - Regular (CCD) diet     #Chronic diarrhea  - C diff (3/23): neg  - GI recs: RUQ US wnl, GI PCR negative, CMP wnl, lipase normal, CBC with normocytic anemia to 8s.  Continue Creon, daily PPI, Pepcid qHS, small frequent low fat meals, blood glucose management for endocrine, should have outpatient GI followup for consideration of cholestyramine    # Tooth infection on multiple weeks of antibiotics  - PICC in situ, f/u Cx  - BCx (3/23): NGTD @ 24h  - UCx (3/23): neg  - Continue home Invanz  - ID consulted  - Dental consulted    # Back pain  - Tylenol x1 overnight with improvement  - per pt, was Rx Indocin, possible fibroid on BSUS, consider indocin for pain this AM     # Vaginal bleeding  - resolved  - EFM/toco no signs of  contractions  - CL completed by NP >2.5 cm   - no indication for BMZ    RPatil PGY3     MFM Fellow Addendum   23yo  at 24w3d with a complicated medical history including T1DM secondary to a whipple procedure for an insulinoma, recent osteomyolitis in the setting of a root canal on IV antibiotics and history of gastroparesis, anorexia, chronic diarrhea who presented with intermittent abdominal pain, nausea/vomiting and vaginal bleeding. Patient reports that vaginal bleeding resolved, no evidence of  contractions and SVE over multiple days unchanged with cervix closed (repeated today 3/25). Abdominal pain intermittent and severe in nature, discussed possibility of intermittent obstruction vs. recurrence of insulinoma and recommend MRI to rule out bowel obstruction. Patient has continued receiving her IV antibiotics during hospital stay. Fetus status is reassuring with NST appropriate for gestational age, will get sonogram today. Patient with very fickle T1DM with episodes of hypoglycemia overnight. She reports this was better controlled at home where she is able to eat snacks as needed. Will consult nutrition today. Patient eager to go home given challenge with glucose control, will plan for MRI and sono today and reevaluation. Additionally, interested in transferring care, will help facilitate.     Carly Hirschberg, MFM Fellow   KRYSTLE Lee Attending

## 2024-03-25 NOTE — PROVIDER CONTACT NOTE (HYPOGLYCEMIA EVENT) - NS PROVIDER CONTACT CONTRIBUTING FACTORS OF EPISODE
Patient had 1 episode of emesis after dinner. Patient gave herself 5 units of insulin lispro pre meal./Other (Specify)
Patient NPO greater than 8 hours
pt has insulin pump.

## 2024-03-25 NOTE — PROVIDER CONTACT NOTE (HYPOGLYCEMIA EVENT) - NS PROVIDER CONTACT RECOMMEND-HYPO
call MD Nino.  MD Nino at bedside. 
MD notified. As per Dr. Donaldson hold basal insulin. Routine FS checks. 
RN escalated to charge RN, ANM, nurse educator and MD Yuan. Awaiting plan from endocrine and M.

## 2024-03-25 NOTE — DISCHARGE NOTE ANTEPARTUM - CARE PLAN
1 Principal Discharge DX:	Abdominal pain in pregnancy  Assessment and plan of treatment:	- Follow up with OB within one week  -  Secondary Diagnosis:	Diabetes  Assessment and plan of treatment:	- continue insulin pump and following with maternal fetal medicine /endocrine  - monitor glucose before and one hour after meals  Secondary Diagnosis:	Osteomyelitis  Assessment and plan of treatment:	- continue Invanz as prescribed via PICC line   - Follow up with your doctor for care   Principal Discharge DX:	Abdominal pain in pregnancy  Assessment and plan of treatment:	- Follow up with OB within one week  - MFM consult via [facility] to be set up: Email sent for appt. expect phone call in a few days  Secondary Diagnosis:	Diabetes  Assessment and plan of treatment:	- continue insulin pump and following with maternal fetal medicine /endocrine  - monitor glucose before and one hour after meals  Secondary Diagnosis:	Osteomyelitis  Assessment and plan of treatment:	- continue Invanz as prescribed via PICC line   - Follow up with your doctor for care   Principal Discharge DX:	Abdominal pain in pregnancy  Assessment and plan of treatment:	- Follow up with OB within one week  - MFM consult via [facility] to be set up: Email sent for appt. expect phone call in a few days  - Can follow up with [facility] at next available appt. (call [phone])  Secondary Diagnosis:	Diabetes  Assessment and plan of treatment:	- continue insulin pump and following with maternal fetal medicine /endocrine  - monitor glucose before and one hour after meals  Secondary Diagnosis:	Osteomyelitis  Assessment and plan of treatment:	- continue Invanz as prescribed via PICC line   - Follow up with your doctor for care

## 2024-03-25 NOTE — DISCHARGE NOTE ANTEPARTUM - CARE PROVIDERS DIRECT ADDRESSES
,[email or web address]

## 2024-03-25 NOTE — ADVANCED PRACTICE NURSE CONSULT - REASON FOR CONSULT
23 y/o  at 24.1 weeks gestation sent up from ER via EMS w/ service dog c/o abdominal pain with nausea and vomiting for the past week, and pink vaginal discharge since evening of 3/23. She was seen at [facility] last Friday for these complaints and ruled out for labor and sent home w/ Robaxin. Pt took 2 pills and d/c'ed as she states it did not help her. Pt then saw private OB following Monday (3/18) and rx'ed her Indomethacin (pharmacy never filled), and omeprazole. 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 Pt states she ate breakfast ate 9a (bagel) and vomited soon after. Pt also endorses mild intermittent period-like cramping with intermittent diarrhea. Endocrine consulted for diabetes management. CDCES consulted because patient uses and insulin pump to manage her diabetes.

## 2024-03-25 NOTE — DISCHARGE NOTE ANTEPARTUM - DO NOT DIET OR “STARVE” YOURSELF INTO GETTING BACK TO PRE-PREGNANCY SHAPE
Date of Last Office Visit: 5/10/23  Date of Next Office Visit: none scheduled, will [facility] to [facility] sent  No shows since last visit: 1 on 6/5/23  Cancellations since last visit: 0    Medication requested: propranolol (INDERAL) 10 MG tablet Date last ordered: 5/10/23 Qty: 90 Refills: 0    Medication requested: venlafaxine (EFFEXOR XR) 37.5 MG 24 hr capsule Date last ordered: 5/10/23 Qty: 98 Refills: 0    Medication requested: ALPRAZolam (XANAX) 0.5 MG tablet Date last ordered: 5/10/23 Qty: 8 Refills: 0     Review of [place] ?: yes  Medication last filled date: Alprazolam   5/10/23 Qty filled: 8  Other controlled substance on [place] ?: no    Lapse in medication adherence greater than 5 days?: no  If yes, call patient and gather details: na  Medication refill request verified as identical to current order?: yes  Result of Last DAM, VPA, Li+ Level, CBC, or Carbamazepine Level (at or since last visit): N/A    Last visit treatment plan:   MEDICATIONS:   -start VENLAFAXINE XR 37.5mg x 7 days then increase to 75mg daily x 7 days then increase to 112.5mg x 7 days then increase to 150mg daily  -start PROPRANOLOL 10-20mg TID PRN for anxiety/panic  -continue ALPRAZOLAM 0.5mg daily if needed for panic   -ok to start MAGNESIUM GLYCINATE 200mg at bedtime x 7 nights; ok to increase to 400mg nightly if needed for sleep quality and anxiety  -hold HYDROXYZINE for now  FOLLOW-UP: Follow up in 4 weeks    []Medication refilled per  Medication Refill in Ambulatory Care  policy.  [x]Medication unable to be refilled by RN due to criteria not met as indicated below:    []Eligibility - not seen in the last year   []Supervision - no future appointment   []Compliance - no shows, cancellations or lapse in therapy   []Verification - order discrepancy   []Controlled medication   []Medication not included in policy   []90-day supply request   [x]Other out of scope of CMA    
Statement Selected

## 2024-03-25 NOTE — CHART NOTE - NSCHARTNOTEFT_GEN_A_CORE
Patient went down to MRI Abd/pelvis as ordered per MFM. When patient went down to MRI tech told patient she could not wear dexcom in machine. Pt did not want to take off dexcom due to lack of additional supplies here or at home. RN called Diabetes educator/ pharmacy to see if we could get additional supplies so patient could replace here. Diabetes educator/ pharmacy did not have supplies to change out dexcom after MRI. Pt refused at that time. Diabetes educator Radha Soto spoke to pt when she got back from MRI. Since unable to obtain MRI and patient wishing to leave the hospital, allowing discharge with close outpatient follow up. Pt wishes to tranfer care to [facility] from [facility]. MFM consult for [facility] to be scheduled as well as diabetes follow up. Until those appts are scheduled she will continue to follow with [facility]. GI doctor/ info given to patient for follow up with them as well.       d/w MFM Dr. Hirschberg  who d/w Dr Hermila Morocho NP

## 2024-03-25 NOTE — DISCHARGE NOTE ANTEPARTUM - PROVIDER TOKENS
PROVIDER:[TOKEN:[[id number]]],PROVIDER:[TOKEN:[[id number]]],PROVIDER:[TOKEN:[[id number]]],PROVIDER:[TOKEN:[[id number]]] PROVIDER:[TOKEN:[[id number]]],PROVIDER:[TOKEN:[[id number]]],PROVIDER:[TOKEN:[[id number]]],PROVIDER:[TOKEN:[[id number]]],PROVIDER:[TOKEN:[[id number]]]

## 2024-03-25 NOTE — PROVIDER CONTACT NOTE (HYPOGLYCEMIA EVENT) - NS PROVIDER CONTACT NOTE-TREATMENT-HYPO
4 oz Fruit Juice (Specify quantity, date/time)/Dextrose 50% 25 Grams IV Push
Dextrose 50% 25 Grams IV Push
gave 4oz of apple juice at 20:22  gave 4oz of apple juice at 20:37  D5W @ 50ml/hr started @ 21:00/4 oz Fruit Juice (Specify quantity, date/time)

## 2024-03-25 NOTE — PROGRESS NOTE ADULT - ASSESSMENT
24 year old female with a history of a pnacreatic tumor s/p whipple and splenectomy and recent diagnosis of odontogenic infection with concern for OM/ extension to sinus presents at 24 weeks for evaluation for n/v with cocnern for DKA    She notes an ongoing issue with a dental infection.  She had a root canal about a yuear ago that was complicated by infection. She was seen by Dr Mulligan in [place] about three months ago.  At that time, purulence was noted at the site of the root canal.  Imaging raised concern for a bone infection.   She was referred to oral surgery at [facility],.   She was given azithromycin and then clindamycin.       Repeat imaging showed progression.  She states she was admitted to [facility] about 2 weeks ago and started on ertapenem .  The plan was for 4 weeks of ertapenem.   She states she has 8 days left.   She states she has been tolerating the antibiotics.  She states that her teeth/ face feel a bit better    Overall, patient reports an odontogenic infection with concern for OM    1) Continue ertapenem as prescribed by her outside physicians.  She should continue ertapenem until she can be seen by her prescribing physicians.  This infection does not appear to be related to her current presentation. Gi evaluating her symptoms    2) If she remains at [facility], please request full records including her prior imaging and cultures    3) Patient is s/p splenectomy.  She is not aware if she was vaccinated post splenectomy.  She should follow up with her pmd to ensure she was vaccinated for strep pneumonia, H influenza and meningitis post splenectomy and to discuss needed boosters every five years.     4) Nausea- GI eval in progress. Plan for imaging    She has a follow up appt with ID at [facility]    Discussed with MFM and gyn  Call ID service with additional questions

## 2024-03-27 ENCOUNTER — NON-APPOINTMENT (OUTPATIENT)
Age: 24
End: 2024-03-27

## 2024-03-28 LAB
CULTURE RESULTS: SIGNIFICANT CHANGE UP
CULTURE RESULTS: SIGNIFICANT CHANGE UP
SPECIMEN SOURCE: SIGNIFICANT CHANGE UP
SPECIMEN SOURCE: SIGNIFICANT CHANGE UP

## 2024-04-01 ENCOUNTER — NON-APPOINTMENT (OUTPATIENT)
Age: 24
End: 2024-04-01

## 2024-04-02 ENCOUNTER — APPOINTMENT (OUTPATIENT)
Dept: FAMILY MEDICINE | Facility: CLINIC | Age: 24
End: 2024-04-02
Payer: COMMERCIAL

## 2024-04-02 VITALS
WEIGHT: 183 LBS | TEMPERATURE: 99.3 F | RESPIRATION RATE: 14 BRPM | HEIGHT: 65 IN | DIASTOLIC BLOOD PRESSURE: 60 MMHG | BODY MASS INDEX: 30.49 KG/M2 | HEART RATE: 99 BPM | SYSTOLIC BLOOD PRESSURE: 120 MMHG | OXYGEN SATURATION: 99 %

## 2024-04-02 DIAGNOSIS — D3A.8 OTHER BENIGN NEUROENDOCRINE TUMORS: ICD-10-CM

## 2024-04-02 DIAGNOSIS — M54.50 LOW BACK PAIN, UNSPECIFIED: ICD-10-CM

## 2024-04-02 PROCEDURE — 99214 OFFICE O/P EST MOD 30 MIN: CPT

## 2024-04-02 RX ORDER — PANCRELIPASE 36000; 180000; 114000 [USP'U]/1; [USP'U]/1; [USP'U]/1
36000-114000 CAPSULE, DELAYED RELEASE PELLETS ORAL
Qty: 900 | Refills: 11 | Status: ACTIVE | COMMUNITY
Start: 2023-05-23 | End: 1900-01-01

## 2024-04-02 RX ORDER — NORETHINDRONE ACETATE AND ETHINYL ESTRADIOL AND FERROUS FUMARATE 1MG-20(21)
1-20 KIT ORAL
Qty: 84 | Refills: 0 | Status: DISCONTINUED | COMMUNITY
Start: 2023-01-24 | End: 2024-04-02

## 2024-04-02 NOTE — ASSESSMENT
[FreeTextEntry1] : Neuroendocrine tumor renew creon 90552  10 capsules tid 900 11 rf  Pregnancy back pain chiropractor- medical massage physical therapy 2 x week

## 2024-04-02 NOTE — REVIEW OF SYSTEMS
[Recent Change In Weight] : ~T recent weight change [Abdominal Pain] : abdominal pain [Muscle Pain] : muscle pain [Joint Pain] : joint pain [Back Pain] : back pain

## 2024-04-02 NOTE — HEALTH RISK ASSESSMENT
[Intercurrent hospitalizations] : was admitted to the hospital  [No] : In the past 12 months have you used drugs other than those required for medical reasons? No [de-identified] : LIJ abd pain [Audit-CScore] : 0 [No falls in past year] : Patient reported no falls in the past year [de-identified] : no [de-identified] : no [Never] : Never

## 2024-04-02 NOTE — HISTORY OF PRESENT ILLNESS
[FreeTextEntry1] : patient presents as looking for general check up, wants to get some recommendations. [de-identified] : 23 yo wf hx of Neuroendocrine tumor  currently 25 weeks pregnant.  She has back pain and pelvic pain. Gi sissues she was admitted for LIj for contraction pains. and they wanted to determine if she was pre term labor. she was throwing up and  her abdomen was so tight. she  had the episode at work and passed out in the bathroom and called ems and had vaginal bleeding . she was confirmed to not be in  labor. it could have been from straining . she was admitted to Labor and delivery for 3 d. the doctor saw a mass on upper left side  ? intestines , the adhesions and the bowel could be looping around the bady and unloop itself. when the pain attacks happen  she is on Colace and simethicone  to break up the gas and constipation. it is important to keep things moving . the episodes lasts 1/2 hr it is debilitating  I had a terrible tooth infection.  I am waiting to go to them monday i had a botched root canal  Recommendation for chiro and medical massage.   I need renewal of creon

## 2024-04-12 ENCOUNTER — NON-APPOINTMENT (OUTPATIENT)
Age: 24
End: 2024-04-12

## 2024-04-15 ENCOUNTER — APPOINTMENT (OUTPATIENT)
Dept: OBGYN | Facility: CLINIC | Age: 24
End: 2024-04-15

## 2024-04-22 ENCOUNTER — NON-APPOINTMENT (OUTPATIENT)
Age: 24
End: 2024-04-22

## 2024-04-30 ENCOUNTER — NON-APPOINTMENT (OUTPATIENT)
Age: 24
End: 2024-04-30

## 2024-05-14 ENCOUNTER — NON-APPOINTMENT (OUTPATIENT)
Age: 24
End: 2024-05-14

## 2024-06-04 ENCOUNTER — NON-APPOINTMENT (OUTPATIENT)
Age: 24
End: 2024-06-04

## 2024-06-12 ENCOUNTER — NON-APPOINTMENT (OUTPATIENT)
Age: 24
End: 2024-06-12

## 2024-06-25 ENCOUNTER — NON-APPOINTMENT (OUTPATIENT)
Age: 24
End: 2024-06-25

## 2024-07-18 ENCOUNTER — NON-APPOINTMENT (OUTPATIENT)
Age: 24
End: 2024-07-18

## 2024-07-19 ENCOUNTER — TRANSCRIPTION ENCOUNTER (OUTPATIENT)
Age: 24
End: 2024-07-19

## 2024-09-25 ENCOUNTER — APPOINTMENT (OUTPATIENT)
Dept: GASTROENTEROLOGY | Facility: CLINIC | Age: 24
End: 2024-09-25

## 2024-10-09 ENCOUNTER — APPOINTMENT (OUTPATIENT)
Dept: GASTROENTEROLOGY | Facility: CLINIC | Age: 24
End: 2024-10-09
Payer: COMMERCIAL

## 2024-10-09 DIAGNOSIS — Z90.410 ACQUIRED TOTAL ABSENCE OF PANCREAS: ICD-10-CM

## 2024-10-09 DIAGNOSIS — D3A.8 OTHER BENIGN NEUROENDOCRINE TUMORS: ICD-10-CM

## 2024-10-09 PROCEDURE — 99214 OFFICE O/P EST MOD 30 MIN: CPT

## 2024-10-09 RX ORDER — CHOLESTYRAMINE 4 G/9G
4 POWDER, FOR SUSPENSION ORAL DAILY
Qty: 3 | Refills: 9 | Status: ACTIVE | COMMUNITY
Start: 2024-10-09 | End: 1900-01-01

## 2024-10-09 RX ORDER — CHOLESTYRAMINE 4 G/9G
0 POWDER, FOR SUSPENSION ORAL
Qty: 0 | Refills: 9 | DISCHARGE
Start: 2024-10-09

## 2024-10-14 ENCOUNTER — APPOINTMENT (OUTPATIENT)
Dept: SURGERY | Facility: CLINIC | Age: 24
End: 2024-10-14
Payer: COMMERCIAL

## 2024-10-14 PROCEDURE — 97802 MEDICAL NUTRITION INDIV IN: CPT | Mod: 95

## 2024-10-24 ENCOUNTER — APPOINTMENT (OUTPATIENT)
Dept: FAMILY MEDICINE | Facility: CLINIC | Age: 24
End: 2024-10-24
Payer: COMMERCIAL

## 2024-10-24 VITALS
OXYGEN SATURATION: 98 % | HEIGHT: 65 IN | HEART RATE: 85 BPM | BODY MASS INDEX: 28.82 KG/M2 | RESPIRATION RATE: 14 BRPM | SYSTOLIC BLOOD PRESSURE: 110 MMHG | DIASTOLIC BLOOD PRESSURE: 68 MMHG | WEIGHT: 173 LBS

## 2024-10-24 DIAGNOSIS — F90.9 ATTENTION-DEFICIT HYPERACTIVITY DISORDER, UNSPECIFIED TYPE: ICD-10-CM

## 2024-10-24 PROCEDURE — 99214 OFFICE O/P EST MOD 30 MIN: CPT

## 2024-10-24 PROCEDURE — G2211 COMPLEX E/M VISIT ADD ON: CPT

## 2024-10-28 ENCOUNTER — TRANSCRIPTION ENCOUNTER (OUTPATIENT)
Age: 24
End: 2024-10-28

## 2024-12-14 ENCOUNTER — TRANSCRIPTION ENCOUNTER (OUTPATIENT)
Age: 24
End: 2024-12-14

## 2024-12-16 ENCOUNTER — TRANSCRIPTION ENCOUNTER (OUTPATIENT)
Age: 24
End: 2024-12-16

## 2024-12-20 ENCOUNTER — APPOINTMENT (OUTPATIENT)
Dept: FAMILY MEDICINE | Facility: CLINIC | Age: 24
End: 2024-12-20
Payer: COMMERCIAL

## 2024-12-20 VITALS
RESPIRATION RATE: 14 BRPM | DIASTOLIC BLOOD PRESSURE: 72 MMHG | SYSTOLIC BLOOD PRESSURE: 110 MMHG | HEIGHT: 65 IN | BODY MASS INDEX: 28.82 KG/M2 | HEART RATE: 80 BPM | OXYGEN SATURATION: 98 % | WEIGHT: 173 LBS

## 2024-12-20 DIAGNOSIS — F41.9 ANXIETY DISORDER, UNSPECIFIED: ICD-10-CM

## 2024-12-20 DIAGNOSIS — F32.A ANXIETY DISORDER, UNSPECIFIED: ICD-10-CM

## 2024-12-20 DIAGNOSIS — F90.9 ATTENTION-DEFICIT HYPERACTIVITY DISORDER, UNSPECIFIED TYPE: ICD-10-CM

## 2024-12-20 PROCEDURE — G2211 COMPLEX E/M VISIT ADD ON: CPT

## 2024-12-20 PROCEDURE — 99214 OFFICE O/P EST MOD 30 MIN: CPT

## 2024-12-23 LAB — HBA1C MFR BLD HPLC: 7.7

## 2024-12-27 ENCOUNTER — TRANSCRIPTION ENCOUNTER (OUTPATIENT)
Age: 24
End: 2024-12-27

## 2025-01-02 ENCOUNTER — TRANSCRIPTION ENCOUNTER (OUTPATIENT)
Age: 25
End: 2025-01-02

## 2025-01-03 ENCOUNTER — TRANSCRIPTION ENCOUNTER (OUTPATIENT)
Age: 25
End: 2025-01-03

## 2025-01-03 ENCOUNTER — EMERGENCY (EMERGENCY)
Facility: HOSPITAL | Age: 25
LOS: 1 days | Discharge: HOME | End: 2025-01-03
Attending: EMERGENCY MEDICINE | Admitting: EMERGENCY MEDICINE
Payer: COMMERCIAL

## 2025-01-03 VITALS
DIASTOLIC BLOOD PRESSURE: 70 MMHG | RESPIRATION RATE: 18 BRPM | OXYGEN SATURATION: 99 % | SYSTOLIC BLOOD PRESSURE: 114 MMHG | TEMPERATURE: 98 F | HEART RATE: 86 BPM | WEIGHT: 164.91 LBS | HEIGHT: 66 IN

## 2025-01-03 VITALS
SYSTOLIC BLOOD PRESSURE: 112 MMHG | TEMPERATURE: 98 F | OXYGEN SATURATION: 100 % | HEART RATE: 86 BPM | RESPIRATION RATE: 16 BRPM | DIASTOLIC BLOOD PRESSURE: 73 MMHG

## 2025-01-03 DIAGNOSIS — Z90.410 ACQUIRED TOTAL ABSENCE OF PANCREAS: Chronic | ICD-10-CM

## 2025-01-03 DIAGNOSIS — Z98.890 OTHER SPECIFIED POSTPROCEDURAL STATES: Chronic | ICD-10-CM

## 2025-01-03 DIAGNOSIS — Z90.89 ACQUIRED ABSENCE OF OTHER ORGANS: Chronic | ICD-10-CM

## 2025-01-03 LAB
A1C WITH ESTIMATED AVERAGE GLUCOSE RESULT: 7.5 % — HIGH (ref 4–5.6)
ALBUMIN SERPL ELPH-MCNC: 4.7 G/DL — SIGNIFICANT CHANGE UP (ref 3.3–5)
ALP SERPL-CCNC: 93 U/L — SIGNIFICANT CHANGE UP (ref 40–120)
ALT FLD-CCNC: 13 U/L — SIGNIFICANT CHANGE UP (ref 4–33)
ANION GAP SERPL CALC-SCNC: 11 MMOL/L — SIGNIFICANT CHANGE UP (ref 7–14)
APPEARANCE UR: ABNORMAL
APPEARANCE UR: CLEAR — SIGNIFICANT CHANGE UP
APTT BLD: 36.4 SEC — HIGH (ref 24.5–35.6)
AST SERPL-CCNC: 18 U/L — SIGNIFICANT CHANGE UP (ref 4–32)
B-OH-BUTYR SERPL-SCNC: <0 MMOL/L — SIGNIFICANT CHANGE UP (ref 0–0.4)
BACTERIA # UR AUTO: ABNORMAL /HPF
BACTERIA # UR AUTO: NEGATIVE /HPF — SIGNIFICANT CHANGE UP
BASOPHILS # BLD AUTO: 0.02 K/UL — SIGNIFICANT CHANGE UP (ref 0–0.2)
BASOPHILS NFR BLD AUTO: 0.3 % — SIGNIFICANT CHANGE UP (ref 0–2)
BILIRUB SERPL-MCNC: 1.1 MG/DL — SIGNIFICANT CHANGE UP (ref 0.2–1.2)
BILIRUB UR-MCNC: NEGATIVE — SIGNIFICANT CHANGE UP
BILIRUB UR-MCNC: NEGATIVE — SIGNIFICANT CHANGE UP
BLD GP AB SCN SERPL QL: NEGATIVE — SIGNIFICANT CHANGE UP
BLOOD GAS VENOUS COMPREHENSIVE RESULT: SIGNIFICANT CHANGE UP
BUN SERPL-MCNC: 17 MG/DL — SIGNIFICANT CHANGE UP (ref 7–23)
CALCIUM SERPL-MCNC: 9.7 MG/DL — SIGNIFICANT CHANGE UP (ref 8.4–10.5)
CAST: 0 /LPF — SIGNIFICANT CHANGE UP (ref 0–4)
CAST: 1 /LPF — SIGNIFICANT CHANGE UP (ref 0–4)
CHLORIDE SERPL-SCNC: 99 MMOL/L — SIGNIFICANT CHANGE UP (ref 98–107)
CO2 SERPL-SCNC: 26 MMOL/L — SIGNIFICANT CHANGE UP (ref 22–31)
COLOR SPEC: ABNORMAL
COLOR SPEC: YELLOW — SIGNIFICANT CHANGE UP
CREAT SERPL-MCNC: 0.52 MG/DL — SIGNIFICANT CHANGE UP (ref 0.5–1.3)
DIFF PNL FLD: ABNORMAL
DIFF PNL FLD: NEGATIVE — SIGNIFICANT CHANGE UP
EGFR: 132 ML/MIN/1.73M2 — SIGNIFICANT CHANGE UP
EOSINOPHIL # BLD AUTO: 0.06 K/UL — SIGNIFICANT CHANGE UP (ref 0–0.5)
EOSINOPHIL NFR BLD AUTO: 0.8 % — SIGNIFICANT CHANGE UP (ref 0–6)
ESTIMATED AVERAGE GLUCOSE: 169 — SIGNIFICANT CHANGE UP
GLUCOSE SERPL-MCNC: 223 MG/DL — HIGH (ref 70–99)
GLUCOSE UR QL: 500 MG/DL
GLUCOSE UR QL: >=1000 MG/DL
HCG SERPL-ACNC: <1 MIU/ML — SIGNIFICANT CHANGE UP
HCT VFR BLD CALC: 37.1 % — SIGNIFICANT CHANGE UP (ref 34.5–45)
HGB BLD-MCNC: 12.7 G/DL — SIGNIFICANT CHANGE UP (ref 11.5–15.5)
IANC: 5.2 K/UL — SIGNIFICANT CHANGE UP (ref 1.8–7.4)
IMM GRANULOCYTES NFR BLD AUTO: 0.1 % — SIGNIFICANT CHANGE UP (ref 0–0.9)
INR BLD: 1.04 RATIO — SIGNIFICANT CHANGE UP (ref 0.85–1.16)
KETONES UR-MCNC: ABNORMAL MG/DL
KETONES UR-MCNC: ABNORMAL MG/DL
LEUKOCYTE ESTERASE UR-ACNC: ABNORMAL
LEUKOCYTE ESTERASE UR-ACNC: NEGATIVE — SIGNIFICANT CHANGE UP
LYMPHOCYTES # BLD AUTO: 2.08 K/UL — SIGNIFICANT CHANGE UP (ref 1–3.3)
LYMPHOCYTES # BLD AUTO: 26.3 % — SIGNIFICANT CHANGE UP (ref 13–44)
MAGNESIUM SERPL-MCNC: 2 MG/DL — SIGNIFICANT CHANGE UP (ref 1.6–2.6)
MCHC RBC-ENTMCNC: 31.7 PG — SIGNIFICANT CHANGE UP (ref 27–34)
MCHC RBC-ENTMCNC: 34.2 G/DL — SIGNIFICANT CHANGE UP (ref 32–36)
MCV RBC AUTO: 92.5 FL — SIGNIFICANT CHANGE UP (ref 80–100)
MONOCYTES # BLD AUTO: 0.54 K/UL — SIGNIFICANT CHANGE UP (ref 0–0.9)
MONOCYTES NFR BLD AUTO: 6.8 % — SIGNIFICANT CHANGE UP (ref 2–14)
NEUTROPHILS # BLD AUTO: 5.2 K/UL — SIGNIFICANT CHANGE UP (ref 1.8–7.4)
NEUTROPHILS NFR BLD AUTO: 65.7 % — SIGNIFICANT CHANGE UP (ref 43–77)
NITRITE UR-MCNC: NEGATIVE — SIGNIFICANT CHANGE UP
NITRITE UR-MCNC: NEGATIVE — SIGNIFICANT CHANGE UP
NRBC # BLD: 0 /100 WBCS — SIGNIFICANT CHANGE UP (ref 0–0)
NRBC # FLD: 0 K/UL — SIGNIFICANT CHANGE UP (ref 0–0)
PH UR: 6 — SIGNIFICANT CHANGE UP (ref 5–8)
PH UR: 6.5 — SIGNIFICANT CHANGE UP (ref 5–8)
PHOSPHATE SERPL-MCNC: 2.8 MG/DL — SIGNIFICANT CHANGE UP (ref 2.5–4.5)
PLATELET # BLD AUTO: 337 K/UL — SIGNIFICANT CHANGE UP (ref 150–400)
POTASSIUM SERPL-MCNC: 4.5 MMOL/L — SIGNIFICANT CHANGE UP (ref 3.5–5.3)
POTASSIUM SERPL-SCNC: 4.5 MMOL/L — SIGNIFICANT CHANGE UP (ref 3.5–5.3)
PROT SERPL-MCNC: 7.5 G/DL — SIGNIFICANT CHANGE UP (ref 6–8.3)
PROT UR-MCNC: 100 MG/DL
PROT UR-MCNC: 30 MG/DL
PROTHROM AB SERPL-ACNC: 12.1 SEC — SIGNIFICANT CHANGE UP (ref 9.9–13.4)
RBC # BLD: 4.01 M/UL — SIGNIFICANT CHANGE UP (ref 3.8–5.2)
RBC # FLD: 11 % — SIGNIFICANT CHANGE UP (ref 10.3–14.5)
RBC CASTS # UR COMP ASSIST: 0 /HPF — SIGNIFICANT CHANGE UP (ref 0–4)
RBC CASTS # UR COMP ASSIST: 61 /HPF — HIGH (ref 0–4)
REVIEW: SIGNIFICANT CHANGE UP
RH IG SCN BLD-IMP: POSITIVE — SIGNIFICANT CHANGE UP
SODIUM SERPL-SCNC: 136 MMOL/L — SIGNIFICANT CHANGE UP (ref 135–145)
SP GR SPEC: 1.04 — HIGH (ref 1–1.03)
SP GR SPEC: 1.05 — HIGH (ref 1–1.03)
SQUAMOUS # UR AUTO: 2 /HPF — SIGNIFICANT CHANGE UP (ref 0–5)
SQUAMOUS # UR AUTO: 25 /HPF — HIGH (ref 0–5)
UROBILINOGEN FLD QL: 0.2 MG/DL — SIGNIFICANT CHANGE UP (ref 0.2–1)
UROBILINOGEN FLD QL: 1 MG/DL — SIGNIFICANT CHANGE UP (ref 0.2–1)
WBC # BLD: 7.91 K/UL — SIGNIFICANT CHANGE UP (ref 3.8–10.5)
WBC # FLD AUTO: 7.91 K/UL — SIGNIFICANT CHANGE UP (ref 3.8–10.5)
WBC UR QL: 0 /HPF — SIGNIFICANT CHANGE UP (ref 0–5)
WBC UR QL: 10 /HPF — HIGH (ref 0–5)

## 2025-01-03 PROCEDURE — 76830 TRANSVAGINAL US NON-OB: CPT | Mod: 26

## 2025-01-03 PROCEDURE — 99285 EMERGENCY DEPT VISIT HI MDM: CPT

## 2025-01-03 RX ORDER — KETOROLAC TROMETHAMINE 30 MG/ML
15 INJECTION INTRAMUSCULAR; INTRAVENOUS ONCE
Refills: 0 | Status: DISCONTINUED | OUTPATIENT
Start: 2025-01-03 | End: 2025-01-03

## 2025-01-03 RX ORDER — MORPHINE SULFATE 15 MG
4 TABLET, EXTENDED RELEASE ORAL ONCE
Refills: 0 | Status: DISCONTINUED | OUTPATIENT
Start: 2025-01-03 | End: 2025-01-03

## 2025-01-03 RX ADMIN — Medication 4 MILLIGRAM(S): at 16:35

## 2025-01-03 RX ADMIN — KETOROLAC TROMETHAMINE 15 MILLIGRAM(S): 30 INJECTION INTRAMUSCULAR; INTRAVENOUS at 16:18

## 2025-01-03 NOTE — ED PROVIDER NOTE - ATTENDING APP SHARED VISIT CONTRIBUTION OF CARE
DR. RON, ATTENDING MD-  I performed a face to face bedside interview with patient regarding history of present illness, review of symptoms and past medical history. I completed an independent physical exam.  I have discussed patient's plan of care with the NP.   Documentation as above in the note.    25 y/o female h/o whipple's iud here with c/o vag bld and pelvic pain after accidentally pulling on and breaking iud string on 12/29.  Was told at osh that she may need operative removal of iud.  Eval for iud migration, uterus perf, anemia.  Obtain cbc cmp coags t&s tvus consult ob/gyn give pain med reassess.

## 2025-01-03 NOTE — ED PROVIDER NOTE - PROGRESS NOTE DETAILS
US confirms IUD in place. UA contaminated repeat sample wnl. splke w/ GYN states have pt f/u in clinic for IUD removal if she iwshes. can take ibuprofen for pain PRN. Patient removed IV herself, removed IUD in patient room herself, IUD seen on paper towel, noted to be intact with two strings. Patient states she has relief of pain and bleeding. Patient noted to be hypoglycemic to 62, diaphoretic but alert and oriented, able to take PO. patient given apple juice and food, will recheck sugar after meal. Repeat glucose 64, patient stating she would like to leave, refusing repeat FS despite recommendations to stay. Patient states she has glucose tabs on her person and [place]. Patient provided with strict return precautions

## 2025-01-03 NOTE — ED ADULT NURSE REASSESSMENT NOTE - NS ED NURSE REASSESS COMMENT FT1
Received report from JAYLON Fragoso. Pt wants to go home, as per DASH Feng to check FS one more time. FS is 64, DASH Feng made aware. As per DASH Feng pt is okay to go home, pt given juice by ED Tech. Pt states she will go to [employer] to eat food. Pt denies dizziness, shakiness, weakness, or any other symptoms. Pt educated on hypoglycemic symptoms and treatment by DASH Feng. Safety maintained.

## 2025-01-03 NOTE — ED ADULT TRIAGE NOTE - CHIEF COMPLAINT QUOTE
Pt presents to ED ambulatory from home with c/o vaginal bleeding x 3 days. Pt was seen outpatient by OBGYN and was advised her IUD is displaced and was advised to come to ED for further eval. Pt endorses abdominal pain as well. Pt has hx of DM1 with insulin pump. Pt presents to ED ambulatory from home with c/o vaginal bleeding x 3 days. Pt was seen outpatient by OBGYN and was advised her IUD is displaced and was advised to come to ED for further eval. Pt endorses abdominal pain as well. Pt has hx of DM1 with insulin pump. Pt found to have elevated BGL.

## 2025-01-03 NOTE — ED ADULT NURSE REASSESSMENT NOTE - NS ED NURSE REASSESS COMMENT FT1
Break RN: Received report from JAYLON Viera. Pt is A&Ox4, resting in stretcher with complaints of feeling like her blood sugar is low at this time. FS to be checked. Respirations even and unlabored, chest rise equal b/l. VS as noted in flow sheets. Pt denies chest pain, SOB, abdominal pain, N/V/D, h/a, dizziness, numbness/tingling or any urinary symptoms at this time. No acute distress noted. Safety maintained throughout.

## 2025-01-03 NOTE — ED PROVIDER NOTE - NSFOLLOWUPINSTRUCTIONS_ED_ALL_ED_FT
See your primary care doctor within 24-48 hours for a post-hospital visit.   See your gyn this week for further evaluation, bring copies of all reports with you.   Your ultrasound shows your IUD is in place  Take Tylenol up to 1000mg every 4-6 hours as needed for mild pain.  Take Ibuprofen 400mg, or may take max 600mg, every 6-8 hours with food as needed for moderate pain.  warm compresses to pelvis for pain.  PELVIC REST UNTIL YOU ARE CLEARED BY YOUR GYN- no intercourse/douching/foreign objects in the vagina. Return to the ER for worsening bleeding, abdominal pain, fevers, dizziness or any other concerns.

## 2025-01-03 NOTE — ED PROVIDER NOTE - PATIENT PORTAL LINK FT
You can access the FollowMyHealth Patient Portal offered by [facility] by registering at the following website: [email or web address]. By joining [employer]’s FollowMyHealth portal, you will also be able to view your health information using other applications (apps) compatible with our system.

## 2025-01-03 NOTE — ED PROVIDER NOTE - CLINICAL SUMMARY MEDICAL DECISION MAKING FREE TEXT BOX
23 yo female  PMHx DM1 on dexcom/insulin pump, recent  2024 currently breastfeeding, anemia requiring iron infusions, recent Mirena IUD placement 1 mo ago by [facility] presents with complaint of vaginal bleeding saturating 1 pad per hr and pelvic pain x3 days after accidentally pulling out a single string of the mirena IUD. Pt has been told by [facility] that it may be imbedded in the uterine wall and needs to be surgically removed. Patient  endorsing lightheadedness. Of note, patient endorses elevated blood sugars per dexcom. Denies syncope, blurry vision, Chest pain, SOB, n/v/d, or urinary complaints.   On exam, patient well appearing, NAD, VSS in triage, abdomen soft, non-tender,   Differential dx including but not limited to anemia vs IUD displacement, r/o DKA  Plan to order labs, transvaginal US, GYN consult,

## 2025-01-03 NOTE — ED ADULT NURSE NOTE - CHIEF COMPLAINT QUOTE
Pt presents to ED ambulatory from home with c/o vaginal bleeding x 3 days. Pt was seen outpatient by OBGYN and was advised her IUD is displaced and was advised to come to ED for further eval. Pt endorses abdominal pain as well. Pt has hx of DM1 with insulin pump. Pt found to have elevated BGL.

## 2025-01-03 NOTE — ED PROVIDER NOTE - PHYSICAL EXAMINATION
PHYSICAL EXAM:    GENERAL: Alert, appears stated age, well appearing, non-toxic  SKIN: Warm, pink and dry. MMM.   HEAD: NC, AT, no step offs   EYE: Normal lids/conjunctiva, PERRL, EOMI  ENT: Normal hearing, patent oropharynx without erythema or exudate,   NECK: +supple. No meningismus, or JVD,  Pulm: Bilateral BS, normal resp effort, no wheezes, stridor, or retractions  CV: RRR, no M/R/G, 2+and = radial pulses  Abd: soft, non-tender, non-distended, no hepatosplenomegaly. no CVA tenderness.   Mskel: no erythema, cyanosis, edema. no calf tenderness  Neuro: AAOx3, no sensory/motor deficits, CN 2-12 intact. No speech slurring, pronator drift, facial asymmetry. normal finger-to-nose b/l. 5/5 strength throughout. normal gait. negative romberg. PHYSICAL EXAM:    GENERAL: Alert, appears stated age, well appearing, non-toxic  SKIN: Warm, pink and dry. MMM.   HEAD: NC, AT, no step offs   EYE: Normal lids/conjunctiva, PERRL, EOMI  ENT: Normal hearing, patent oropharynx without erythema or exudate,   NECK: +supple. No meningismus, or JVD,  Pulm: Bilateral BS, normal resp effort, no wheezes, stridor, or retractions  CV: RRR, no M/R/G, 2+and = radial pulses  Abd: soft, non-tender, non-distended, no hepatosplenomegaly. no CVA tenderness.   Mskel: no erythema, cyanosis, edema. no calf tenderness  Neuro: AAOx3, no sensory/motor deficits, CN 2-12 intact. No speech slurring, pronator drift, facial asymmetry. normal finger-to-nose b/l. 5/5 strength throughout. normal gait. negative romberg.    PELVIC: performed via speculum exam by NP fellow Lizette Feng, with Female Chaperone (myself MASHA pearson). no external vulvar lesions/rashes or abnormalities. cervical os closed. + 1 long string noted from cervix. mild vaginal bleeding in vault. no mucopurulent discharge. Bimanual exam: + CMT, negative adnexal ttp.

## 2025-01-03 NOTE — ED ADULT NURSE REASSESSMENT NOTE - PAIN RATING/NUMBER SCALE (0-10): ACTIVITY
[FreeTextEntry1] : Impression: #Dyspepsia - vague minor symptoms. EGD was normal other than mod-sev chronic inactive gastritis. Suspect largely driven by visceral hypersensitivity, but very mild.  #CRC Screening - Repeat in 10 years  Plan: - Discussed possible etiologies of sx at length. Pt is reassured by our discussion. Given mild nature of sx and negative testing, would not pursue significant further testing or treatment. - PPI courses as needed. - Increase fiber in diet. Add fiber supplement. - F/u as needed. - Colonoscopy 10 years. - PCP referral provided.  0 (no pain/absence of nonverbal indicators of pain)

## 2025-01-03 NOTE — ED PROVIDER NOTE - OBJECTIVE STATEMENT
23 yo ,  in 2024, Mirena IUD placement 1 mo ago, Type 1 DM on insulin pump, hx whipple surgery, anemia (received 2 blood tranfusions during post partum period, on iron infusions, last dose 1 month ago), presents with complaint of vaginal bleeding and pelvic pain x3 days. Patient endorsing occasional lightheadedness. Patient states on  she was pulling out a tampon and pulled out a single string from the IUD. Patient had immediate pelvic pain and cramping, with associated bleeding saturating 1 pad per hour. Patient states she was seen by [facility], her [facility], who performed US and informed her that the IUD was displaced and needed surgery. Patient was seen by [facility] today and was told that she needed a pelvic MRI. Additionally, patient endorsing poor blood sugar control, per dexcom patient sugars average in the 300's on a typical basis, has been unable to see her endocrinologist for pump adjustment.  Denies recent illness, headache, blurry vision, SOB, chest pain, n/v/d, or urinary complaints. 23 yo ,  in 2024, Mirena IUD placement 1 mo ago, Type 1 DM on insulin pump, hx whipple surgery, anemia (received 2 blood tranfusions during post partum period, on iron infusions, last dose 1 month ago), presents with complaint of vaginal bleeding and pelvic pain x3 days. Patient endorsing occasional lightheadedness. Patient states on  she was pulling out a tampon and pulled out a single string from the IUD. Patient had immediate pelvic pain and cramping, with associated bleeding saturating 1 pad per hour. Patient states she was seen by [facility], her [place] [facility], who performed US and informed her that the IUD was displaced and needed surgery. Patient was seen by [facility] today and was told that she needed a pelvic MRI. Additionally, patient endorsing poor blood sugar control, per dexcom patient sugars average in the 300's on a typical basis, has been unable to see her endocrinologist for pump adjustment.  Denies recent illness, headache, blurry vision, SOB, chest pain, n/v/d, or urinary complaints.    Supervising Statement (INÉS Freitas PA-C): I have personally seen and examined this patient.  I have fully participated in the care of this patient. I have reviewed all pertinent clinical information, including history, physical exam, plan and the ACP Fellow's note and agree except as noted.  24yoF PMH DM type I on insulin pump, Anemia requiring blood transfusions (last one in july 1 month post partum), s/p whipple procedure, p/w IUD displaced and vaginal bleeding. pt states 4 days ago, she pulled out her tampon, tugged her iud string and it broke and had immediate pain and worsneing vaginal bleeding, soaking 1 pad/hour. saw [facility] showing displaced IUD, and advised surgical intervention and then [facility] whom recommending Pelvic MRI. pt denies cp, sob, fatigue, n/vd/c or fevers. moderate cramping. on exam, pt well appearing, NSR no murmurs, lungs cta, + pelvic ttp. spoke w/ GYN recommending labs, TVUS and will evaluate for likely surgical intervention depdneing on imaging.

## 2025-01-03 NOTE — ED ADULT NURSE NOTE - OBJECTIVE STATEMENT
Pt A&Ox4 to ED ambulatory from home with c/o vaginal bleeding x 3 days. Pt was seen outpatient by OBGYN and was advised her IUD is displaced and was advised to come to ED for further eval. Pt endorses abdominal pain as well. Pt has hx of DM1 with insulin pump on L arm. Dexicom of R arm. respirations even and unlabored. pt is postpartum. . pt states she has been saturating 1 pad per hour x 3 days. denies chest pain, dizziness, n/v/diarrhea, sob. no acute distress at this time. pending US. plan of care continued.

## 2025-01-03 NOTE — ED PROVIDER NOTE - SPECIALTY CARE
Render Risk Assessment In Note?: no Detail Level: Simple Comment: Onset in childhood , mostly in remission throughout adulthood except for small  patches here and there . 2023 severe scalp flare started. She also has a history of BEHCET’s DISEASE\\n2024 worsening of scalp psoriasis despite topical high strength steroids, clobetasol scalp solution. OB/GYN

## 2025-01-03 NOTE — ED ADULT NURSE REASSESSMENT NOTE - NS ED NURSE REASSESS COMMENT FT1
Pt pale, diaphoretic and hypoglycemic at this time. Pt states she pulled out IV and IUD because she wants to go home. Pt placed into stretcher. FS 62 at this time. PO juice and crackers given. MASHA Edwards called to bedside for eval. Report given to night shift RN.

## 2025-01-04 LAB
CULTURE RESULTS: SIGNIFICANT CHANGE UP
SPECIMEN SOURCE: SIGNIFICANT CHANGE UP

## 2025-01-07 ENCOUNTER — TRANSCRIPTION ENCOUNTER (OUTPATIENT)
Age: 25
End: 2025-01-07

## 2025-01-24 ENCOUNTER — EMERGENCY (EMERGENCY)
Facility: HOSPITAL | Age: 25
LOS: 1 days | Discharge: HOME | End: 2025-01-24
Attending: STUDENT IN AN ORGANIZED HEALTH CARE EDUCATION/TRAINING PROGRAM
Payer: COMMERCIAL

## 2025-01-24 VITALS
RESPIRATION RATE: 18 BRPM | SYSTOLIC BLOOD PRESSURE: 110 MMHG | DIASTOLIC BLOOD PRESSURE: 75 MMHG | HEART RATE: 84 BPM | OXYGEN SATURATION: 100 %

## 2025-01-24 VITALS
DIASTOLIC BLOOD PRESSURE: 71 MMHG | SYSTOLIC BLOOD PRESSURE: 118 MMHG | TEMPERATURE: 98 F | RESPIRATION RATE: 17 BRPM | OXYGEN SATURATION: 100 % | HEART RATE: 79 BPM | HEIGHT: 66 IN | WEIGHT: 164.91 LBS

## 2025-01-24 DIAGNOSIS — Z90.89 ACQUIRED ABSENCE OF OTHER ORGANS: Chronic | ICD-10-CM

## 2025-01-24 DIAGNOSIS — Z90.410 ACQUIRED TOTAL ABSENCE OF PANCREAS: Chronic | ICD-10-CM

## 2025-01-24 DIAGNOSIS — Z98.890 OTHER SPECIFIED POSTPROCEDURAL STATES: Chronic | ICD-10-CM

## 2025-01-24 LAB
A1C WITH ESTIMATED AVERAGE GLUCOSE RESULT: 7.6 % — HIGH (ref 4–5.6)
ACETONE SERPL-MCNC: NEGATIVE — SIGNIFICANT CHANGE UP
ALBUMIN SERPL ELPH-MCNC: 3.7 G/DL — SIGNIFICANT CHANGE UP (ref 3.3–5.2)
ALP SERPL-CCNC: 79 U/L — SIGNIFICANT CHANGE UP (ref 40–120)
ALT FLD-CCNC: 10 U/L — SIGNIFICANT CHANGE UP
ANION GAP SERPL CALC-SCNC: 16 MMOL/L — SIGNIFICANT CHANGE UP (ref 5–17)
APPEARANCE UR: ABNORMAL
AST SERPL-CCNC: 16 U/L — SIGNIFICANT CHANGE UP
BACTERIA # UR AUTO: ABNORMAL /HPF
BASOPHILS # BLD AUTO: 0.02 K/UL — SIGNIFICANT CHANGE UP (ref 0–0.2)
BASOPHILS NFR BLD AUTO: 0.3 % — SIGNIFICANT CHANGE UP (ref 0–2)
BILIRUB SERPL-MCNC: 0.6 MG/DL — SIGNIFICANT CHANGE UP (ref 0.4–2)
BILIRUB UR-MCNC: NEGATIVE — SIGNIFICANT CHANGE UP
BUN SERPL-MCNC: 13.6 MG/DL — SIGNIFICANT CHANGE UP (ref 8–20)
CALCIUM SERPL-MCNC: 8.8 MG/DL — SIGNIFICANT CHANGE UP (ref 8.4–10.5)
CAST: 3 /LPF — SIGNIFICANT CHANGE UP (ref 0–4)
CHLORIDE SERPL-SCNC: 99 MMOL/L — SIGNIFICANT CHANGE UP (ref 96–108)
CO2 SERPL-SCNC: 20 MMOL/L — LOW (ref 22–29)
COLOR SPEC: YELLOW — SIGNIFICANT CHANGE UP
CREAT SERPL-MCNC: 0.47 MG/DL — LOW (ref 0.5–1.3)
DIFF PNL FLD: NEGATIVE — SIGNIFICANT CHANGE UP
EGFR: 135 ML/MIN/1.73M2 — SIGNIFICANT CHANGE UP
EOSINOPHIL # BLD AUTO: 0.04 K/UL — SIGNIFICANT CHANGE UP (ref 0–0.5)
EOSINOPHIL NFR BLD AUTO: 0.5 % — SIGNIFICANT CHANGE UP (ref 0–6)
ESTIMATED AVERAGE GLUCOSE: 171 MG/DL — HIGH (ref 68–114)
GAS PNL BLDV: SIGNIFICANT CHANGE UP
GLUCOSE SERPL-MCNC: 181 MG/DL — HIGH (ref 70–99)
GLUCOSE UR QL: 250 MG/DL
HCG SERPL-ACNC: <4 MIU/ML — SIGNIFICANT CHANGE UP
HCT VFR BLD CALC: 32.2 % — LOW (ref 34.5–45)
HGB BLD-MCNC: 10.9 G/DL — LOW (ref 11.5–15.5)
IMM GRANULOCYTES NFR BLD AUTO: 0.1 % — SIGNIFICANT CHANGE UP (ref 0–0.9)
KETONES UR-MCNC: NEGATIVE MG/DL — SIGNIFICANT CHANGE UP
LEUKOCYTE ESTERASE UR-ACNC: NEGATIVE — SIGNIFICANT CHANGE UP
LYMPHOCYTES # BLD AUTO: 2.01 K/UL — SIGNIFICANT CHANGE UP (ref 1–3.3)
LYMPHOCYTES # BLD AUTO: 25.4 % — SIGNIFICANT CHANGE UP (ref 13–44)
MCHC RBC-ENTMCNC: 31.6 PG — SIGNIFICANT CHANGE UP (ref 27–34)
MCHC RBC-ENTMCNC: 33.9 G/DL — SIGNIFICANT CHANGE UP (ref 32–36)
MCV RBC AUTO: 93.3 FL — SIGNIFICANT CHANGE UP (ref 80–100)
MONOCYTES # BLD AUTO: 0.82 K/UL — SIGNIFICANT CHANGE UP (ref 0–0.9)
MONOCYTES NFR BLD AUTO: 10.4 % — SIGNIFICANT CHANGE UP (ref 2–14)
NEUTROPHILS # BLD AUTO: 5.02 K/UL — SIGNIFICANT CHANGE UP (ref 1.8–7.4)
NEUTROPHILS NFR BLD AUTO: 63.3 % — SIGNIFICANT CHANGE UP (ref 43–77)
NITRITE UR-MCNC: NEGATIVE — SIGNIFICANT CHANGE UP
PH UR: 6.5 — SIGNIFICANT CHANGE UP (ref 5–8)
PLATELET # BLD AUTO: 288 K/UL — SIGNIFICANT CHANGE UP (ref 150–400)
POTASSIUM SERPL-MCNC: 3.7 MMOL/L — SIGNIFICANT CHANGE UP (ref 3.5–5.3)
POTASSIUM SERPL-SCNC: 3.7 MMOL/L — SIGNIFICANT CHANGE UP (ref 3.5–5.3)
PROT SERPL-MCNC: 6.3 G/DL — LOW (ref 6.6–8.7)
PROT UR-MCNC: NEGATIVE MG/DL — SIGNIFICANT CHANGE UP
RBC # BLD: 3.45 M/UL — LOW (ref 3.8–5.2)
RBC # FLD: 11.2 % — SIGNIFICANT CHANGE UP (ref 10.3–14.5)
RBC CASTS # UR COMP ASSIST: 0 /HPF — SIGNIFICANT CHANGE UP (ref 0–4)
SODIUM SERPL-SCNC: 135 MMOL/L — SIGNIFICANT CHANGE UP (ref 135–145)
SP GR SPEC: 1.02 — SIGNIFICANT CHANGE UP (ref 1–1.03)
SQUAMOUS # UR AUTO: 12 /HPF — HIGH (ref 0–5)
UROBILINOGEN FLD QL: 0.2 MG/DL — SIGNIFICANT CHANGE UP (ref 0.2–1)
WBC # BLD: 7.92 K/UL — SIGNIFICANT CHANGE UP (ref 3.8–10.5)
WBC # FLD AUTO: 7.92 K/UL — SIGNIFICANT CHANGE UP (ref 3.8–10.5)
WBC UR QL: 7 /HPF — HIGH (ref 0–5)

## 2025-01-24 PROCEDURE — 83036 HEMOGLOBIN GLYCOSYLATED A1C: CPT

## 2025-01-24 PROCEDURE — 36415 COLL VENOUS BLD VENIPUNCTURE: CPT

## 2025-01-24 PROCEDURE — 85018 HEMOGLOBIN: CPT

## 2025-01-24 PROCEDURE — 82803 BLOOD GASES ANY COMBINATION: CPT

## 2025-01-24 PROCEDURE — 85025 COMPLETE CBC W/AUTO DIFF WBC: CPT

## 2025-01-24 PROCEDURE — 96375 TX/PRO/DX INJ NEW DRUG ADDON: CPT

## 2025-01-24 PROCEDURE — 99053 MED SERV 10PM-8AM 24 HR FAC: CPT

## 2025-01-24 PROCEDURE — 84295 ASSAY OF SERUM SODIUM: CPT

## 2025-01-24 PROCEDURE — 82962 GLUCOSE BLOOD TEST: CPT

## 2025-01-24 PROCEDURE — 99285 EMERGENCY DEPT VISIT HI MDM: CPT

## 2025-01-24 PROCEDURE — 99284 EMERGENCY DEPT VISIT MOD MDM: CPT | Mod: 25

## 2025-01-24 PROCEDURE — 84132 ASSAY OF SERUM POTASSIUM: CPT

## 2025-01-24 PROCEDURE — 85014 HEMATOCRIT: CPT

## 2025-01-24 PROCEDURE — 96376 TX/PRO/DX INJ SAME DRUG ADON: CPT

## 2025-01-24 PROCEDURE — 82947 ASSAY GLUCOSE BLOOD QUANT: CPT

## 2025-01-24 PROCEDURE — 96374 THER/PROPH/DIAG INJ IV PUSH: CPT

## 2025-01-24 PROCEDURE — 82009 KETONE BODYS QUAL: CPT

## 2025-01-24 PROCEDURE — 83605 ASSAY OF LACTIC ACID: CPT

## 2025-01-24 PROCEDURE — 84702 CHORIONIC GONADOTROPIN TEST: CPT

## 2025-01-24 PROCEDURE — 82435 ASSAY OF BLOOD CHLORIDE: CPT

## 2025-01-24 PROCEDURE — 80053 COMPREHEN METABOLIC PANEL: CPT

## 2025-01-24 PROCEDURE — 81001 URINALYSIS AUTO W/SCOPE: CPT

## 2025-01-24 PROCEDURE — 82330 ASSAY OF CALCIUM: CPT

## 2025-01-24 PROCEDURE — 87086 URINE CULTURE/COLONY COUNT: CPT

## 2025-01-24 RX ORDER — INSULIN ASPART 100/ML
1 VIAL (ML) SUBCUTANEOUS
Qty: 1 | Refills: 0
Start: 2025-01-24

## 2025-01-24 RX ORDER — DEXTROSE MONOHYDRATE 25 G/50ML
50 INJECTION, SOLUTION INTRAVENOUS ONCE
Refills: 0 | Status: COMPLETED | OUTPATIENT
Start: 2025-01-24 | End: 2025-01-24

## 2025-01-24 RX ORDER — ONDANSETRON 4 MG/1
4 TABLET ORAL ONCE
Refills: 0 | Status: COMPLETED | OUTPATIENT
Start: 2025-01-24 | End: 2025-01-24

## 2025-01-24 RX ORDER — DEXTROSE MONOHYDRATE 25 G/50ML
25 INJECTION, SOLUTION INTRAVENOUS ONCE
Refills: 0 | Status: COMPLETED | OUTPATIENT
Start: 2025-01-24 | End: 2025-01-24

## 2025-01-24 RX ADMIN — DEXTROSE MONOHYDRATE 25 MILLILITER(S): 25 INJECTION, SOLUTION INTRAVENOUS at 05:36

## 2025-01-24 RX ADMIN — ONDANSETRON 4 MILLIGRAM(S): 4 TABLET ORAL at 06:47

## 2025-01-24 RX ADMIN — DEXTROSE MONOHYDRATE 50 MILLILITER(S): 25 INJECTION, SOLUTION INTRAVENOUS at 06:19

## 2025-01-24 NOTE — ED PROVIDER NOTE - CLINICAL SUMMARY MEDICAL DECISION MAKING FREE TEXT BOX
24y F w/ hx Whipple surgery, DM1; presents for hypoglycemia in the setting of decreased PO intake. Was given 1 amp D50 prior to arrival by EMS. Pt with initial FS of 63 on arrival; subsequently dropped to 44. Given additional 1 amp D50 and encouraged to eat. Will check serial fingersticks, labs, consult endocrinology.

## 2025-01-24 NOTE — ED ADULT NURSE NOTE - CHIEF COMPLAINT QUOTE
BIBA for hypoglycemia. EMS reports giving 1 amp of dextrose. Patient reports she was told to come here because there was endo on call to fill her prescription which she states she is out of. Denies additional complaints @ this time. FS 63 during triage.
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
## 2025-01-24 NOTE — ED ADULT TRIAGE NOTE - CHIEF COMPLAINT QUOTE
BIBA for hypoglycemia. EMS reports giving 1 amp of dextrose. Patient reports she was told to come here because there was endo on call to fill her prescription which she states she is out of. Denies additional complaints @ this time. FS normal during triage. BIBA for hypoglycemia. EMS reports giving 1 amp of dextrose. Patient reports she was told to come here because there was endo on call to fill her prescription which she states she is out of. Denies additional complaints @ this time. FS 63 during triage.

## 2025-01-24 NOTE — ED PROVIDER NOTE - NSFOLLOWUPINSTRUCTIONS_ED_ALL_ED_FT
Hypoglycemia in a Person with Diabetes    WHAT YOU NEED TO KNOW:    What is hypoglycemia? Hypoglycemia is a serious condition that happens when your blood glucose (sugar) level drops too low. The blood sugar level is usually too high in a person with diabetes, but the level can also drop too low. It is important to follow your diabetes management plan to keep your blood sugar level steady.    What increases my risk for hypoglycemia?    Binge eating, eating large amounts of carbohydrates in processed foods such as potato chips    A missed meal, or a meal eaten later than usual    Vomiting    Certain medicines, including insulin or other diabetes medicine    More exercise than usual, without extra food    Alcohol use    Pregnancy, older age    Decreased liver or kidney function    Not knowing your symptoms are symptoms of hypoglycemia  What are the signs and symptoms of hypoglycemia?    Headache, hunger, or shakiness    Trouble thinking or moodiness    Sweating, or a pounding heartbeat    Forgetfulness, confusion, or double vision    Weakness or trouble walking    Numbness and tingling around your mouth    Seizures, coma, or loss of consciousness  How do I check my blood sugar level? You will be given information on when and how to check. You will learn what your blood sugar level should be and what to do if it is too low. Write down the times of your checks and your levels. Take them to all follow-up appointments.    A glucose meter is a device that uses a test strip to check the level. You put a small drop of blood from a finger on the test strip. The strip is put into the device. The device then figures out how much sugar is in your blood.      A continuous glucose monitor (CGM) uses a sensor to check the level. The sensor is placed on your abdomen or arm. A transmitter on the sensor gets a glucose reading. CGM data may be linked to an insulin pump.    How do I manage hypoglycemia? Always carefully follow your healthcare provider's instructions on how to treat low blood sugar levels. The following are general guidelines:    Check your blood sugar level right away if you have symptoms of hypoglycemia. Hypoglycemia usually happens when your blood sugar level is 70 mg/dL or below. Ask your diabetes care team provider what blood sugar level is too low for you.    If your blood sugar level is too low, eat or drink 15 grams of fast-acting carbohydrate. Examples are 4 ounces (½ cup) of fruit juice or regular soft drink, or 2 tablespoons of raisins. You may instead use 1 tube of glucose gel. Check your blood sugar level 15 minutes later. Sit still as you wait. If the level is still low (less than 100 mg/dL), have another 15 grams of carbohydrate. When the level returns to 100 mg/dL, eat a meal if it is time. If your meal time is more than 1 hour away, eat a snack. This will help prevent another drop in blood sugar. The snack should contain carbohydrates, such as the following:  ¾ cup of cereal    1 cup of skim or low fat milk    6 soda crackers    ½ of a turkey sandwich    15 fat-free chips    Ways to Raise Your Blood Sugar    Always carry a source of fast-acting carbohydrate. If you have symptoms of hypoglycemia and you do not have a blood glucose meter, have a source of fast-acting carbohydrate anyway. Avoid carbohydrate foods that are high in fat. The fat content may make the carbohydrate take longer to increase your blood sugar level. Ask your provider if you should carry a glucagon kit. Glucagon is a medicine that is injected when you develop severe hypoglycemia and become unconscious. Check the expiration date every month and replace it before it expires.    Teach others how to help you if you have symptoms of hypoglycemia. Tell them about the symptoms of hypoglycemia. Ask them to give you a source of fast-acting carbohydrate if you cannot get it yourself. Ask them to give you a glucagon injection if you have signs of hypoglycemia and you become unconscious or have a seizure. Ask them to call the local emergency number (911 in the [place]). This is an emergency. Tell them never to try to make you swallow anything if you faint or have a seizure.    Wear medical alert jewelry or carry a card that says you have diabetes. Ask where to get these items.    How do I prevent hypoglycemia?    Check your blood sugar level as directed. You may need to check the level at least 3 times each day. Ask when and how often to check. Ask your healthcare provider what your blood sugar levels should be before and after you eat. Record your blood sugar level results and take the record with you when you see your care team. They may use it to make changes to your medicine, food, or exercise schedules.    Take diabetes medicine as directed. Do not double the amount of medicine you take unless instructed by your diabetes care team provider. You may need non-insulin diabetes medicine, insulin, or both to help control your blood sugar levels. Insulin may be given through a pump or pen, or injected. You and your care team will discuss the best method for you:    An insulin pump is a wearable medical device that gives continuous insulin. An insulin pump prevents the need for multiple insulin injections in a day.    An insulin pen is a device prefilled with insulin. Most insulin pens are disposable. You throw the pen away after it is empty or used for a certain amount of time. Some pens have a replaceable cartridge filled with insulin. You keep the pen and only throw away the cartridge.      Insulin injections are given with a needle and syringe. You and your family members will be taught how to draw up and give insulin if this is the best method for you. You will also be taught how to dispose of used needles and syringes.    Eat meals and snacks as directed. Talk to your dietitian or provider about a meal plan that is right for you. Do not skip meals.    Check your blood sugar level before you exercise. Physical activity, such as exercise, can decrease your blood sugar level. If your blood sugar level is less than 100 mg/dL, have a carbohydrate snack. Examples are 4 to 6 crackers, ½ banana, 8 ounces (1 cup) of nonfat or 1% milk, or 4 ounces (½ cup) of juice. If you will be active for more than 1 hour, you may need to check your blood sugar level every 30 minutes. Your provider may also recommend that you check your blood sugar level after your activity.    Know the risks if you choose to drink alcohol. Alcohol can cause your blood sugar levels to be low if you use insulin. Alcohol can cause high blood sugar levels and weight gain if you drink too much. Women 21 years or older and men 65 years or older should limit alcohol to 1 drink a day. Men aged 21 to 64 years should limit alcohol to 2 drinks a day. A drink of alcohol is 12 ounces of beer, 5 ounces of wine, or 1½ ounces of liquor.    Have someone call your local emergency number (911 in the [place]) if:    You have a seizure or pass out.    Your blood sugar is less than 50 mg/dL and does not respond to treatment.    You feel you are going to pass out.    You have trouble thinking clearly.  When should I call my doctor or diabetes care team provider?    You have had symptoms of low blood sugar several times.    You have questions about the amount of insulin or diabetes medicine you are taking.    You have questions or concerns about your condition or care.  CARE AGREEMENT:    You have the right to help plan your care. Learn about your health condition and how it may be treated. Discuss treatment options with your healthcare providers to decide what care you want to receive. You always have the right to refuse treatment.

## 2025-01-24 NOTE — ED ADULT NURSE NOTE - OBJECTIVE STATEMENT
assumed care of pt from JAYLON Freeman @ 1930, pt AAOX3, resp. even and unlabored on RA, pt NSR on CM, pt was hypoglycemic on arrival to ED last night, last FS 98, pt endorses she feels fine and would like to leave for work, MD made aware and at bedside with pt, safety maintained.

## 2025-01-24 NOTE — ED POST DISCHARGE NOTE - REASON FOR FOLLOW-UP
spoke to [facility] reports Lantus [facility] ML, was on concentrated dose in past, will attempt to contact patient to confirm medication Other

## 2025-01-24 NOTE — ED PROVIDER NOTE - OBJECTIVE STATEMENT
24y F w/ hx pancreatic tumor s/p Whipple and splenectomy, now with DM1; presents for hypoglycemia. Pt says she was previously following with endocrinologist and has been using Omnipod insulin pump. Says the pump just broke few days ago. Blood sugar was initially running high; says she was going to  new Novolog prescription but couldn't afford it (still has 1 vial left at home). Was following with endocrinologist out of [facility] is looking for [facility] affiliated endocrinologist. Pt was working last night and did not eat for 12 hours. Noted blood sugar to low prior to arrival; tried eating/drinking but it was still low. Was given 1 amp D50 by EMS. Endorsing nausea. No recent illness, fever, abdominal pain. 24y F w/ hx pancreatic tumor s/p Whipple and splenectomy, now with DM1; presents for hypoglycemia. Pt says she was previously following with endocrinologist and has been using Omnipod insulin pump (Novolog). Says she has been having some issues with the pump over the past few days, and that blood sugar was initially running high. Also says she was going to  new Novolog prescription but couldn't afford it (still has 1 vial left at home). Was following with endocrinologist out of [facility] is looking for [facility] affiliated endocrinologist (has appt scheduled in a few weeks). Pt was working last night and did not eat for 12 hours. Noted blood sugar to low prior to arrival; tried eating/drinking but it was still low. Was given 1 amp D50 by EMS. Endorsing nausea. No recent illness, fever, abdominal pain.

## 2025-01-24 NOTE — ED PROVIDER NOTE - CARE PROVIDER_API CALL
Hemant Diaz  Endocrinology/Metab/Diabetes  [address] [place] [id number]  Phone: [phone]  Fax: [phone]  Follow Up Time:

## 2025-01-25 LAB
CULTURE RESULTS: SIGNIFICANT CHANGE UP
SPECIMEN SOURCE: SIGNIFICANT CHANGE UP

## 2025-01-27 ENCOUNTER — TRANSCRIPTION ENCOUNTER (OUTPATIENT)
Age: 25
End: 2025-01-27

## 2025-02-03 ENCOUNTER — APPOINTMENT (OUTPATIENT)
Dept: FAMILY MEDICINE | Facility: CLINIC | Age: 25
End: 2025-02-03
Payer: COMMERCIAL

## 2025-02-03 VITALS
HEIGHT: 65 IN | BODY MASS INDEX: 29.16 KG/M2 | WEIGHT: 175 LBS | SYSTOLIC BLOOD PRESSURE: 110 MMHG | RESPIRATION RATE: 14 BRPM | HEART RATE: 82 BPM | DIASTOLIC BLOOD PRESSURE: 72 MMHG | TEMPERATURE: 98.9 F | OXYGEN SATURATION: 98 %

## 2025-02-03 DIAGNOSIS — F90.9 ATTENTION-DEFICIT HYPERACTIVITY DISORDER, UNSPECIFIED TYPE: ICD-10-CM

## 2025-02-03 DIAGNOSIS — D3A.8 OTHER BENIGN NEUROENDOCRINE TUMORS: ICD-10-CM

## 2025-02-03 PROCEDURE — 99214 OFFICE O/P EST MOD 30 MIN: CPT

## 2025-02-03 PROCEDURE — G2211 COMPLEX E/M VISIT ADD ON: CPT

## 2025-02-03 RX ORDER — INSULIN ASPART 100 [IU]/ML
100 INJECTION, SOLUTION INTRAVENOUS; SUBCUTANEOUS
Qty: 1 | Refills: 2 | Status: ACTIVE | COMMUNITY
Start: 2025-02-03 | End: 1900-01-01

## 2025-02-03 RX ORDER — INSULIN ASPART 100 [IU]/ML
0 INJECTION, SOLUTION INTRAVENOUS; SUBCUTANEOUS
Qty: 0 | Refills: 2 | DISCHARGE
Start: 2025-02-03

## 2025-02-04 ENCOUNTER — INPATIENT (INPATIENT)
Facility: HOSPITAL | Age: 25
LOS: 0 days | Discharge: LEFT BEFORE DISCHARGE | DRG: 639 | End: 2025-02-05
Attending: HOSPITALIST | Admitting: HOSPITALIST
Payer: COMMERCIAL

## 2025-02-04 VITALS
HEIGHT: 66 IN | OXYGEN SATURATION: 99 % | DIASTOLIC BLOOD PRESSURE: 82 MMHG | TEMPERATURE: 98 F | SYSTOLIC BLOOD PRESSURE: 132 MMHG | HEART RATE: 87 BPM | RESPIRATION RATE: 18 BRPM

## 2025-02-04 DIAGNOSIS — Z98.890 OTHER SPECIFIED POSTPROCEDURAL STATES: Chronic | ICD-10-CM

## 2025-02-04 DIAGNOSIS — Z90.89 ACQUIRED ABSENCE OF OTHER ORGANS: Chronic | ICD-10-CM

## 2025-02-04 DIAGNOSIS — Z90.410 ACQUIRED TOTAL ABSENCE OF PANCREAS: Chronic | ICD-10-CM

## 2025-02-04 LAB
ALBUMIN SERPL ELPH-MCNC: 3.9 G/DL — SIGNIFICANT CHANGE UP (ref 3.3–5.2)
ALP SERPL-CCNC: 79 U/L — SIGNIFICANT CHANGE UP (ref 40–120)
ALT FLD-CCNC: 11 U/L — SIGNIFICANT CHANGE UP
ANION GAP SERPL CALC-SCNC: 13 MMOL/L — SIGNIFICANT CHANGE UP (ref 5–17)
AST SERPL-CCNC: 22 U/L — SIGNIFICANT CHANGE UP
BASOPHILS # BLD AUTO: 0.02 K/UL — SIGNIFICANT CHANGE UP (ref 0–0.2)
BASOPHILS NFR BLD AUTO: 0.3 % — SIGNIFICANT CHANGE UP (ref 0–2)
BILIRUB SERPL-MCNC: 0.4 MG/DL — SIGNIFICANT CHANGE UP (ref 0.4–2)
BUN SERPL-MCNC: 18.1 MG/DL — SIGNIFICANT CHANGE UP (ref 8–20)
CALCIUM SERPL-MCNC: 8.9 MG/DL — SIGNIFICANT CHANGE UP (ref 8.4–10.5)
CHLORIDE SERPL-SCNC: 98 MMOL/L — SIGNIFICANT CHANGE UP (ref 96–108)
CO2 SERPL-SCNC: 26 MMOL/L — SIGNIFICANT CHANGE UP (ref 22–29)
CREAT SERPL-MCNC: 0.56 MG/DL — SIGNIFICANT CHANGE UP (ref 0.5–1.3)
EGFR: 130 ML/MIN/1.73M2 — SIGNIFICANT CHANGE UP
EOSINOPHIL # BLD AUTO: 0.05 K/UL — SIGNIFICANT CHANGE UP (ref 0–0.5)
EOSINOPHIL NFR BLD AUTO: 0.7 % — SIGNIFICANT CHANGE UP (ref 0–6)
GLUCOSE SERPL-MCNC: 37 MG/DL — CRITICAL LOW (ref 70–99)
HCG SERPL-ACNC: <4 MIU/ML — SIGNIFICANT CHANGE UP
HCT VFR BLD CALC: 36.2 % — SIGNIFICANT CHANGE UP (ref 34.5–45)
HGB BLD-MCNC: 12.1 G/DL — SIGNIFICANT CHANGE UP (ref 11.5–15.5)
IMM GRANULOCYTES NFR BLD AUTO: 0.1 % — SIGNIFICANT CHANGE UP (ref 0–0.9)
LYMPHOCYTES # BLD AUTO: 2.02 K/UL — SIGNIFICANT CHANGE UP (ref 1–3.3)
LYMPHOCYTES # BLD AUTO: 27.3 % — SIGNIFICANT CHANGE UP (ref 13–44)
MCHC RBC-ENTMCNC: 31.5 PG — SIGNIFICANT CHANGE UP (ref 27–34)
MCHC RBC-ENTMCNC: 33.4 G/DL — SIGNIFICANT CHANGE UP (ref 32–36)
MCV RBC AUTO: 94.3 FL — SIGNIFICANT CHANGE UP (ref 80–100)
MONOCYTES # BLD AUTO: 0.8 K/UL — SIGNIFICANT CHANGE UP (ref 0–0.9)
MONOCYTES NFR BLD AUTO: 10.8 % — SIGNIFICANT CHANGE UP (ref 2–14)
NEUTROPHILS # BLD AUTO: 4.51 K/UL — SIGNIFICANT CHANGE UP (ref 1.8–7.4)
NEUTROPHILS NFR BLD AUTO: 60.8 % — SIGNIFICANT CHANGE UP (ref 43–77)
PLATELET # BLD AUTO: 311 K/UL — SIGNIFICANT CHANGE UP (ref 150–400)
POTASSIUM SERPL-MCNC: 3.8 MMOL/L — SIGNIFICANT CHANGE UP (ref 3.5–5.3)
POTASSIUM SERPL-SCNC: 3.8 MMOL/L — SIGNIFICANT CHANGE UP (ref 3.5–5.3)
PROT SERPL-MCNC: 7 G/DL — SIGNIFICANT CHANGE UP (ref 6.6–8.7)
RAPID RVP RESULT: SIGNIFICANT CHANGE UP
RBC # BLD: 3.84 M/UL — SIGNIFICANT CHANGE UP (ref 3.8–5.2)
RBC # FLD: 11.3 % — SIGNIFICANT CHANGE UP (ref 10.3–14.5)
SARS-COV-2 RNA SPEC QL NAA+PROBE: SIGNIFICANT CHANGE UP
SODIUM SERPL-SCNC: 137 MMOL/L — SIGNIFICANT CHANGE UP (ref 135–145)
WBC # BLD: 7.41 K/UL — SIGNIFICANT CHANGE UP (ref 3.8–10.5)
WBC # FLD AUTO: 7.41 K/UL — SIGNIFICANT CHANGE UP (ref 3.8–10.5)

## 2025-02-04 PROCEDURE — 71045 X-RAY EXAM CHEST 1 VIEW: CPT | Mod: 26

## 2025-02-04 PROCEDURE — 99285 EMERGENCY DEPT VISIT HI MDM: CPT

## 2025-02-04 RX ORDER — DM/PSEUDOEPHED/ACETAMINOPHEN 10-30-250
50 CAPSULE ORAL ONCE
Refills: 0 | Status: COMPLETED | OUTPATIENT
Start: 2025-02-04 | End: 2025-02-04

## 2025-02-04 RX ADMIN — Medication 50 MILLILITER(S): at 20:33

## 2025-02-04 NOTE — ED PROVIDER NOTE - OBJECTIVE STATEMENT
24 year old female with PMHx insulinoma s/p Whipple and splenectomy (2021), now with DM1 (has been using Omnipod insulin pump - Novolog) presenting with hypoglycemia. Patient states was woken up at 0300 yesterday by her monitor due to low sugars, she had some juice and candy, went back to sleep, was woken up at 0500 by her  who found her drenched in sweat and confused. sugar was in 50s, he administered glucagon with improvement in mental status and sugars, and she went back to sleep. States she deactivated her Omnipod at 0300 after first alarm but throughout the day her sugars have been fluctuating despite deactivating her Omnipod. States she had lasagna for breakfast, has had very large meals with protein and carbohydrates throughout the day such as a foot long sub but her sugars continue to drop. States she does not take any other medications. Denies recent fevers, cough, chills, other concerns. Has been trying to obtain urgent visit with endo outpatient, has appointment for 3/10. 24 year old female with PMHx insulinoma s/p Whipple and splenectomy (2021), now with diabetes type 3c (has been using Omnipod insulin pump - Novolog) presenting with hypoglycemia. Patient states was woken up at 0300 yesterday by her monitor due to low sugars, she had some juice and candy, went back to sleep, was woken up at 0500 by her  who found her drenched in sweat and confused. sugar was in 50s, he administered glucagon with improvement in mental status and sugars, and she went back to sleep. States she deactivated her Omnipod at 0300 after first alarm but throughout the day her sugars have been fluctuating despite deactivating her Omnipod. States she had lasagna for breakfast, has had very large meals with protein and carbohydrates throughout the day such as a foot long sub but her sugars continue to drop. States she does not take any other medications. Denies recent fevers, cough, chills, other concerns. Has been trying to obtain urgent visit with endo outpatient, has appointment for 3/10.

## 2025-02-04 NOTE — ED PROVIDER NOTE - PROGRESS NOTE DETAILS
Torin: Pt received in signout from Dr. Cunha. Fingerstick initially improved to 180s after receiving 1 amp D50 and eating juice/cookies; however now dropped back down to 74. Pt says that she has not received any insulin since 3 AM yesterday. Says that blood sugar has been fluctuating over the past week, going as high as 600s but now again hypoglycemic. Says sugars were similarly uncontrolled when she was diagnosed with insulinoma. Will place endocrine consult and admit.

## 2025-02-04 NOTE — ED PROVIDER NOTE - ATTENDING CONTRIBUTION TO CARE
24-year-old female type I diabetic has a OmniPod insulin pump on NovoLog, has a glucometer karolina on her phone which shows that she has had multiple episodes of hypo a glycemia today.  She is status post Whipple for insulinoma, and notes that since that time she has had episodes of hypoglycemia before but mostly when she has had infections never when she is feeling otherwise okay.  States that she does not have any fevers or chills but just has been low energy for the past few days which she attributes to the hypoglycemia.  Had a blood sugar as low as in the 20s this morning, recurrent episodes going down to the 40s and 50s multiple times today.  Here is tolerating p.o., getting juice and some cookies with her blood sugar monitor reading in the 150s to 200s now.  Patient has been trying to get outpatient follow-up with endocrinology but notes she does not have an appointment until March 10 in approximately 4.5 weeks.  Will obtain workup to eval for possible infection given recurrent episodes of hypoglycemia, labs and chest x-ray, dispo pending clinical course and results.

## 2025-02-04 NOTE — ED PROVIDER NOTE - PHYSICAL EXAMINATION
Gen: well appearing, no acute distress  Head: normocephalic, atraumatic  EENT: EOMI, moist mucous membranes  Lung: no increased work of breathing, clear to auscultation bilaterally, speaking in full sentences without distress  CV: regular rate, regular rhythm, normal s1/s2, 2+ radial pulses bilaterally  Abd: soft, non-tender, non-distended,  no CVA tenderness  MSK: No edema, no visible deformities, full range of motion in all 4 extremities  Neuro: Awake, alert, clear speech, no facial asymmetry, moving all extremities against gravity  Skin: No obvious rash, no jaundice  Psych: calm, cooperative

## 2025-02-04 NOTE — ED ADULT NURSE NOTE - OBJECTIVE STATEMENT
Pt presents to the ed c/o generalized weakness/malaise/hypoglycemia. Pt reports blood glucose of 22 @ home. Pt ax4, reports that she has an appointment with endocrinology on March 10th, but could not wait until then. Vitals as documented, Blood glucose 43 on arrival @ [facility]. Pt brought to critical care for further evaluation.

## 2025-02-04 NOTE — ED PROVIDER NOTE - CLINICAL SUMMARY MEDICAL DECISION MAKING FREE TEXT BOX
24 year old female with hx insulinoma s/p whipple 2021, now with IDDM presenting with many episodes of hypoglycemia today despite turning off her omnipod/novolog today at 0300 and eating carb/protein rich meals. No reported infectious stigmata. FS 43 on arrival, treated with dextrose. Plan encourage PO, check labs, q1 FS to start, reassess. 24 year old female with hx insulinoma s/p whipple 2021, now with IDDM presenting with many episodes of hypoglycemia today despite turning off her omnipod/novolog today at 0300 and eating carb/protein rich meals. No reported infectious stigmata. FS 43 on arrival, treated with dextrose. We removed her Omnipod at bedside on initial assessment. Plan encourage PO, check labs, q1 FS to start, reassess.

## 2025-02-04 NOTE — ED ADULT NURSE NOTE - SUICIDE SCREENING DEPRESSION
The patient has been examined and the H&P has been reviewed:    I concur with the findings and no changes have occurred since H&P was written.    Anesthesia/Surgery risks, benefits and alternative options discussed and understood by patient/family.          Active Hospital Problems    Diagnosis  POA    Medial meniscus tear [S83.689A]  Yes      Resolved Hospital Problems   No resolved problems to display.      Negative

## 2025-02-04 NOTE — ED ADULT TRIAGE NOTE - CHIEF COMPLAINT QUOTE
Pt BIBEMS from home s/p hypoglycemia for past 3-4 days. Pt is T1DM and on insulin pump and states pump has been malfunctioning for a few days. Pt woke up with BG in 20's, 43 at time of triage. Pt moved to CC7 and staff made aware.

## 2025-02-05 VITALS
DIASTOLIC BLOOD PRESSURE: 63 MMHG | TEMPERATURE: 98 F | HEART RATE: 76 BPM | RESPIRATION RATE: 18 BRPM | SYSTOLIC BLOOD PRESSURE: 98 MMHG | OXYGEN SATURATION: 97 %

## 2025-02-05 DIAGNOSIS — E16.2 HYPOGLYCEMIA, UNSPECIFIED: ICD-10-CM

## 2025-02-05 LAB
ALBUMIN SERPL ELPH-MCNC: 3.9 G/DL — SIGNIFICANT CHANGE UP (ref 3.3–5.2)
ALP SERPL-CCNC: 83 U/L — SIGNIFICANT CHANGE UP (ref 40–120)
ALT FLD-CCNC: 11 U/L — SIGNIFICANT CHANGE UP
ANION GAP SERPL CALC-SCNC: 14 MMOL/L — SIGNIFICANT CHANGE UP (ref 5–17)
APPEARANCE UR: CLEAR — SIGNIFICANT CHANGE UP
AST SERPL-CCNC: 20 U/L — SIGNIFICANT CHANGE UP
BILIRUB SERPL-MCNC: 0.7 MG/DL — SIGNIFICANT CHANGE UP (ref 0.4–2)
BILIRUB UR-MCNC: NEGATIVE — SIGNIFICANT CHANGE UP
BUN SERPL-MCNC: 15.9 MG/DL — SIGNIFICANT CHANGE UP (ref 8–20)
C PEPTIDE SERPL-MCNC: <0.1 NG/ML — LOW (ref 1.1–4.4)
CALCIUM SERPL-MCNC: 8.5 MG/DL — SIGNIFICANT CHANGE UP (ref 8.4–10.5)
CHLORIDE SERPL-SCNC: 92 MMOL/L — LOW (ref 96–108)
CO2 SERPL-SCNC: 23 MMOL/L — SIGNIFICANT CHANGE UP (ref 22–29)
COLOR SPEC: YELLOW — SIGNIFICANT CHANGE UP
CREAT SERPL-MCNC: 0.85 MG/DL — SIGNIFICANT CHANGE UP (ref 0.5–1.3)
DIFF PNL FLD: NEGATIVE — SIGNIFICANT CHANGE UP
EGFR: 97 ML/MIN/1.73M2 — SIGNIFICANT CHANGE UP
EPI CELLS # UR: PRESENT
GLUCOSE BLDC GLUCOMTR-MCNC: 103 MG/DL — HIGH (ref 70–99)
GLUCOSE BLDC GLUCOMTR-MCNC: 142 MG/DL — HIGH (ref 70–99)
GLUCOSE BLDC GLUCOMTR-MCNC: 152 MG/DL — HIGH (ref 70–99)
GLUCOSE BLDC GLUCOMTR-MCNC: 163 MG/DL — HIGH (ref 70–99)
GLUCOSE BLDC GLUCOMTR-MCNC: 263 MG/DL — HIGH (ref 70–99)
GLUCOSE BLDC GLUCOMTR-MCNC: 287 MG/DL — HIGH (ref 70–99)
GLUCOSE BLDC GLUCOMTR-MCNC: 331 MG/DL — HIGH (ref 70–99)
GLUCOSE BLDC GLUCOMTR-MCNC: 333 MG/DL — HIGH (ref 70–99)
GLUCOSE BLDC GLUCOMTR-MCNC: 358 MG/DL — HIGH (ref 70–99)
GLUCOSE BLDC GLUCOMTR-MCNC: 424 MG/DL — HIGH (ref 70–99)
GLUCOSE BLDC GLUCOMTR-MCNC: 54 MG/DL — CRITICAL LOW (ref 70–99)
GLUCOSE BLDC GLUCOMTR-MCNC: 61 MG/DL — LOW (ref 70–99)
GLUCOSE BLDC GLUCOMTR-MCNC: 73 MG/DL — SIGNIFICANT CHANGE UP (ref 70–99)
GLUCOSE BLDC GLUCOMTR-MCNC: 74 MG/DL — SIGNIFICANT CHANGE UP (ref 70–99)
GLUCOSE BLDC GLUCOMTR-MCNC: 92 MG/DL — SIGNIFICANT CHANGE UP (ref 70–99)
GLUCOSE BLDC GLUCOMTR-MCNC: 94 MG/DL — SIGNIFICANT CHANGE UP (ref 70–99)
GLUCOSE SERPL-MCNC: 364 MG/DL — HIGH (ref 70–99)
GLUCOSE UR QL: 250 MG/DL
KETONES UR-MCNC: NEGATIVE MG/DL — SIGNIFICANT CHANGE UP
LEUKOCYTE ESTERASE UR-ACNC: NEGATIVE — SIGNIFICANT CHANGE UP
MAGNESIUM SERPL-MCNC: 1.5 MG/DL — LOW (ref 1.8–2.6)
NITRITE UR-MCNC: NEGATIVE — SIGNIFICANT CHANGE UP
PH UR: 6 — SIGNIFICANT CHANGE UP (ref 5–8)
PHOSPHATE SERPL-MCNC: 3.2 MG/DL — SIGNIFICANT CHANGE UP (ref 2.4–4.7)
POTASSIUM SERPL-MCNC: 4.5 MMOL/L — SIGNIFICANT CHANGE UP (ref 3.5–5.3)
POTASSIUM SERPL-SCNC: 4.5 MMOL/L — SIGNIFICANT CHANGE UP (ref 3.5–5.3)
PROT SERPL-MCNC: 6.5 G/DL — LOW (ref 6.6–8.7)
PROT UR-MCNC: NEGATIVE MG/DL — SIGNIFICANT CHANGE UP
RBC CASTS # UR COMP ASSIST: 0 /HPF — SIGNIFICANT CHANGE UP (ref 0–4)
SODIUM SERPL-SCNC: 129 MMOL/L — LOW (ref 135–145)
SP GR SPEC: >1.03 — HIGH (ref 1–1.03)
UROBILINOGEN FLD QL: 0.2 MG/DL — SIGNIFICANT CHANGE UP (ref 0.2–1)
WBC UR QL: 0 /HPF — SIGNIFICANT CHANGE UP (ref 0–5)

## 2025-02-05 PROCEDURE — 99285 EMERGENCY DEPT VISIT HI MDM: CPT

## 2025-02-05 PROCEDURE — 83735 ASSAY OF MAGNESIUM: CPT

## 2025-02-05 PROCEDURE — 71045 X-RAY EXAM CHEST 1 VIEW: CPT

## 2025-02-05 PROCEDURE — 74176 CT ABD & PELVIS W/O CONTRAST: CPT | Mod: 26

## 2025-02-05 PROCEDURE — 74176 CT ABD & PELVIS W/O CONTRAST: CPT | Mod: MC

## 2025-02-05 PROCEDURE — 84206 ASSAY OF PROINSULIN: CPT

## 2025-02-05 PROCEDURE — 84681 ASSAY OF C-PEPTIDE: CPT

## 2025-02-05 PROCEDURE — 99255 IP/OBS CONSLTJ NEW/EST HI 80: CPT

## 2025-02-05 PROCEDURE — 99223 1ST HOSP IP/OBS HIGH 75: CPT

## 2025-02-05 PROCEDURE — 85025 COMPLETE CBC W/AUTO DIFF WBC: CPT

## 2025-02-05 PROCEDURE — 0225U NFCT DS DNA&RNA 21 SARSCOV2: CPT

## 2025-02-05 PROCEDURE — 96374 THER/PROPH/DIAG INJ IV PUSH: CPT

## 2025-02-05 PROCEDURE — 81001 URINALYSIS AUTO W/SCOPE: CPT

## 2025-02-05 PROCEDURE — 80053 COMPREHEN METABOLIC PANEL: CPT

## 2025-02-05 PROCEDURE — 96375 TX/PRO/DX INJ NEW DRUG ADDON: CPT

## 2025-02-05 PROCEDURE — 36415 COLL VENOUS BLD VENIPUNCTURE: CPT

## 2025-02-05 PROCEDURE — 87086 URINE CULTURE/COLONY COUNT: CPT

## 2025-02-05 PROCEDURE — 84702 CHORIONIC GONADOTROPIN TEST: CPT

## 2025-02-05 PROCEDURE — 82962 GLUCOSE BLOOD TEST: CPT

## 2025-02-05 PROCEDURE — 84100 ASSAY OF PHOSPHORUS: CPT

## 2025-02-05 RX ORDER — INSULIN LISPRO 100/ML
VIAL (ML) SUBCUTANEOUS
Refills: 0 | Status: DISCONTINUED | OUTPATIENT
Start: 2025-02-05 | End: 2025-02-05

## 2025-02-05 RX ORDER — INSULIN GLARGINE-YFGN 100 [IU]/ML
20 INJECTION, SOLUTION SUBCUTANEOUS ONCE
Refills: 0 | Status: COMPLETED | OUTPATIENT
Start: 2025-02-05 | End: 2025-02-05

## 2025-02-05 RX ORDER — INSULIN LISPRO 100/ML
2 VIAL (ML) SUBCUTANEOUS
Refills: 0 | Status: DISCONTINUED | OUTPATIENT
Start: 2025-02-05 | End: 2025-02-05

## 2025-02-05 RX ORDER — ACETAMINOPHEN, DIPHENHYDRAMINE HCL, PHENYLEPHRINE HCL 325; 25; 5 MG/1; MG/1; MG/1
3 TABLET ORAL AT BEDTIME
Refills: 0 | Status: DISCONTINUED | OUTPATIENT
Start: 2025-02-05 | End: 2025-02-05

## 2025-02-05 RX ORDER — INSULIN LISPRO 100/ML
VIAL (ML) SUBCUTANEOUS AT BEDTIME
Refills: 0 | Status: DISCONTINUED | OUTPATIENT
Start: 2025-02-05 | End: 2025-02-05

## 2025-02-05 RX ORDER — DM/PSEUDOEPHED/ACETAMINOPHEN 10-30-250
12.5 CAPSULE ORAL ONCE
Refills: 0 | Status: COMPLETED | OUTPATIENT
Start: 2025-02-05 | End: 2025-02-05

## 2025-02-05 RX ORDER — BACTERIOSTATIC SODIUM CHLORIDE 0.9 %
1000 VIAL (ML) INJECTION
Refills: 0 | Status: DISCONTINUED | OUTPATIENT
Start: 2025-02-05 | End: 2025-02-05

## 2025-02-05 RX ORDER — DM/PSEUDOEPHED/ACETAMINOPHEN 10-30-250
25 CAPSULE ORAL ONCE
Refills: 0 | Status: DISCONTINUED | OUTPATIENT
Start: 2025-02-05 | End: 2025-02-05

## 2025-02-05 RX ORDER — SODIUM CHLORIDE 9 G/ML
1000 INJECTION, SOLUTION INTRAVENOUS
Refills: 0 | Status: DISCONTINUED | OUTPATIENT
Start: 2025-02-05 | End: 2025-02-05

## 2025-02-05 RX ORDER — MAGNESIUM SULFATE 0.8 MEQ/ML
1 AMPUL (ML) INJECTION ONCE
Refills: 0 | Status: COMPLETED | OUTPATIENT
Start: 2025-02-05 | End: 2025-02-05

## 2025-02-05 RX ORDER — MAGNESIUM, ALUMINUM HYDROXIDE 200-225/5
30 SUSPENSION, ORAL (FINAL DOSE FORM) ORAL EVERY 4 HOURS
Refills: 0 | Status: DISCONTINUED | OUTPATIENT
Start: 2025-02-05 | End: 2025-02-05

## 2025-02-05 RX ORDER — GLUCAGON 3 MG/1
1 POWDER NASAL ONCE
Refills: 0 | Status: DISCONTINUED | OUTPATIENT
Start: 2025-02-05 | End: 2025-02-05

## 2025-02-05 RX ORDER — DM/PSEUDOEPHED/ACETAMINOPHEN 10-30-250
12.5 CAPSULE ORAL ONCE
Refills: 0 | Status: DISCONTINUED | OUTPATIENT
Start: 2025-02-05 | End: 2025-02-05

## 2025-02-05 RX ORDER — HYDROMORPHONE HYDROCHLORIDE 4 MG/ML
0.5 INJECTION, SOLUTION INTRAMUSCULAR; INTRAVENOUS; SUBCUTANEOUS ONCE
Refills: 0 | Status: DISCONTINUED | OUTPATIENT
Start: 2025-02-05 | End: 2025-02-05

## 2025-02-05 RX ORDER — INSULIN LISPRO 100/ML
5 VIAL (ML) SUBCUTANEOUS ONCE
Refills: 0 | Status: COMPLETED | OUTPATIENT
Start: 2025-02-05 | End: 2025-02-05

## 2025-02-05 RX ORDER — ONDANSETRON 4 MG/1
4 TABLET, ORALLY DISINTEGRATING ORAL ONCE
Refills: 0 | Status: COMPLETED | OUTPATIENT
Start: 2025-02-05 | End: 2025-02-05

## 2025-02-05 RX ORDER — LIPASE/PROTEASE/AMYLASE 4K-25K-20K
8 CAPSULE,DELAYED RELEASE (ENTERIC COATED) ORAL
Refills: 0 | Status: DISCONTINUED | OUTPATIENT
Start: 2025-02-05 | End: 2025-02-05

## 2025-02-05 RX ORDER — DEXTROAMPHETAMINE SACCHARATE, AMPHETAMINE ASPARTATE, DEXTROAMPHETAMINE SULFATE AND AMPHETAMINE SULFATE 1.875; 1.875; 1.875; 1.875 MG/1; MG/1; MG/1; MG/1
1 TABLET ORAL
Refills: 0 | DISCHARGE

## 2025-02-05 RX ORDER — DM/PSEUDOEPHED/ACETAMINOPHEN 10-30-250
15 CAPSULE ORAL ONCE
Refills: 0 | Status: DISCONTINUED | OUTPATIENT
Start: 2025-02-05 | End: 2025-02-05

## 2025-02-05 RX ORDER — INSULIN GLARGINE-YFGN 100 [IU]/ML
20 INJECTION, SOLUTION SUBCUTANEOUS EVERY MORNING
Refills: 0 | Status: DISCONTINUED | OUTPATIENT
Start: 2025-02-06 | End: 2025-02-05

## 2025-02-05 RX ORDER — HYDROMORPHONE HYDROCHLORIDE 4 MG/ML
0.5 INJECTION, SOLUTION INTRAMUSCULAR; INTRAVENOUS; SUBCUTANEOUS DAILY
Refills: 0 | Status: DISCONTINUED | OUTPATIENT
Start: 2025-02-05 | End: 2025-02-05

## 2025-02-05 RX ORDER — ONDANSETRON 4 MG/1
4 TABLET, ORALLY DISINTEGRATING ORAL EVERY 8 HOURS
Refills: 0 | Status: DISCONTINUED | OUTPATIENT
Start: 2025-02-05 | End: 2025-02-05

## 2025-02-05 RX ORDER — LIPASE/PROTEASE/AMYLASE 4K-25K-20K
1 CAPSULE,DELAYED RELEASE (ENTERIC COATED) ORAL
Refills: 0 | Status: DISCONTINUED | OUTPATIENT
Start: 2025-02-05 | End: 2025-02-05

## 2025-02-05 RX ORDER — DEXTROAMPHETAMINE SACCHARATE, AMPHETAMINE ASPARTATE, DEXTROAMPHETAMINE SULFATE AND AMPHETAMINE SULFATE 1.875; 1.875; 1.875; 1.875 MG/1; MG/1; MG/1; MG/1
20 TABLET ORAL EVERY 12 HOURS
Refills: 0 | Status: DISCONTINUED | OUTPATIENT
Start: 2025-02-05 | End: 2025-02-05

## 2025-02-05 RX ORDER — ACETAMINOPHEN 160 MG/5ML
650 SUSPENSION ORAL EVERY 6 HOURS
Refills: 0 | Status: DISCONTINUED | OUTPATIENT
Start: 2025-02-05 | End: 2025-02-05

## 2025-02-05 RX ORDER — FAMOTIDINE 10 MG/ML
20 INJECTION INTRAVENOUS ONCE
Refills: 0 | Status: COMPLETED | OUTPATIENT
Start: 2025-02-05 | End: 2025-02-05

## 2025-02-05 RX ADMIN — Medication 100 GRAM(S): at 17:46

## 2025-02-05 RX ADMIN — ACETAMINOPHEN 650 MILLIGRAM(S): 160 SUSPENSION ORAL at 06:42

## 2025-02-05 RX ADMIN — HYDROMORPHONE HYDROCHLORIDE 0.5 MILLIGRAM(S): 4 INJECTION, SOLUTION INTRAMUSCULAR; INTRAVENOUS; SUBCUTANEOUS at 16:20

## 2025-02-05 RX ADMIN — SODIUM CHLORIDE 60 MILLILITER(S): 9 INJECTION, SOLUTION INTRAVENOUS at 12:09

## 2025-02-05 RX ADMIN — Medication 12.5 GRAM(S): at 17:23

## 2025-02-05 RX ADMIN — HYDROMORPHONE HYDROCHLORIDE 0.5 MILLIGRAM(S): 4 INJECTION, SOLUTION INTRAMUSCULAR; INTRAVENOUS; SUBCUTANEOUS at 15:25

## 2025-02-05 RX ADMIN — SODIUM CHLORIDE 120 MILLILITER(S): 9 INJECTION, SOLUTION INTRAVENOUS at 10:00

## 2025-02-05 RX ADMIN — ACETAMINOPHEN 650 MILLIGRAM(S): 160 SUSPENSION ORAL at 12:53

## 2025-02-05 RX ADMIN — HYDROMORPHONE HYDROCHLORIDE 0.5 MILLIGRAM(S): 4 INJECTION, SOLUTION INTRAMUSCULAR; INTRAVENOUS; SUBCUTANEOUS at 08:56

## 2025-02-05 RX ADMIN — Medication 5 UNIT(S): at 14:41

## 2025-02-05 RX ADMIN — Medication 2 UNIT(S): at 18:00

## 2025-02-05 RX ADMIN — Medication 75 MILLILITER(S): at 17:49

## 2025-02-05 RX ADMIN — HYDROMORPHONE HYDROCHLORIDE 0.5 MILLIGRAM(S): 4 INJECTION, SOLUTION INTRAMUSCULAR; INTRAVENOUS; SUBCUTANEOUS at 09:30

## 2025-02-05 RX ADMIN — FAMOTIDINE 20 MILLIGRAM(S): 10 INJECTION INTRAVENOUS at 01:40

## 2025-02-05 RX ADMIN — ONDANSETRON 4 MILLIGRAM(S): 4 TABLET, ORALLY DISINTEGRATING ORAL at 01:40

## 2025-02-05 RX ADMIN — Medication 8 CAPSULE(S): at 12:48

## 2025-02-05 RX ADMIN — INSULIN GLARGINE-YFGN 20 UNIT(S): 100 INJECTION, SOLUTION SUBCUTANEOUS at 14:57

## 2025-02-05 RX ADMIN — ACETAMINOPHEN 650 MILLIGRAM(S): 160 SUSPENSION ORAL at 13:50

## 2025-02-05 RX ADMIN — Medication 1 CAPSULE(S): at 08:57

## 2025-02-05 RX ADMIN — Medication 8 CAPSULE(S): at 17:50

## 2025-02-05 RX ADMIN — Medication 5: at 13:24

## 2025-02-05 NOTE — H&P ADULT - HISTORY OF PRESENT ILLNESS
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 Patient said her monitor woke her up around 3AM yesterday because her sugar was low, she took juice and candy, deactivated her Omnipod and went back to sleep. 2 hours later her  found her drenched in sweat, confused, checked her sugar and it was in the 50s, glucagon given with improvement in mental status. Her sugar continued to drop so she came to the ED. She reported abdominal pain located in the epigastric, sharp, non-radiating associated with nausea. She has no fever, chills, vomiting, sick contact, recent travel, chest pain, change in bowel/urinary habit.

## 2025-02-05 NOTE — DISCHARGE NOTE PROVIDER - HOSPITAL COURSE
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 Patient said her monitor woke her up around 3AM yesterday because her sugar was low, she took juice and candy, deactivated her Omnipod and went back to sleep. 2 hours later her  found her drenched in sweat, confused, checked her sugar and it was in the 50s, glucagon given with improvement in mental status  IN ED FS had been declning - and now stable and improving - she is able to tolerate diet now and has a CGM

## 2025-02-05 NOTE — DISCHARGE NOTE PROVIDER - NSDCMRMEDTOKEN_GEN_ALL_CORE_FT
Collagen Supplement: 1 tablet orally once a day  dextroamphetamine-amphetamine 20 mg oral tablet: 1 tab(s) orally once a day  glucagon 3 mg nasal powder: 3 milligram(s) intranasally once a day as needed for severe hypoglycemia  insulin aspart 100 units/mL injectable solution: Use in omnipod as directed 3 times a day. Max 75 units per day  melatonin 3 mg oral tablet: 1 tab(s) orally once a day (at bedtime) as needed for  insomnia  Multiple Vitamins oral tablet: 1 tab(s) orally once a day  pancrelipase 36,000 units-114,000 units-180,000 units oral delayed release capsule: 8 cap(s) orally 3 times a day

## 2025-02-05 NOTE — PROVIDER CONTACT NOTE (OTHER) - ACTION/TREATMENT ORDERED:
MD made aware, D5 with LR started at 120ml/hr. Will continue to monitor blood sugars q 2hr or more frequently as needed.

## 2025-02-05 NOTE — PROVIDER CONTACT NOTE (HYPOGLYCEMIA EVENT) - NS PROVIDER CONTACT ASSESS-HYPO
Patient was lethargic, endorsed fatigue and headache. PA made aware IVP Dextrose given. Blood glucose now 142. Patient is much more awake, alert and wants to eat dinner.

## 2025-02-05 NOTE — PROVIDER CONTACT NOTE (OTHER) - BACKGROUND
admitted for hypoglycemia, hx of whipple procedure
pt admitted for hypoglycemia, blood glucose on arrival was 22. FS q 2hr monitored.
admitted for hypoglycemia
patient admitted for hypoglycemia. Hx of whipple procedure

## 2025-02-05 NOTE — PROVIDER CONTACT NOTE (OTHER) - REASON
blood glucose variations
blood glucose is 287
blood sugar is 331
patient endorsing extreme thirst, blood sugar 333

## 2025-02-05 NOTE — PROVIDER CONTACT NOTE (OTHER) - ASSESSMENT
Patient endorses fatigue. Patient endorses fatigue and has blood glucose monitor on (from home) and it says her sugar is low.

## 2025-02-05 NOTE — PROVIDER CONTACT NOTE (HYPOGLYCEMIA EVENT) - NS PROVIDER CONTACT BACKGROUND-HYPO
Age: 24y    Gender: Female    POCT Blood Glucose:  142 mg/dL (02-05-25 @ 17:55)  54 mg/dL (02-05-25 @ 17:23)  61 mg/dL (02-05-25 @ 17:11)  103 mg/dL (02-05-25 @ 16:10)  424 mg/dL (02-05-25 @ 14:00)  358 mg/dL (02-05-25 @ 13:23)  331 mg/dL (02-05-25 @ 12:44)  333 mg/dL (02-05-25 @ 12:11)      eMAR:  dextrose 50% Injectable   50 milliLiter(s) IV Push (02-04-25 @ 20:33)    dextrose 50% Injectable   12.5 Gram(s) IV Push (02-05-25 @ 17:23)    insulin glargine Injectable (LANTUS)   20 Unit(s) SubCutaneous (02-05-25 @ 14:57)    insulin lispro (ADMELOG) corrective regimen sliding scale   5 Unit(s) SubCutaneous (02-05-25 @ 13:24)    insulin lispro Injectable (ADMELOG)   2 Unit(s) SubCutaneous (02-05-25 @ 18:00)    insulin lispro Injectable (ADMELOG).   5 Unit(s) SubCutaneous (02-05-25 @ 14:41)

## 2025-02-05 NOTE — PROVIDER CONTACT NOTE (OTHER) - ACTION/TREATMENT ORDERED:
MD made aware and does not want to give patient insulin at this time. Will continue to monitor. MD made aware and does not want to give patient insulin at this time. Will continue to monitor. IVF are still held.

## 2025-02-05 NOTE — H&P ADULT - NSHPPHYSICALEXAM_GEN_ALL_CORE
Vital Signs Last 24 Hrs  T(C): 36.7 (04 Feb 2025 20:10), Max: 36.7 (04 Feb 2025 20:10)  T(F): 98 (04 Feb 2025 20:10), Max: 98 (04 Feb 2025 20:10)  HR: 87 (04 Feb 2025 20:10) (87 - 87)  BP: 132/82 (04 Feb 2025 20:10) (132/82 - 132/82)  BP(mean): --  RR: 18 (04 Feb 2025 20:10) (18 - 18)  SpO2: 99% (04 Feb 2025 20:10) (99% - 99%)    Parameters below as of 04 Feb 2025 20:10  Patient On (Oxygen Delivery Method): room air

## 2025-02-05 NOTE — H&P ADULT - ASSESSMENT
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       Hypoglycemia with underlying DM-T3c   Admit to step down   FS: 43 on arrival   FS q2h   Will check C-peptide, proinsulin with AM lab   Endocrinology consulted   Insulin sliding scale with hypoglycemia protocol   Patient follows with endocrinology (Dr. Foote) at [place]     Hx of insulinoma   S/p Whipple procedure   Creon with meals     Hx of ADHD   Adderall 20mg bid     Supportive   DVT prophylaxis: CSD/ambulate as tolerated   Diet: CHO     Plan of care discussed with patient and ER nurse     Dispo: when stable, home.

## 2025-02-05 NOTE — PROVIDER CONTACT NOTE (OTHER) - SITUATION
patients repeat blood sugar is 287 while on D5 0.9% at 120ml/hr.
patient endorsing extreme thirst, blood sugar 333.
patients blood glucose was rechecked prior to patient eating lunch, glucose is 331.
Start of shift blood glucose 263 (insulin held as per MD and PA) and to recheck within the hour. at 0816 glucose was 163, at 0945 glucose dropped to 74.

## 2025-02-05 NOTE — CONSULT NOTE ADULT - SUBJECTIVE AND OBJECTIVE BOX
HPI:  25 y/o female with PMH of 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 Patient said her monitor woke her up around 3AM yesterday because her sugar was low, she took juice and candy, deactivated her Omnipod and went back to sleep. 2 hours later her  found her drenched in sweat, confused, checked her sugar and it was in the 50s, glucagon given with improvement in mental status. Her sugar continued to drop so she came to the ED. She reported abdominal pain located in the epigastric, sharp, non-radiating associated with nausea. She has no fever, chills, vomiting, sick contact, recent travel, chest pain, change in bowel/urinary habit. (05 Feb 2025 03:53)    Endocrine consulted for hypoglycemia in setting of Type 3c Diabetes s/p Whipple procedure for insulinoma and nesidioblastosis. Has been on Omnipod pump/Dexcom CGM for past 2 years. Briefly on insulin injections post op.  [facility] ER visit 2 weeks ago for hypoglycemia. Per pt Tuesday 3 am, pump ran out of insulin?/stopped working and she removed it then 2 hours later developed severe hypoglycemia.  It is unclear if she keeps herself in automode or swicthes to manual mode.  She does not recall giving herself any unusual extra doses of insulin that day.  In ER, started on D5 containing fluids and hypoglycemia resolved, now hyperglycemic.    Omnipod settings  Basal rates: 12am-1am 0.7 units/hour, 1am-4 am 0.7 units/hour, 4am-6pm 3 units/hour, 6pm-12 am 0.4 units/hour  Total daily dose basal insulin 47 unit/day  ICR 1:12, FS 1:60 w target 130    REVIEW OF SYSTEMS:  CONSTITUTIONAL:  (+) weakness, No fevers or chills  HEAD: (+) headache  EYES:  No visual changes;  No blurry vision  NECK:  No anterior neck pain, dysphagia or voice changes  RESPIRATORY:  No cough. No wheezing. No shortness of breath  CARDIOVASCULAR:  No chest pain. No palpitations  GASTROINTESTINAL:  (+)  epigastric pain. (+)  nausea. No vomiting; No diarrhea or constipation.   GENITOURINARY:  No dysuria, frequency or hematuria  NEUROLOGICAL:  No numbness in extremities  ENDO: No excessive thirst. No nocturia.   SKIN:  No itching, rashes    PAST MEDICAL & SURGICAL HISTORY:  H/O type 1 diabetes mellitus  History of PCOS  Anxiety  Acute osteomyelitis  History of Whipple procedure  History of tonsillectomy  H/O dilation and curettage    FAMILY HISTORY:  FH: HTN (hypertension) (Mother)    SOCIAL HISTORY: denies tobacco, illicit drugs or EtOh use    MEDICATIONS  (STANDING):  amphetamine/dextroamphetamine 20 milliGRAM(s) Oral every 12 hours  dextrose 5% + lactated ringers. 1000 milliLiter(s) (60 mL/Hr) IV Continuous <Continuous>  dextrose 5%. 1000 milliLiter(s) (100 mL/Hr) IV Continuous <Continuous>  dextrose 5%. 1000 milliLiter(s) (50 mL/Hr) IV Continuous <Continuous>  dextrose 50% Injectable 25 Gram(s) IV Push once  dextrose 50% Injectable 12.5 Gram(s) IV Push once  dextrose 50% Injectable 25 Gram(s) IV Push once  glucagon  Injectable 1 milliGRAM(s) IntraMuscular once  insulin lispro (ADMELOG) corrective regimen sliding scale   SubCutaneous three times a day before meals  insulin lispro (ADMELOG) corrective regimen sliding scale   SubCutaneous at bedtime  insulin lispro Injectable (ADMELOG). 5 Unit(s) SubCutaneous once  pancrelipase  (CREON 36,000 Lipase Units) 8 Capsule(s) Oral three times a day with meals    MEDICATIONS  (PRN):  acetaminophen     Tablet .. 650 milliGRAM(s) Oral every 6 hours PRN Temp greater or equal to 38C (100.4F), Mild Pain (1 - 3)  aluminum hydroxide/magnesium hydroxide/simethicone Suspension 30 milliLiter(s) Oral every 4 hours PRN Dyspepsia  dextrose Oral Gel 15 Gram(s) Oral once PRN Blood Glucose LESS THAN 70 milliGRAM(s)/deciliter  HYDROmorphone  Injectable 0.5 milliGRAM(s) IV Push daily PRN Severe Pain (7 - 10)  melatonin 3 milliGRAM(s) Oral at bedtime PRN Insomnia  ondansetron Injectable 4 milliGRAM(s) IV Push every 8 hours PRN Nausea and/or Vomiting    ALLERGIES: Augmentin (Anaphylaxis)  penicillin (Hives)    Vital Signs Last 24 Hrs  T(C): 36.9 (05 Feb 2025 12:00), Max: 37.1 (05 Feb 2025 11:40)  T(F): 98.4 (05 Feb 2025 12:00), Max: 98.7 (05 Feb 2025 11:40)  HR: 78 (05 Feb 2025 12:00) (71 - 88)  BP: 105/62 (05 Feb 2025 12:00) (97/61 - 132/82)  BP(mean): 73 (05 Feb 2025 11:40) (73 - 81)  RR: 18 (05 Feb 2025 12:00) (18 - 18)  SpO2: 99% (05 Feb 2025 12:00) (96% - 99%)    Physical Exam:  General appearance: Well developed, NAD  Eyes: EOMI  Neck: No thyroid enlargement. No palpable thyroid nodules  Lungs: Normal respiratory excursion. Lungs clear no w/r/r  CV: Normal S1S2, regular. No m/r/g.  Pedal pulses intact.  Abdomen: Soft, nontender, nondistended, (+) BS  Musculoskeletal: No cyanosis, clubbing, or edema.  Skin: Warm and moist.   Neuro: Cranial nerves intact.   Psych: Normal affect, good judgement/insight      LABS:                        12.1   7.41  )-----------( 311      ( 04 Feb 2025 20:22 )             36.2     02-04    137  |  98  |  18.1  ----------------------------<  37[LL]  3.8   |  26.0  |  0.56    Ca    8.9      04 Feb 2025 20:22    TPro  7.0  /  Alb  3.9  /  TBili  0.4  /  DBili  x   /  AST  22  /  ALT  11  /  AlkPhos  79  02-04    LIVER FUNCTIONS - ( 04 Feb 2025 20:22 )  Alb: 3.9 g/dL / Pro: 7.0 g/dL / ALK PHOS: 79 U/L / ALT: 11 U/L / AST: 22 U/L / GGT: x           A1C with Estimated Average Glucose Result: 7.6 % (01-24-25 @ 06:20)  A1C with Estimated Average Glucose Result: 7.5 % (01-03-25 @ 16:20)    CAPILLARY BLOOD GLUCOSE  POCT Blood Glucose.: 424 mg/dL (05 Feb 2025 14:00)  POCT Blood Glucose.: 358 mg/dL (05 Feb 2025 13:23)  POCT Blood Glucose.: 331 mg/dL (05 Feb 2025 12:44)  POCT Blood Glucose.: 333 mg/dL (05 Feb 2025 12:11)  POCT Blood Glucose.: 287 mg/dL (05 Feb 2025 11:02)  POCT Blood Glucose.: 74 mg/dL (05 Feb 2025 09:45)  POCT Blood Glucose.: 163 mg/dL (05 Feb 2025 08:16)  POCT Blood Glucose.: 263 mg/dL (05 Feb 2025 07:29)  POCT Blood Glucose.: 152 mg/dL (05 Feb 2025 05:08)  POCT Blood Glucose.: 94 mg/dL (05 Feb 2025 01:59)  POCT Blood Glucose.: 73 mg/dL (05 Feb 2025 01:35)  POCT Blood Glucose.: 76 mg/dL (05 Feb 2025 00:48)  POCT Blood Glucose.: 187 mg/dL (04 Feb 2025 23:51)  POCT Blood Glucose.: 193 mg/dL (04 Feb 2025 22:51)  POCT Blood Glucose.: 160 mg/dL (04 Feb 2025 21:45)  POCT Blood Glucose.: 151 mg/dL (04 Feb 2025 20:42)  POCT Blood Glucose.: 43 mg/dL (04 Feb 2025 20:06)

## 2025-02-05 NOTE — PATIENT PROFILE ADULT - HISTORY OF COVID-19 VACCINATION
2020     Ashley Omalley MD  [address] [facility] [address]    Patient: Ritu Ramirez  YOB: 1956  Date of Visit: 2020      Dear Dr. Sirisha Omalley:    Thank you for referring Ritu Ramirez to me for evaluation. Below are my notes for this consultation.    If you have questions, please do not hesitate to call me. I look forward to following your patient along with you.         Sincerely,        Bright Selby MD        CC: No Recipients  Bright Selby MD  2020  5:32 PM  Signed     Cardiology Note       Reason for visit:   Chief Complaint   Patient presents with   • Hypertension      HPI   Ritu Ramirez is a 63 y.o. female who presents for scheduled cardiovascular follow-up in the office.    I saw her in December.  She had a stress test in February that was negative.  She had issues with sciatica but this improved with an injection.  She does do some walking.  She eats a healthy diet and her weight is stable.  At this time she denies chest pain, shortness breath, palpitations, lightheadedness or syncope.  She tolerates rosuvastatin without apparent side effects.      Past Medical History:   Diagnosis Date   • Hypertension    • Systemic mastocytosis      Past Surgical History:   Procedure Laterality Date   • COLONOSCOPY     • REDUCTION MAMMAPLASTY       Social History     Socioeconomic History   • Marital status:      Spouse name: None   • Number of children: None   • Years of education: None   • Highest education level: None   Occupational History   • None   Social Needs   • Financial resource strain: None   • Food insecurity:     Worry: None     Inability: None   • Transportation needs:     Medical: None     Non-medical: None   Tobacco Use   • Smoking status: Former Smoker     Packs/day: 0.25     Last attempt to quit:      Years since quittin.5   • Smokeless tobacco: Never Used   Substance and Sexual Activity   • Alcohol use: Yes      Comment: Social   • Drug use: No   • Sexual activity: Defer   Lifestyle   • Physical activity:     Days per week: None     Minutes per session: None   • Stress: None   Relationships   • Social connections:     Talks on phone: None     Gets together: None     Attends Yazidism service: None     Active member of club or organization: None     Attends meetings of clubs or organizations: None     Relationship status: None   • Intimate partner violence:     Fear of current or ex partner: None     Emotionally abused: None     Physically abused: None     Forced sexual activity: None   Other Topics Concern   • None   Social History Narrative   • None     No family history on file.  Anesthetics - amide type; Clarithromycin; Erythromycin base; Iodinated contrast media; Nsaids (non-steroidal anti-inflammatory drug); and Penicillins  Current Outpatient Medications   Medication Sig Dispense Refill   • ALPRAZolam (XANAX) 0.25 mg tablet TAKE 1 TABLET BY MOUTH DAILY AS NEEDED FOR ANXIETY 30 tablet 1   • amLODIPine (NORVASC) 5 mg tablet Take 5 mg by mouth daily.       • cholecalciferol, vitamin D3, 1,000 unit capsule take 1 capsule by oral route  every day     • escitalopram (LEXAPRO) 10 mg tablet Take 1 tablet (10 mg total) by mouth daily. 90 tablet 3   • hydrochlorothiazide (HYDRODIURIL) 25 mg tablet Take 25 mg by mouth once daily.  3   • potassium chloride (KLOR-CON) 20 mEq CR tablet Take 20 mEq by mouth daily.     • rosuvastatin (CRESTOR) 10 mg tablet Take 10 mg by mouth daily.       No current facility-administered medications for this visit.        Review of Systems   Constitution: Negative for diaphoresis, malaise/fatigue, weight gain and weight loss.   HENT: Negative for nosebleeds.    Eyes: Negative for blurred vision.   Cardiovascular: Positive for dyspnea on exertion. Negative for chest pain, claudication, leg swelling, orthopnea, palpitations and syncope.   Respiratory: Negative for cough and shortness of breath.     Hematologic/Lymphatic: Negative for adenopathy.   Skin: Negative for color change.   Musculoskeletal: Negative for muscle cramps.   Gastrointestinal: Negative for heartburn.   Genitourinary: Negative for nocturia.   Neurological: Negative for light-headedness.   Psychiatric/Behavioral: Negative for altered mental status.     Objective   Vitals:    06/24/20 1337   BP: 122/78   BP Location: Left upper arm   Patient Position: Sitting   Pulse: 68   Weight: 54 kg (119 lb)   Height: 1.524 m (5')     Weight: 54 kg (119 lb)     Physical Exam:    General Appearance:  Alert, no distress   Head:  Normocephalic, without obvious abnormality, atraumatic   Eyes:  Conjunctiva/corneas clear, EOM's intact   Neck: No thyroid enlargement. No JVD. No bruits    Lungs:   Clear to auscultation bilaterally, respirations unlabored, no rales, no wheezing   Heart:  Regular rhythm, S1 and S2 normal, no murmur, rub or gallop   Abdomen:   Soft, non-tender, no masses, no organomegaly   Vascular: Pulses 2+ and symmetric all extremities, no carotid bruit or jugular vein distention   Musculoskeletal:  Skin: No injury or deformity  Skin color, texture, turgor normal, no rashes or lesions, no cyanosis or edema   Extremities: Extremities normal, atraumatic, pulses normal   Behavior/Emotional: Appropriate, cooperative       Lab Results   Component Value Date    WBC 10.01 06/12/2020    HGB 9.8 (L) 06/12/2020     (H) 06/12/2020    CHOL 223 (H) 12/07/2019    TRIG 95 12/07/2019    HDL 88 12/07/2019    LDLCALC 116 (H) 12/07/2019    ALT 21 05/21/2020    AST 21 05/21/2020     05/21/2020    K 3.8 05/21/2020    CREATININE 0.7 05/21/2020    TSH 3.320 07/10/2019    INR 0.9 11/02/2017    HGBA1C 5.0 06/05/2015          ECG   sinus rhythm with sinus arrhythmia  PCWP LAD   ASSESSMENT/PLAN    Problem List Items Addressed This Visit        High    Essential hypertension    Overview     2017 echocardiogram: Normal structural heart         Current Assessment  & Plan     Her blood pressure is excellent today on a combination of hydrochlorothiazide and amlodipine.         Relevant Orders    ECG 12 LEAD-OFFICE PERFORMED (Completed)    Elevated coronary artery calcium score - Primary    Overview     6/2019 score: 128 (90th%)  2/12/2020 SE--11:16, 92%mPHR, negative         Current Assessment & Plan     Her stress test was low risk.  We will continue to treat her with statin therapy.  She will continue to try to walk if possible.  Hopefully her back will allow this.  She knows to contact me with any symptoms of concern.         Relevant Medications    rosuvastatin (CRESTOR) 10 mg tablet    Other Relevant Orders    ECG 12 LEAD-OFFICE PERFORMED (Completed)       Medium    Systemic mastocytosis    Current Assessment & Plan     This is under treatment.         Relevant Orders    ECG 12 LEAD-OFFICE PERFORMED (Completed)    Hypokalemia    Current Assessment & Plan     We will monitor her potassium.  At present she does take a supplement.         Relevant Orders    ECG 12 LEAD-OFFICE PERFORMED (Completed)    Dyslipidemia    Current Assessment & Plan     She will need a lipid profile on rosuvastatin.  I gave her a lab slip.  I will contact her with the results.         Relevant Orders    Lipid panel       Low    Sinus bradycardia    Current Assessment & Plan     Her heart rate is acceptable off beta-blocker therapy.         Relevant Medications    rosuvastatin (CRESTOR) 10 mg tablet    Other Relevant Orders    ECG 12 LEAD-OFFICE PERFORMED (Completed)           Return in about 7 months (around 1/25/2021).    Bright Selby MD  6/25/2020                               No

## 2025-02-05 NOTE — CONSULT NOTE ADULT - ASSESSMENT
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    1. Hypoglycemia resolved with D5 containing fluids, now hyperglycemic with symptoms of abd pains and nausea, has been off insulin pump >24 hours and no basal insulin  - stat BMP to rule out DKA, discussed with ER NP  - given Admelog 5 units stat x1 and she reported improving glucose on Dexcom CGM  - Start Lantus 20 units daily (1st dose now), discussed with ER NP  - unclear etiology of recurrent severe hypoglycemia with neuroglycopenic sx, omnipod with very high daytime basal rates (3 units/hour) vs omnipod malfunction while in automode giving too much insulin vs recurrence of nesidioblastosis (unlikely as A1c is 7.6)  - advised that she stop using insulin pump for now, reach out to Omnipod to trouble shoots any issues, and start basal/bolus insulin regimen  - in addition to Lantus, start Admelog 2 units premeals + Ademelog scale  - check Cpeptide level 23 y/o female with PMH of 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 Hypoglycemia resolved with D5 containing fluids, now hyperglycemic with symptoms of abd pains and nausea, has been off insulin pump >24 hours and no basal insulin  - stat BMP to rule out DKA, discussed with ER NP  - given Admelog 5 units stat x1 and she reported improving glucose on Dexcom CGM  - Start Lantus 20 units daily (1st dose now), discussed with ER NP  - unclear etiology of recurrent severe hypoglycemia with neuroglycopenic sx, omnipod with very high daytime basal rates (3 units/hour) vs omnipod malfunction while in automode giving too much insulin vs recurrence of nesidioblastosis (unlikely as A1c is 7.6)  - advised that she stop using insulin pump for now, reach out to Omnipod to trouble shoots any issues, and start basal/bolus insulin regimen  - in addition to Lantus, start Admelog 2 units premeals + Ademelog scale  - check Cpeptide level  - insulin teaching prior to discharge  - she follows with [facility] Dr. 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 Hypoglycemia resolved with D5 containing fluids, now hyperglycemic with symptoms of abd pains and nausea, has been off insulin pump >24 hours and no basal insulin  - stat BMP to rule out DKA, discussed with ER NP  - given Admelog 5 units stat x1 and she reported improving glucose on Dexcom CGM  - Start Lantus 20 units daily (1st dose now), discussed with ER NP  - unclear etiology of recurrent severe hypoglycemia with neuroglycopenic sx, omnipod with very high daytime basal rates (3 units/hour) vs omnipod malfunction while in automode giving too much insulin vs recurrence of nesidioblastosis (unlikely as A1c is 7.6)  - she does not know how to changes insulin pump settings and does not know why her daytime basal rates are so high, I advised that she stop using insulin pump for now, reach out to Omnipod to troubleshoot any issues, and start basal/bolus insulin regimen  - in addition to Lantus daily, start Admelog 2 units premeals + Ademelog scale  - check Cpeptide level  - insulin teaching prior to discharge  - she follows with [facility] Dr. Foote but has new appt scheduled 3/10/25 with [facility] endocrinology, Advised that she keep this new appointment and follow up with [facility] endocrinology until she sees new endocrinologist

## 2025-02-05 NOTE — CHART NOTE - NSCHARTNOTEFT_GEN_A_CORE
Pt requesting to leave AMA as does not have . Explained to pt the risks of leaving against medical advice. Many attempts have been made to reason with pt to remain in hospital, including working with  to arrange for , however pt refusing to cooperate and wants to leave AMA. Pt aware of consequences of leaving against medical advice including harm, injury or death. Pt fully understands, all question have been answered. Explained to pt the importance of Following up with endocrinologist. Pt agrees to comply, however left prior to signing AMA form. D/w Dr. Cho.
patient admitted earlier today- patient examined chart reviewed - called endocrine consult and DW NP- await recommendation- intially BS were dropping shrt half hour of D5LR given when not able to eat- not can eat - diet and FS with coverage resumed - close monitoring with RN at bedside multiple times

## 2025-02-05 NOTE — PATIENT PROFILE ADULT - FALL HARM RISK - FALL HARM RISK
[FreeTextEntry1] : Overweight/Fatigue/DM - Labs for Fatigue and Dysmetabolism. HLD - Lipid Panel.  PSA Screening/BPH w LUTS - PSA. COVID 19 Screening - + COVID 19 Abs.   F/U 2 wks to Review Test Results.   
[FreeTextEntry1] : Overweight/Fatigue/DM - Labs for Fatigue and Dysmetabolism. HLD - Lipid Panel.  PSA Screening/BPH w LUTS - PSA. COVID 19 Screening - + COVID 19 Abs.   F/U 2 wks to Review Test Results.   
Other

## 2025-02-06 ENCOUNTER — NON-APPOINTMENT (OUTPATIENT)
Age: 25
End: 2025-02-06

## 2025-02-06 LAB
CULTURE RESULTS: SIGNIFICANT CHANGE UP
SPECIMEN SOURCE: SIGNIFICANT CHANGE UP

## 2025-02-11 LAB — PROINSULIN SERPL-MCNC: 0.6 PMOL/L — SIGNIFICANT CHANGE UP (ref 0–10)

## 2025-02-21 ENCOUNTER — TRANSCRIPTION ENCOUNTER (OUTPATIENT)
Age: 25
End: 2025-02-21

## 2025-02-28 ENCOUNTER — EMERGENCY (EMERGENCY)
Facility: HOSPITAL | Age: 25
LOS: 1 days | Discharge: LEFT BEFORE DISCHARGE | End: 2025-02-28
Attending: EMERGENCY MEDICINE | Admitting: EMERGENCY MEDICINE
Payer: COMMERCIAL

## 2025-02-28 VITALS
WEIGHT: 178.57 LBS | DIASTOLIC BLOOD PRESSURE: 74 MMHG | SYSTOLIC BLOOD PRESSURE: 113 MMHG | TEMPERATURE: 98 F | OXYGEN SATURATION: 97 % | RESPIRATION RATE: 18 BRPM | HEIGHT: 66 IN | HEART RATE: 95 BPM

## 2025-02-28 DIAGNOSIS — Z90.410 ACQUIRED TOTAL ABSENCE OF PANCREAS: Chronic | ICD-10-CM

## 2025-02-28 DIAGNOSIS — Z98.890 OTHER SPECIFIED POSTPROCEDURAL STATES: Chronic | ICD-10-CM

## 2025-02-28 DIAGNOSIS — Z90.89 ACQUIRED ABSENCE OF OTHER ORGANS: Chronic | ICD-10-CM

## 2025-02-28 LAB
ACETONE SERPL-MCNC: ABNORMAL
ALBUMIN SERPL ELPH-MCNC: 4.3 G/DL — SIGNIFICANT CHANGE UP (ref 3.3–5)
ALP SERPL-CCNC: 129 U/L — HIGH (ref 30–120)
ALT FLD-CCNC: 24 U/L — SIGNIFICANT CHANGE UP (ref 10–60)
ANION GAP SERPL CALC-SCNC: 13 MMOL/L — SIGNIFICANT CHANGE UP (ref 5–17)
APPEARANCE UR: CLEAR — SIGNIFICANT CHANGE UP
AST SERPL-CCNC: 26 U/L — SIGNIFICANT CHANGE UP (ref 10–40)
BASE EXCESS BLDV CALC-SCNC: -0.4 MMOL/L — SIGNIFICANT CHANGE UP (ref -2–3)
BASOPHILS # BLD AUTO: 0.01 K/UL — SIGNIFICANT CHANGE UP (ref 0–0.2)
BASOPHILS NFR BLD AUTO: 0.1 % — SIGNIFICANT CHANGE UP (ref 0–2)
BILIRUB SERPL-MCNC: 1.5 MG/DL — HIGH (ref 0.2–1.2)
BILIRUB UR-MCNC: NEGATIVE — SIGNIFICANT CHANGE UP
BUN SERPL-MCNC: 18 MG/DL — SIGNIFICANT CHANGE UP (ref 7–23)
CALCIUM SERPL-MCNC: 9.3 MG/DL — SIGNIFICANT CHANGE UP (ref 8.4–10.5)
CHLORIDE SERPL-SCNC: 91 MMOL/L — LOW (ref 96–108)
CO2 SERPL-SCNC: 23 MMOL/L — SIGNIFICANT CHANGE UP (ref 22–31)
COLOR SPEC: YELLOW — SIGNIFICANT CHANGE UP
CREAT SERPL-MCNC: 0.94 MG/DL — SIGNIFICANT CHANGE UP (ref 0.5–1.3)
DIFF PNL FLD: NEGATIVE — SIGNIFICANT CHANGE UP
EGFR: 86 ML/MIN/1.73M2 — SIGNIFICANT CHANGE UP
EOSINOPHIL # BLD AUTO: 0.03 K/UL — SIGNIFICANT CHANGE UP (ref 0–0.5)
EOSINOPHIL NFR BLD AUTO: 0.3 % — SIGNIFICANT CHANGE UP (ref 0–6)
GLUCOSE SERPL-MCNC: 541 MG/DL — CRITICAL HIGH (ref 70–99)
GLUCOSE UR QL: >=1000 MG/DL
HCG UR QL: NEGATIVE — SIGNIFICANT CHANGE UP
HCO3 BLDV-SCNC: 25 MMOL/L — SIGNIFICANT CHANGE UP (ref 22–29)
HCT VFR BLD CALC: 36.9 % — SIGNIFICANT CHANGE UP (ref 34.5–45)
HGB BLD-MCNC: 12.1 G/DL — SIGNIFICANT CHANGE UP (ref 11.5–15.5)
IMM GRANULOCYTES NFR BLD AUTO: 0.1 % — SIGNIFICANT CHANGE UP (ref 0–0.9)
KETONES UR-MCNC: 15 MG/DL
LEUKOCYTE ESTERASE UR-ACNC: NEGATIVE — SIGNIFICANT CHANGE UP
LIDOCAIN IGE QN: 7 U/L — LOW (ref 16–77)
LYMPHOCYTES # BLD AUTO: 1.53 K/UL — SIGNIFICANT CHANGE UP (ref 1–3.3)
LYMPHOCYTES # BLD AUTO: 17.4 % — SIGNIFICANT CHANGE UP (ref 13–44)
MCHC RBC-ENTMCNC: 30.5 PG — SIGNIFICANT CHANGE UP (ref 27–34)
MCHC RBC-ENTMCNC: 32.8 G/DL — SIGNIFICANT CHANGE UP (ref 32–36)
MCV RBC AUTO: 92.9 FL — SIGNIFICANT CHANGE UP (ref 80–100)
MONOCYTES # BLD AUTO: 0.49 K/UL — SIGNIFICANT CHANGE UP (ref 0–0.9)
MONOCYTES NFR BLD AUTO: 5.6 % — SIGNIFICANT CHANGE UP (ref 2–14)
NEUTROPHILS # BLD AUTO: 6.7 K/UL — SIGNIFICANT CHANGE UP (ref 1.8–7.4)
NEUTROPHILS NFR BLD AUTO: 76.5 % — SIGNIFICANT CHANGE UP (ref 43–77)
NITRITE UR-MCNC: NEGATIVE — SIGNIFICANT CHANGE UP
NRBC BLD AUTO-RTO: 0 /100 WBCS — SIGNIFICANT CHANGE UP (ref 0–0)
PCO2 BLDV: 43 MMHG — HIGH (ref 39–42)
PH BLDV: 7.37 — SIGNIFICANT CHANGE UP (ref 7.32–7.43)
PH UR: 5 — SIGNIFICANT CHANGE UP (ref 5–8)
PLATELET # BLD AUTO: 302 K/UL — SIGNIFICANT CHANGE UP (ref 150–400)
PO2 BLDV: 42 MMHG — SIGNIFICANT CHANGE UP (ref 25–45)
POTASSIUM SERPL-MCNC: 4.4 MMOL/L — SIGNIFICANT CHANGE UP (ref 3.5–5.3)
POTASSIUM SERPL-SCNC: 4.4 MMOL/L — SIGNIFICANT CHANGE UP (ref 3.5–5.3)
PROT SERPL-MCNC: 8.4 G/DL — HIGH (ref 6–8.3)
PROT UR-MCNC: NEGATIVE MG/DL — SIGNIFICANT CHANGE UP
RBC # BLD: 3.97 M/UL — SIGNIFICANT CHANGE UP (ref 3.8–5.2)
RBC # FLD: 11 % — SIGNIFICANT CHANGE UP (ref 10.3–14.5)
SAO2 % BLDV: 70.6 % — SIGNIFICANT CHANGE UP (ref 67–88)
SODIUM SERPL-SCNC: 127 MMOL/L — LOW (ref 135–145)
SP GR SPEC: 1.02 — SIGNIFICANT CHANGE UP (ref 1–1.03)
UROBILINOGEN FLD QL: 0.2 MG/DL — SIGNIFICANT CHANGE UP (ref 0.2–1)
WBC # BLD: 8.77 K/UL — SIGNIFICANT CHANGE UP (ref 3.8–10.5)
WBC # FLD AUTO: 8.77 K/UL — SIGNIFICANT CHANGE UP (ref 3.8–10.5)

## 2025-02-28 PROCEDURE — 81003 URINALYSIS AUTO W/O SCOPE: CPT

## 2025-02-28 PROCEDURE — 82803 BLOOD GASES ANY COMBINATION: CPT

## 2025-02-28 PROCEDURE — 82962 GLUCOSE BLOOD TEST: CPT

## 2025-02-28 PROCEDURE — 96360 HYDRATION IV INFUSION INIT: CPT

## 2025-02-28 PROCEDURE — 99291 CRITICAL CARE FIRST HOUR: CPT

## 2025-02-28 PROCEDURE — 83690 ASSAY OF LIPASE: CPT

## 2025-02-28 PROCEDURE — 99283 EMERGENCY DEPT VISIT LOW MDM: CPT | Mod: 25

## 2025-02-28 PROCEDURE — 80053 COMPREHEN METABOLIC PANEL: CPT

## 2025-02-28 PROCEDURE — 82009 KETONE BODYS QUAL: CPT

## 2025-02-28 PROCEDURE — 81025 URINE PREGNANCY TEST: CPT

## 2025-02-28 PROCEDURE — 85025 COMPLETE CBC W/AUTO DIFF WBC: CPT

## 2025-02-28 RX ADMIN — Medication 1000 MILLILITER(S): at 17:32

## 2025-02-28 RX ADMIN — Medication 1000 MILLILITER(S): at 18:25

## 2025-02-28 NOTE — ED ADULT NURSE NOTE - CHIEF COMPLAINT QUOTE
" Im diabetic , Im on insulin pump - my glucose is high ( 570 )  I gave myself  regular insulin since this morning - total of 44 units " Pt was tested (+) for Inf A last week (+) Mild abdominal pain

## 2025-02-28 NOTE — ED PROVIDER NOTE - CARE PROVIDER_API CALL
Luz Maria Ellis  Endocrinology/Metab/Diabetes  [address] [place] [address] [address]  [place] [address]  Phone: [phone]  Fax: [phone]  Follow Up Time: 1-3 Days

## 2025-02-28 NOTE — ED PROVIDER NOTE - PROGRESS NOTE DETAILS
Patient eloped without notifying provider.  Patient was initially very reluctant to stay in emergency room to have any workup or treatment, states she did not have time and wanted to get back to work.  Concerns of the seriousness of her condition of elevated sugar discussed with patient and patient agreed to have blood work and IV fluids.  Blood work was obtained and given IV fluids.  No evidence of DKA on blood work.  Did have low sodium and elevated blood sugar.  Patient eloped before full liter of fluids infused, suspected received 750 mL of normal saline.  Was taking p.o. fluids.  Patient requested IV to be taken out by nurse.  States she needed to leave and cannot stay in emergency room any longer.  She told nurse had an 8-month-old baby and  needed to leave.  Had no family in area.  Eloped emergency room without notifying provider.

## 2025-02-28 NOTE — ED ADULT NURSE NOTE - OBJECTIVE STATEMENT
pt arrives to ED with reports that her BG has been reading high all day today, in the 500's despite giving self a total of 44units of insulin so far today. pt reports frequent urination and mild abd pain. pt endorses last week she had Flu A, symptoms improved. no fevers.

## 2025-02-28 NOTE — ED PROVIDER NOTE - CLINICAL SUMMARY MEDICAL DECISION MAKING FREE TEXT BOX
25-year-old female with a history of type 1 diabetes presents with has had some elevated blood sugars since this morning.  The patient states she was having normal blood sugars yesterday, but today was elevated.  The patient had a URI last week, but has been feeling much improved.  She had influenza A.  No acute chest pain.  No shortness of breath.  No vomiting or diarrhea.  No neck pain or stiffness.  No photophobia.  Patient is having some slight epigastric discomfort today.  No lower abdominal discomfort.  No dysuria/hematuria.  No aggravating or alleviating factors otherwise noted.  No other acute complaints.  Patient has been self bolusing with her pump, but is still having elevated sugars.  No aggravating or alleviating factors otherwise noted.  No other acute complaints.  Exam: Nontoxic, well-appearing.  Normal respiratory effort.  CTA BL, no W/R/R.  Normal cardiac exam.  Abdomen soft, question minimal epigastric tenderness.  No rebound or guarding.  No HSM.  No CVAT.  No C/C/E.  Nonfocal neurologic exam.  No other acute findings on exam.  Acute diabetic hyperglycemia, will rule out DKA.  Will check labs, blood sugar control, IV fluids, admission

## 2025-02-28 NOTE — ED PROVIDER NOTE - DIFFERENTIAL DIAGNOSIS
Differential Diagnosis Differentials include but not limited to hyperglycemia, electrolyte abnormality, DKA, infection, dehydration

## 2025-02-28 NOTE — ED ADULT TRIAGE NOTE - CHIEF COMPLAINT QUOTE
" Im diabetic , Im on insulin pump - my glucose is high ( 570 )  I gave myself  regular insulin since this morning - total of 44 units " Pt was tested (+) for Inf A last week (+) Mild abdominal pain impaired balance/impaired postural control/decreased strength

## 2025-02-28 NOTE — ED ADULT NURSE NOTE - NS TRANSFER PATIENT BELONGINGS
back pack, earrings, necklace/Wrist Watch/Cell Phone/PDA (specify)/Jewelry/Money (specify)/Other belongings/Clothing

## 2025-02-28 NOTE — ED PROVIDER NOTE - OBJECTIVE STATEMENT
25-year-old female with history of diabetes mellitus, PCOS, anxiety, history of osteomyelitis, and status post Whipple procedure (9/21, no spleen, gallbladder, pancreas, duodenum and missing part of lower intestine) presents with elevated blood sugar today.  States blood sugars were normal yesterday.  Had influenza last week but overall improved.  States glucose today was 570 and has been taking regular insulin.  Has taken total of 44 units today.  Has slight epigastric discomfort but denies any other pain or complaints.  No weakness or dizziness.  No chest pain or shortness of breath.  No urinary symptoms.  No nausea, vomiting, or diarrhea.  Patient was admitted overnight at [facility] a month ago for hypoglycemia  PCP Maribel Ellis (appt March 10th)

## 2025-02-28 NOTE — ED ADULT NURSE REASSESSMENT NOTE - REASSESS COMMUNICATION
IV removed upon patient request. states  needs to leave, she needs to go home to child./ED physician notified

## 2025-03-01 ENCOUNTER — TRANSCRIPTION ENCOUNTER (OUTPATIENT)
Age: 25
End: 2025-03-01

## 2025-03-01 RX ORDER — GLUCAGON 3 MG/1
3 POWDER NASAL
Refills: 0 | DISCHARGE

## 2025-03-01 RX ORDER — DEXTROAMPHETAMINE SACCHARATE, AMPHETAMINE ASPARTATE, DEXTROAMPHETAMINE SULFATE AND AMPHETAMINE SULFATE 1.875; 1.875; 1.875; 1.875 MG/1; MG/1; MG/1; MG/1
1 TABLET ORAL
Refills: 0 | DISCHARGE

## 2025-03-01 RX ORDER — ACETAMINOPHEN, DIPHENHYDRAMINE HCL, PHENYLEPHRINE HCL 325; 25; 5 MG/1; MG/1; MG/1
1 TABLET ORAL
Refills: 0 | DISCHARGE

## 2025-03-01 RX ORDER — INSULIN ASPART 100 [IU]/ML
0 INJECTION, SOLUTION INTRAVENOUS; SUBCUTANEOUS
Refills: 0 | DISCHARGE

## 2025-03-01 RX ORDER — MECOBAL/LEVOMEFOLAT CA/B6 PHOS 2-3-35 MG
1 TABLET ORAL
Refills: 0 | DISCHARGE

## 2025-03-01 RX ORDER — LIPASE/PROTEASE/AMYLASE 4K-25K-20K
8 CAPSULE,DELAYED RELEASE (ENTERIC COATED) ORAL
Refills: 0 | DISCHARGE

## 2025-03-03 ENCOUNTER — NON-APPOINTMENT (OUTPATIENT)
Age: 25
End: 2025-03-03

## 2025-03-10 ENCOUNTER — APPOINTMENT (OUTPATIENT)
Dept: ENDOCRINOLOGY | Facility: CLINIC | Age: 25
End: 2025-03-10